# Patient Record
Sex: MALE | Race: WHITE | Employment: OTHER | URBAN - METROPOLITAN AREA
[De-identification: names, ages, dates, MRNs, and addresses within clinical notes are randomized per-mention and may not be internally consistent; named-entity substitution may affect disease eponyms.]

---

## 2021-12-17 ENCOUNTER — HOSPITAL ENCOUNTER (EMERGENCY)
Age: 71
Discharge: HOME | End: 2021-12-17
Attending: EMERGENCY MEDICINE
Payer: COMMERCIAL

## 2021-12-17 ENCOUNTER — APPOINTMENT (OUTPATIENT)
Dept: GENERAL RADIOLOGY | Age: 71
End: 2021-12-17
Payer: COMMERCIAL

## 2021-12-17 VITALS
BODY MASS INDEX: 25.76 KG/M2 | OXYGEN SATURATION: 99 % | WEIGHT: 170 LBS | HEART RATE: 46 BPM | DIASTOLIC BLOOD PRESSURE: 50 MMHG | TEMPERATURE: 98.6 F | SYSTOLIC BLOOD PRESSURE: 138 MMHG | HEIGHT: 68 IN | RESPIRATION RATE: 16 BRPM

## 2021-12-17 DIAGNOSIS — M25.452 HIP SWELLING, LEFT: Primary | ICD-10-CM

## 2021-12-17 LAB
ABSOLUTE EOS #: <0.03 K/UL (ref 0–0.44)
ABSOLUTE IMMATURE GRANULOCYTE: 0.13 K/UL (ref 0–0.3)
ABSOLUTE LYMPH #: 1.38 K/UL (ref 1.1–3.7)
ABSOLUTE MONO #: 0.73 K/UL (ref 0.1–1.2)
ALBUMIN SERPL-MCNC: 3.5 G/DL (ref 3.5–5.2)
ALBUMIN/GLOBULIN RATIO: ABNORMAL (ref 1–2.5)
ALP BLD-CCNC: 110 U/L (ref 40–129)
ALT SERPL-CCNC: 14 U/L (ref 5–41)
ANION GAP SERPL CALCULATED.3IONS-SCNC: 16 MMOL/L (ref 9–17)
AST SERPL-CCNC: 19 U/L
BASOPHILS # BLD: 0 % (ref 0–2)
BASOPHILS ABSOLUTE: 0.06 K/UL (ref 0–0.2)
BILIRUB SERPL-MCNC: 0.44 MG/DL (ref 0.3–1.2)
BUN BLDV-MCNC: 21 MG/DL (ref 8–23)
BUN/CREAT BLD: 11 (ref 9–20)
C-REACTIVE PROTEIN: 4 MG/L (ref 0–5)
CALCIUM SERPL-MCNC: 8.8 MG/DL (ref 8.6–10.4)
CHLORIDE BLD-SCNC: 103 MMOL/L (ref 98–107)
CO2: 18 MMOL/L (ref 20–31)
CREAT SERPL-MCNC: 1.87 MG/DL (ref 0.7–1.2)
DIFFERENTIAL TYPE: ABNORMAL
DIRECT EXAM: ABNORMAL
DIRECT EXAM: ABNORMAL
EOSINOPHILS RELATIVE PERCENT: 0 % (ref 1–4)
GFR AFRICAN AMERICAN: 43 ML/MIN
GFR NON-AFRICAN AMERICAN: 36 ML/MIN
GFR SERPL CREATININE-BSD FRML MDRD: ABNORMAL ML/MIN/{1.73_M2}
GFR SERPL CREATININE-BSD FRML MDRD: ABNORMAL ML/MIN/{1.73_M2}
GLUCOSE BLD-MCNC: 101 MG/DL (ref 70–99)
HCT VFR BLD CALC: 33 % (ref 40.7–50.3)
HEMOGLOBIN: 10.1 G/DL (ref 13–17)
IMMATURE GRANULOCYTES: 1 %
INR BLD: 1.1
LYMPHOCYTES # BLD: 10 % (ref 24–43)
Lab: ABNORMAL
MCH RBC QN AUTO: 27.9 PG (ref 25.2–33.5)
MCHC RBC AUTO-ENTMCNC: 30.6 G/DL (ref 28.4–34.8)
MCV RBC AUTO: 91.2 FL (ref 82.6–102.9)
MONOCYTES # BLD: 5 % (ref 3–12)
NRBC AUTOMATED: 0 PER 100 WBC
PARTIAL THROMBOPLASTIN TIME: 27.5 SEC (ref 23.9–33.8)
PDW BLD-RTO: 14.9 % (ref 11.8–14.4)
PLATELET # BLD: 279 K/UL (ref 138–453)
PLATELET ESTIMATE: ABNORMAL
PMV BLD AUTO: 10.7 FL (ref 8.1–13.5)
POTASSIUM SERPL-SCNC: 5.1 MMOL/L (ref 3.7–5.3)
PROTHROMBIN TIME: 14.1 SEC (ref 11.5–14.2)
RBC # BLD: 3.62 M/UL (ref 4.21–5.77)
RBC # BLD: ABNORMAL 10*6/UL
SEDIMENTATION RATE, ERYTHROCYTE: 16 MM (ref 0–20)
SEG NEUTROPHILS: 84 % (ref 36–65)
SEGMENTED NEUTROPHILS ABSOLUTE COUNT: 11.71 K/UL (ref 1.5–8.1)
SODIUM BLD-SCNC: 137 MMOL/L (ref 135–144)
SPECIMEN DESCRIPTION: ABNORMAL
TOTAL PROTEIN: 6.2 G/DL (ref 6.4–8.3)
WBC # BLD: 14 K/UL (ref 3.5–11.3)
WBC # BLD: ABNORMAL 10*3/UL

## 2021-12-17 PROCEDURE — 2580000003 HC RX 258: Performed by: PHYSICIAN ASSISTANT

## 2021-12-17 PROCEDURE — 85610 PROTHROMBIN TIME: CPT

## 2021-12-17 PROCEDURE — 80053 COMPREHEN METABOLIC PANEL: CPT

## 2021-12-17 PROCEDURE — 99283 EMERGENCY DEPT VISIT LOW MDM: CPT

## 2021-12-17 PROCEDURE — 85730 THROMBOPLASTIN TIME PARTIAL: CPT

## 2021-12-17 PROCEDURE — 85652 RBC SED RATE AUTOMATED: CPT

## 2021-12-17 PROCEDURE — 85025 COMPLETE CBC W/AUTO DIFF WBC: CPT

## 2021-12-17 PROCEDURE — 6370000000 HC RX 637 (ALT 250 FOR IP): Performed by: PHYSICIAN ASSISTANT

## 2021-12-17 PROCEDURE — 89051 BODY FLUID CELL COUNT: CPT

## 2021-12-17 PROCEDURE — 73502 X-RAY EXAM HIP UNI 2-3 VIEWS: CPT

## 2021-12-17 PROCEDURE — 96375 TX/PRO/DX INJ NEW DRUG ADDON: CPT

## 2021-12-17 PROCEDURE — 71045 X-RAY EXAM CHEST 1 VIEW: CPT

## 2021-12-17 PROCEDURE — 86140 C-REACTIVE PROTEIN: CPT

## 2021-12-17 PROCEDURE — 87205 SMEAR GRAM STAIN: CPT

## 2021-12-17 PROCEDURE — 96365 THER/PROPH/DIAG IV INF INIT: CPT

## 2021-12-17 PROCEDURE — 96366 THER/PROPH/DIAG IV INF ADDON: CPT

## 2021-12-17 PROCEDURE — 6360000002 HC RX W HCPCS: Performed by: PHYSICIAN ASSISTANT

## 2021-12-17 PROCEDURE — 87040 BLOOD CULTURE FOR BACTERIA: CPT

## 2021-12-17 PROCEDURE — 36415 COLL VENOUS BLD VENIPUNCTURE: CPT

## 2021-12-17 RX ORDER — ACETAMINOPHEN 500 MG
1000 TABLET ORAL ONCE
Status: COMPLETED | OUTPATIENT
Start: 2021-12-17 | End: 2021-12-17

## 2021-12-17 RX ORDER — ONDANSETRON 2 MG/ML
4 INJECTION INTRAMUSCULAR; INTRAVENOUS ONCE
Status: COMPLETED | OUTPATIENT
Start: 2021-12-17 | End: 2021-12-17

## 2021-12-17 RX ORDER — PYRIDOSTIGMINE BROMIDE 60 MG/1
60 TABLET ORAL ONCE
Status: COMPLETED | OUTPATIENT
Start: 2021-12-17 | End: 2021-12-17

## 2021-12-17 RX ORDER — SODIUM CHLORIDE 9 MG/ML
INJECTION, SOLUTION INTRAVENOUS CONTINUOUS
Status: DISCONTINUED | OUTPATIENT
Start: 2021-12-17 | End: 2021-12-18 | Stop reason: HOSPADM

## 2021-12-17 RX ADMIN — SODIUM CHLORIDE: 9 INJECTION, SOLUTION INTRAVENOUS at 19:14

## 2021-12-17 RX ADMIN — VANCOMYCIN HYDROCHLORIDE 2000 MG: 1 INJECTION, POWDER, LYOPHILIZED, FOR SOLUTION INTRAVENOUS at 20:31

## 2021-12-17 RX ADMIN — ACETAMINOPHEN 1000 MG: 500 TABLET ORAL at 19:12

## 2021-12-17 RX ADMIN — ONDANSETRON 4 MG: 2 INJECTION INTRAMUSCULAR; INTRAVENOUS at 19:13

## 2021-12-17 RX ADMIN — PYRIDOSTIGMINE BROMIDE 60 MG: 60 TABLET ORAL at 19:56

## 2021-12-17 ASSESSMENT — PAIN SCALES - GENERAL: PAINLEVEL_OUTOF10: 0

## 2021-12-18 NOTE — ED PROVIDER NOTES
[address] [place] [address] ED  [facility]      Pt Name: Laurita Shahid  MRN: [id number]  [place] 1950  Date ofevaluation: 12/17/2021  Provider: Shelby Weaver Dr       Chief Complaint   Patient presents with    Dizziness     nausea, appears yellow in color    Hip Pain     left hip and leg after physical therapy today, left hip replaced 10/25/21         HISTORY OF PRESENT ILLNESS  (Location/Symptom, Timing/Onset, Context/Setting, Quality, Duration, Modifying Factors, Severity.)   Laurita Shahid is a 80 y.o. male who presents to the emergency department with weakness fatigue and left hip pain. Patient had a hip replacement on October 25. Patient reports some swelling and pain this morning. Was seen by orthopedic doctor who ultimately directed patient to the ER. Dr. Tray Choi is his ortho doctor. He called to be notified once patient arrives late, evaluate the patient. He plans to perform arthrocentesis on the hip. Nursing Notes were reviewed. ALLERGIES     Pcn [penicillins] and Sulfa antibiotics    CURRENT MEDICATIONS       Previous Medications    AMLODIPINE (NORVASC) 5 MG TABLET    Take 1 tablet by mouth daily for 14 days. DIVALPROEX (DEPAKOTE) 500 MG DR TABLET    Take 1,000 mg by mouth 2 times daily. ERGOCALCIFEROL (DRISDOL) 25269 UNITS CAPSULE    Take 1 capsule by mouth once a week. LEVETIRACETAM (KEPPRA) 500 MG TABLET    Take 1 tablet by mouth 2 times daily    METFORMIN (GLUCOPHAGE) 500 MG TABLET    Take 500 mg by mouth daily (with breakfast). METHOTREXATE (RHEUMATREX) 2.5 MG CHEMO TABLET    Take 2.5 mg by mouth twice a week. NAPROXEN (NAPROSYN) 500 MG TABLET    Take 500 mg by mouth 2 times daily (with meals). PREDNISONE (DELTASONE) 20 MG TABLET    Take 3 tablets by mouth daily    PYRIDOSTIGMINE (MESTINON) 60 MG TABLET    Take 60 mg by mouth 5 times daily    THERAPEUTIC MULTIVITAMIN-MINERALS (THERAGRAN-M) TABLET    Take 1 tablet by mouth daily. PAST MEDICAL HISTORY         Diagnosis Date    Arthritis     Chronic kidney disease     Dysmetabolic syndrome     Hypertension     Iron deficiency anemia, unspecified     Movement disorder     Myasthenia gravis (HCC)     Proteinuria     Seizures ([facility] [address]        SURGICAL HISTORY           Procedure Laterality Date    KNEE SURGERY      QUADRACEPS TENDON REPAIR           FAMILY HISTORY     No family history on file. No family status information on file. SOCIAL HISTORY      reports that he has been smoking cigarettes. He has been smoking about 1.00 pack per day. He does not have any smokeless tobacco history on file. He reports that he does not drink alcohol and does not use drugs. REVIEW OFSYSTEMS    (2-9 systems for level 4, 10 or more for level 5)   Review of Systems    Except as noted above the remainder of the review of systems was reviewed and negative. PHYSICAL EXAM    (up to 7 for level 4, 8 or more for level 5)     ED Triage Vitals   BP Temp Temp Source Pulse Resp SpO2 Height Weight   12/17/21 1746 12/17/21 1746 12/17/21 1746 12/17/21 1746 12/17/21 1746 12/17/21 1746 12/17/21 1748 12/17/21 1748   (!) 138/50 98.6 °F (37 °C) Oral (!) 46 16 99 % 5' 8\" (1.727 m) 170 lb (77.1 kg)      Physical Exam  Constitutional:       Appearance: He is well-developed. HENT:      Head: Normocephalic and atraumatic. Cardiovascular:      Rate and Rhythm: Normal rate and regular rhythm. Pulmonary:      Effort: Pulmonary effort is normal.      Breath sounds: Normal breath sounds. Abdominal:      Palpations: Abdomen is soft. Musculoskeletal:         General: Normal range of motion. Cervical back: Normal range of motion and neck supple. Legs:    Skin:     General: Skin is warm. Neurological:      Mental Status: He is alert and oriented to person, place, and time.    Psychiatric:         Behavior: Behavior normal.                 DIAGNOSTIC RESULTS     EKG: All EKG's are interpreted by the Emergency Department Physician who either signs or Co-signs this chart in the absence of a cardiologist.        RADIOLOGY:   Non-plain film images such as CT, Ultrasound and MRI are read by the radiologist. Plain radiographic images arevisualized and preliminarily interpreted by the emergency physician with the below findings:        Interpretation per the Radiologist below, if available at thetime of this note:          ED BEDSIDE ULTRASOUND:   Performed by ED Physician - none    LABS:  Labs Reviewed   GRAM STAIN - Abnormal; Notable for the following components:       Result Value    Direct Exam MODERATE NEUTROPHILS (*)     All other components within normal limits   CBC WITH AUTO DIFFERENTIAL - Abnormal; Notable for the following components:    WBC 14.0 (*)     RBC 3.62 (*)     Hemoglobin 10.1 (*)     Hematocrit 33.0 (*)     RDW 14.9 (*)     Seg Neutrophils 84 (*)     Lymphocytes 10 (*)     Eosinophils % 0 (*)     Immature Granulocytes 1 (*)     Segs Absolute 11.71 (*)     All other components within normal limits   COMPREHENSIVE METABOLIC PANEL - Abnormal; Notable for the following components:    Glucose 101 (*)     CREATININE 1.87 (*)     CO2 18 (*)     Total Protein 6.2 (*)     GFR Non- 36 (*)     GFR  43 (*)     All other components within normal limits   CULTURE, BLOOD 1   CULTURE, BLOOD 1   SEDIMENTATION RATE   PROTIME-INR   APTT   C-REACTIVE PROTEIN   BODY FLUID CELL COUNT WITH DIFFERENTIAL       All other labs were within normal range or not returned as of this dictation. EMERGENCY DEPARTMENT COURSE and DIFFERENTIAL DIAGNOSIS/MDM:   Vitals:    Vitals:    12/17/21 1746 12/17/21 1748   BP: (!) 138/50    Pulse: (!) 46    Resp: 16    Temp: 98.6 °F (37 °C)    TempSrc: Oral    SpO2: 99%    Weight:  170 lb (77.1 kg)   Height:  5' 8\" (1.727 m)     Patient seen by orthopedic doctor in the emergency department. He feels indecisive patient go home. Patient wishes to go home. Denies any fevers. Daughter reports history of leukocytosis in the past. States that that and renal insufficiency is not new. Patient has doxycycline running from the pharmacy. Vancomycin was given here in ER. Will discharge home. They are requesting to be discharged home. Non septic and non toxic appearing. CONSULTS:  IP CONSULT TO ORTHOPEDIC SURGERY  PHARMACY TO DOSE VANCOMYCIN    PROCEDURES:  Procedures        FINAL IMPRESSION      1. Hip swelling, left          DISPOSITION/PLAN   DISPOSITION Discharge - Pending Orders Complete 12/17/2021 10:00:08 PM      PATIENTREFERRED TO:   No follow-up provider specified.     DISCHARGE MEDICATIONS:     New Prescriptions    No medications on file           (Please note that portions of this note were completed with a voice recognition program.  Efforts were made to edit thedictations but occasionally words are mis-transcribed.)    MARIO ALBERTO Nguyen PA-C  12/17/21 3912

## 2021-12-18 NOTE — ED PROVIDER NOTES
eMERGENCY dEPARTMENT eNCOUnter   Independent Attestation     Pt Name: Benjy Hunter  MRN: [id number]  [place] 1950  Date of evaluation: 12/17/21     Benjy Hunter is a 80 y.o. male with CC: Dizziness (nausea, appears yellow in color) and Hip Pain (left hip and leg after physical therapy today, left hip replaced 10/25/21)      Based on the medical record the care appears appropriate. I was personally available for consultation in the Emergency Department. The care is provided during an unprecedented national emergency due to the novel coronavirus, COVID 19.     Britton Sauceda DO  Attending Emergency Physician                    Emily Webb DO  12/17/21 2205
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
## 2021-12-18 NOTE — CONSULTS
Pharmacy dosing of initial vancomycin ordered by ED provider. 2000 mg ordered x 1. Pharmacy is waiting to see if vancomycin therapy is continued or stopped/changed by the admitting physician.

## 2021-12-21 LAB
APPEARANCE FLUID: NORMAL
BASO FLUID: NORMAL %
COLOR FLUID: NORMAL
EOSINOPHIL FLUID: NORMAL %
FLUID DIFF COMMENT: NORMAL
LYMPHOCYTES, BODY FLUID: 15 %
MONOCYTE, FLUID: NORMAL %
NEUTROPHIL, FLUID: 78 %
OTHER CELLS FLUID: NORMAL %
RBC FLUID: NORMAL /MM3
SPECIMEN TYPE: NORMAL
WBC FLUID: 3279 /MM3

## 2021-12-22 LAB
CULTURE: NORMAL
CULTURE: NORMAL
Lab: NORMAL
Lab: NORMAL
SPECIMEN DESCRIPTION: NORMAL
SPECIMEN DESCRIPTION: NORMAL

## 2022-05-23 ENCOUNTER — APPOINTMENT (OUTPATIENT)
Dept: CT IMAGING | Age: 72
DRG: 563 | End: 2022-05-23
Payer: COMMERCIAL

## 2022-05-23 ENCOUNTER — APPOINTMENT (OUTPATIENT)
Dept: GENERAL RADIOLOGY | Age: 72
DRG: 563 | End: 2022-05-23
Payer: COMMERCIAL

## 2022-05-23 ENCOUNTER — HOSPITAL ENCOUNTER (INPATIENT)
Age: 72
LOS: 1 days | Discharge: HOME | DRG: 563 | End: 2022-05-24
Attending: EMERGENCY MEDICINE | Admitting: SURGERY
Payer: COMMERCIAL
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
## 2022-05-23 DIAGNOSIS — S42.201A CLOSED FRACTURE OF PROXIMAL END OF RIGHT HUMERUS, UNSPECIFIED FRACTURE MORPHOLOGY, INITIAL ENCOUNTER: Primary | ICD-10-CM

## 2022-05-23 LAB
ALLEN TEST: ABNORMAL
ANION GAP SERPL CALCULATED.3IONS-SCNC: 15 MMOL/L (ref 9–17)
BLOOD BANK SPECIMEN: ABNORMAL
BUN BLDV-MCNC: 20 MG/DL (ref 8–23)
CARBOXYHEMOGLOBIN: ABNORMAL %
CHLORIDE BLD-SCNC: 106 MMOL/L (ref 98–107)
CO2: 19 MMOL/L (ref 20–31)
CREAT SERPL-MCNC: 1.5 MG/DL (ref 0.7–1.2)
ETHANOL PERCENT: <0.01 %
ETHANOL: <10 MG/DL
FIO2: ABNORMAL
GFR AFRICAN AMERICAN: 56 ML/MIN
GFR NON-AFRICAN AMERICAN: 46 ML/MIN
GFR SERPL CREATININE-BSD FRML MDRD: ABNORMAL ML/MIN/{1.73_M2}
GLUCOSE BLD-MCNC: 111 MG/DL (ref 70–99)
HCG QUALITATIVE: ABNORMAL
HCO3 VENOUS: ABNORMAL MMOL/L (ref 24–30)
HCT VFR BLD CALC: 39.5 % (ref 40.7–50.3)
HEMOGLOBIN: 12.4 G/DL (ref 13–17)
INR BLD: 1
MCH RBC QN AUTO: 27 PG (ref 25.2–33.5)
MCHC RBC AUTO-ENTMCNC: 31.4 G/DL (ref 28.4–34.8)
MCV RBC AUTO: 85.9 FL (ref 82.6–102.9)
METHEMOGLOBIN: ABNORMAL %
MODE: ABNORMAL
NEGATIVE BASE EXCESS, VEN: ABNORMAL MMOL/L (ref 0–2)
NOTIFICATION TIME: ABNORMAL
NOTIFICATION: ABNORMAL
NRBC AUTOMATED: 0 PER 100 WBC
O2 DEVICE/FLOW/%: ABNORMAL
O2 SAT, VEN: ABNORMAL %
OXYHEMOGLOBIN: ABNORMAL % (ref 95–98)
PARTIAL THROMBOPLASTIN TIME: 24.3 SEC (ref 20.5–30.5)
PATIENT TEMP: ABNORMAL
PCO2, VEN, TEMP ADJ: ABNORMAL MMHG (ref 39–55)
PCO2, VEN: ABNORMAL (ref 39–55)
PDW BLD-RTO: 17.1 % (ref 11.8–14.4)
PEEP/CPAP: ABNORMAL
PH VENOUS: ABNORMAL (ref 7.32–7.42)
PH, VEN, TEMP ADJ: ABNORMAL (ref 7.32–7.42)
PLATELET # BLD: 241 K/UL (ref 138–453)
PMV BLD AUTO: 11.7 FL (ref 8.1–13.5)
PO2, VEN, TEMP ADJ: ABNORMAL MMHG (ref 30–50)
PO2, VEN: ABNORMAL (ref 30–50)
POSITIVE BASE EXCESS, VEN: ABNORMAL MMOL/L (ref 0–2)
POTASSIUM SERPL-SCNC: 3.8 MMOL/L (ref 3.7–5.3)
PROTHROMBIN TIME: 10.6 SEC (ref 9.1–12.3)
PSV: ABNORMAL
PT. POSITION: ABNORMAL
RBC # BLD: 4.6 M/UL (ref 4.21–5.77)
RESPIRATORY RATE: ABNORMAL
SAMPLE SITE: ABNORMAL
SET RATE: ABNORMAL
SODIUM BLD-SCNC: 140 MMOL/L (ref 135–144)
TEXT FOR RESPIRATORY: ABNORMAL
TOTAL HB: ABNORMAL G/DL (ref 12–16)
TOTAL RATE: ABNORMAL
VITAMIN D 25-HYDROXY: 28.6 NG/ML
VT: ABNORMAL
WBC # BLD: 7.5 K/UL (ref 3.5–11.3)

## 2022-05-23 PROCEDURE — 82805 BLOOD GASES W/O2 SATURATION: CPT

## 2022-05-23 PROCEDURE — 90715 TDAP VACCINE 7 YRS/> IM: CPT | Performed by: STUDENT IN AN ORGANIZED HEALTH CARE EDUCATION/TRAINING PROGRAM

## 2022-05-23 PROCEDURE — 6360000002 HC RX W HCPCS: Performed by: STUDENT IN AN ORGANIZED HEALTH CARE EDUCATION/TRAINING PROGRAM

## 2022-05-23 PROCEDURE — 82565 ASSAY OF CREATININE: CPT

## 2022-05-23 PROCEDURE — 80051 ELECTROLYTE PANEL: CPT

## 2022-05-23 PROCEDURE — 73502 X-RAY EXAM HIP UNI 2-3 VIEWS: CPT

## 2022-05-23 PROCEDURE — 84703 CHORIONIC GONADOTROPIN ASSAY: CPT

## 2022-05-23 PROCEDURE — 82306 VITAMIN D 25 HYDROXY: CPT

## 2022-05-23 PROCEDURE — 73030 X-RAY EXAM OF SHOULDER: CPT

## 2022-05-23 PROCEDURE — 71250 CT THORAX DX C-: CPT

## 2022-05-23 PROCEDURE — 93005 ELECTROCARDIOGRAM TRACING: CPT | Performed by: STUDENT IN AN ORGANIZED HEALTH CARE EDUCATION/TRAINING PROGRAM

## 2022-05-23 PROCEDURE — 73020 X-RAY EXAM OF SHOULDER: CPT

## 2022-05-23 PROCEDURE — 6370000000 HC RX 637 (ALT 250 FOR IP): Performed by: EMERGENCY MEDICINE

## 2022-05-23 PROCEDURE — 72125 CT NECK SPINE W/O DYE: CPT

## 2022-05-23 PROCEDURE — 73060 X-RAY EXAM OF HUMERUS: CPT

## 2022-05-23 PROCEDURE — 90471 IMMUNIZATION ADMIN: CPT | Performed by: STUDENT IN AN ORGANIZED HEALTH CARE EDUCATION/TRAINING PROGRAM

## 2022-05-23 PROCEDURE — 84520 ASSAY OF UREA NITROGEN: CPT

## 2022-05-23 PROCEDURE — 85610 PROTHROMBIN TIME: CPT

## 2022-05-23 PROCEDURE — 82947 ASSAY GLUCOSE BLOOD QUANT: CPT

## 2022-05-23 PROCEDURE — 6370000000 HC RX 637 (ALT 250 FOR IP): Performed by: STUDENT IN AN ORGANIZED HEALTH CARE EDUCATION/TRAINING PROGRAM

## 2022-05-23 PROCEDURE — 99285 EMERGENCY DEPT VISIT HI MDM: CPT

## 2022-05-23 PROCEDURE — 85027 COMPLETE CBC AUTOMATED: CPT

## 2022-05-23 PROCEDURE — 1200000000 HC SEMI PRIVATE

## 2022-05-23 PROCEDURE — 85730 THROMBOPLASTIN TIME PARTIAL: CPT

## 2022-05-23 PROCEDURE — G0480 DRUG TEST DEF 1-7 CLASSES: HCPCS

## 2022-05-23 PROCEDURE — 70450 CT HEAD/BRAIN W/O DYE: CPT

## 2022-05-23 PROCEDURE — 96374 THER/PROPH/DIAG INJ IV PUSH: CPT

## 2022-05-23 PROCEDURE — 3209999900 CT LUMBAR SPINE TRAUMA RECONSTRUCTION

## 2022-05-23 PROCEDURE — 3209999900 CT THORACIC SPINE TRAUMA RECONSTRUCTION

## 2022-05-23 PROCEDURE — 73200 CT UPPER EXTREMITY W/O DYE: CPT

## 2022-05-23 PROCEDURE — 73080 X-RAY EXAM OF ELBOW: CPT

## 2022-05-23 PROCEDURE — 96376 TX/PRO/DX INJ SAME DRUG ADON: CPT

## 2022-05-23 PROCEDURE — 96375 TX/PRO/DX INJ NEW DRUG ADDON: CPT

## 2022-05-23 PROCEDURE — 96372 THER/PROPH/DIAG INJ SC/IM: CPT

## 2022-05-23 RX ORDER — ASPIRIN 81 MG/1
81 TABLET, CHEWABLE ORAL DAILY
COMMUNITY

## 2022-05-23 RX ORDER — FENTANYL CITRATE 50 UG/ML
50 INJECTION, SOLUTION INTRAMUSCULAR; INTRAVENOUS ONCE
Status: COMPLETED | OUTPATIENT
Start: 2022-05-23 | End: 2022-05-23

## 2022-05-23 RX ORDER — FENTANYL CITRATE 50 UG/ML
100 INJECTION, SOLUTION INTRAMUSCULAR; INTRAVENOUS ONCE
Status: COMPLETED | OUTPATIENT
Start: 2022-05-23 | End: 2022-05-23

## 2022-05-23 RX ORDER — OXYCODONE HYDROCHLORIDE 5 MG/1
5 TABLET ORAL EVERY 4 HOURS PRN
Status: DISCONTINUED | OUTPATIENT
Start: 2022-05-23 | End: 2022-05-24

## 2022-05-23 RX ORDER — PANTOPRAZOLE SODIUM 40 MG/1
40 TABLET, DELAYED RELEASE ORAL DAILY
COMMUNITY

## 2022-05-23 RX ORDER — ACETAMINOPHEN 500 MG
1000 TABLET ORAL EVERY 8 HOURS SCHEDULED
Status: DISCONTINUED | OUTPATIENT
Start: 2022-05-23 | End: 2022-05-24 | Stop reason: HOSPADM

## 2022-05-23 RX ORDER — PYRIDOSTIGMINE BROMIDE 60 MG/1
60 TABLET ORAL ONCE
Status: COMPLETED | OUTPATIENT
Start: 2022-05-23 | End: 2022-05-23

## 2022-05-23 RX ORDER — ONDANSETRON 2 MG/ML
4 INJECTION INTRAMUSCULAR; INTRAVENOUS ONCE
Status: COMPLETED | OUTPATIENT
Start: 2022-05-23 | End: 2022-05-23

## 2022-05-23 RX ORDER — METOPROLOL SUCCINATE 50 MG/1
50 TABLET, EXTENDED RELEASE ORAL DAILY
COMMUNITY

## 2022-05-23 RX ADMIN — FENTANYL CITRATE 25 MCG: 50 INJECTION, SOLUTION INTRAMUSCULAR; INTRAVENOUS at 16:34

## 2022-05-23 RX ADMIN — FENTANYL CITRATE 50 MCG: 50 INJECTION, SOLUTION INTRAMUSCULAR; INTRAVENOUS at 17:06

## 2022-05-23 RX ADMIN — ACETAMINOPHEN 1000 MG: 500 TABLET ORAL at 22:46

## 2022-05-23 RX ADMIN — FENTANYL CITRATE 50 MCG: 50 INJECTION, SOLUTION INTRAMUSCULAR; INTRAVENOUS at 20:13

## 2022-05-23 RX ADMIN — OXYCODONE 5 MG: 5 TABLET ORAL at 22:46

## 2022-05-23 RX ADMIN — FENTANYL CITRATE 100 MCG: 50 INJECTION, SOLUTION INTRAMUSCULAR; INTRAVENOUS at 18:24

## 2022-05-23 RX ADMIN — PYRIDOSTIGMINE BROMIDE 60 MG: 60 TABLET ORAL at 19:54

## 2022-05-23 RX ADMIN — ONDANSETRON 4 MG: 2 INJECTION INTRAMUSCULAR; INTRAVENOUS at 23:20

## 2022-05-23 RX ADMIN — TETANUS TOXOID, REDUCED DIPHTHERIA TOXOID AND ACELLULAR PERTUSSIS VACCINE, ADSORBED 0.5 ML: 5; 2.5; 8; 8; 2.5 SUSPENSION INTRAMUSCULAR at 23:20

## 2022-05-23 ASSESSMENT — PAIN DESCRIPTION - DESCRIPTORS
DESCRIPTORS: ACHING
DESCRIPTORS: SHARP

## 2022-05-23 ASSESSMENT — ENCOUNTER SYMPTOMS
BACK PAIN: 0
SHORTNESS OF BREATH: 0
FACIAL SWELLING: 1
VOMITING: 0
TROUBLE SWALLOWING: 0
RHINORRHEA: 0
NAUSEA: 0
COUGH: 0
DIARRHEA: 0
ABDOMINAL PAIN: 0
EYE REDNESS: 0
EYE PAIN: 0

## 2022-05-23 ASSESSMENT — PAIN SCALES - GENERAL: PAINLEVEL_OUTOF10: 10

## 2022-05-23 ASSESSMENT — PAIN DESCRIPTION - ORIENTATION
ORIENTATION: RIGHT
ORIENTATION: RIGHT

## 2022-05-23 ASSESSMENT — PAIN DESCRIPTION - LOCATION
LOCATION: ARM
LOCATION: SHOULDER

## 2022-05-23 NOTE — ED NOTES
SW met with patient at bedside. Daughter states that patient just got some pain pills so he is now a little out of it. SW presented [facility] information to daughter in case patient would want services from them after discharge. Daughter stated understanding.         Eduin Prather, CINDY Intern     Sofie Mosquera, CINDY  05/23/22 1833       CINDY Harman  05/23/22 2024
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
## 2022-05-24 ENCOUNTER — APPOINTMENT (OUTPATIENT)
Dept: GENERAL RADIOLOGY | Age: 72
DRG: 563 | End: 2022-05-24
Payer: COMMERCIAL

## 2022-05-24 VITALS
DIASTOLIC BLOOD PRESSURE: 76 MMHG | TEMPERATURE: 98.5 F | OXYGEN SATURATION: 98 % | BODY MASS INDEX: 26.12 KG/M2 | HEART RATE: 83 BPM | RESPIRATION RATE: 17 BRPM | SYSTOLIC BLOOD PRESSURE: 111 MMHG | WEIGHT: 176.37 LBS | HEIGHT: 69 IN

## 2022-05-24 LAB
ABSOLUTE EOS #: <0.03 K/UL (ref 0–0.44)
ABSOLUTE IMMATURE GRANULOCYTE: 0.06 K/UL (ref 0–0.3)
ABSOLUTE LYMPH #: 0.97 K/UL (ref 1.1–3.7)
ABSOLUTE MONO #: 1.12 K/UL (ref 0.1–1.2)
ANION GAP SERPL CALCULATED.3IONS-SCNC: 12 MMOL/L (ref 9–17)
BASOPHILS # BLD: 0 % (ref 0–2)
BASOPHILS ABSOLUTE: 0.03 K/UL (ref 0–0.2)
BUN BLDV-MCNC: 24 MG/DL (ref 8–23)
CALCIUM SERPL-MCNC: 8.7 MG/DL (ref 8.6–10.4)
CHLORIDE BLD-SCNC: 107 MMOL/L (ref 98–107)
CO2: 20 MMOL/L (ref 20–31)
CREAT SERPL-MCNC: 1.79 MG/DL (ref 0.7–1.2)
EKG ATRIAL RATE: 112 BPM
EKG P AXIS: 32 DEGREES
EKG P-R INTERVAL: 160 MS
EKG Q-T INTERVAL: 376 MS
EKG QRS DURATION: 120 MS
EKG QTC CALCULATION (BAZETT): 513 MS
EKG R AXIS: -20 DEGREES
EKG T AXIS: 40 DEGREES
EKG VENTRICULAR RATE: 112 BPM
EOSINOPHILS RELATIVE PERCENT: 0 % (ref 1–4)
GFR AFRICAN AMERICAN: 46 ML/MIN
GFR NON-AFRICAN AMERICAN: 38 ML/MIN
GFR SERPL CREATININE-BSD FRML MDRD: ABNORMAL ML/MIN/{1.73_M2}
GLUCOSE BLD-MCNC: 130 MG/DL (ref 70–99)
HCT VFR BLD CALC: 32.2 % (ref 40.7–50.3)
HEMOGLOBIN: 10.1 G/DL (ref 13–17)
IMMATURE GRANULOCYTES: 1 %
LYMPHOCYTES # BLD: 9 % (ref 24–43)
MCH RBC QN AUTO: 26.5 PG (ref 25.2–33.5)
MCHC RBC AUTO-ENTMCNC: 31.4 G/DL (ref 28.4–34.8)
MCV RBC AUTO: 84.5 FL (ref 82.6–102.9)
MONOCYTES # BLD: 11 % (ref 3–12)
NRBC AUTOMATED: 0 PER 100 WBC
PDW BLD-RTO: 17.2 % (ref 11.8–14.4)
PLATELET # BLD: 225 K/UL (ref 138–453)
PMV BLD AUTO: 11.1 FL (ref 8.1–13.5)
POTASSIUM SERPL-SCNC: 4.3 MMOL/L (ref 3.7–5.3)
RBC # BLD: 3.81 M/UL (ref 4.21–5.77)
RBC # BLD: ABNORMAL 10*6/UL
SEG NEUTROPHILS: 79 % (ref 36–65)
SEGMENTED NEUTROPHILS ABSOLUTE COUNT: 8.24 K/UL (ref 1.5–8.1)
SODIUM BLD-SCNC: 139 MMOL/L (ref 135–144)
WBC # BLD: 10.4 K/UL (ref 3.5–11.3)

## 2022-05-24 PROCEDURE — 80048 BASIC METABOLIC PNL TOTAL CA: CPT

## 2022-05-24 PROCEDURE — 99221 1ST HOSP IP/OBS SF/LOW 40: CPT | Performed by: PSYCHIATRY & NEUROLOGY

## 2022-05-24 PROCEDURE — 97530 THERAPEUTIC ACTIVITIES: CPT

## 2022-05-24 PROCEDURE — 6370000000 HC RX 637 (ALT 250 FOR IP): Performed by: STUDENT IN AN ORGANIZED HEALTH CARE EDUCATION/TRAINING PROGRAM

## 2022-05-24 PROCEDURE — 51798 US URINE CAPACITY MEASURE: CPT

## 2022-05-24 PROCEDURE — 97162 PT EVAL MOD COMPLEX 30 MIN: CPT

## 2022-05-24 PROCEDURE — 23600 CLTX PROX HUMRL FX W/O MNPJ: CPT | Performed by: ORTHOPAEDIC SURGERY

## 2022-05-24 PROCEDURE — 73502 X-RAY EXAM HIP UNI 2-3 VIEWS: CPT

## 2022-05-24 PROCEDURE — 36415 COLL VENOUS BLD VENIPUNCTURE: CPT

## 2022-05-24 PROCEDURE — 2580000003 HC RX 258: Performed by: STUDENT IN AN ORGANIZED HEALTH CARE EDUCATION/TRAINING PROGRAM

## 2022-05-24 PROCEDURE — 97166 OT EVAL MOD COMPLEX 45 MIN: CPT

## 2022-05-24 PROCEDURE — 97110 THERAPEUTIC EXERCISES: CPT

## 2022-05-24 PROCEDURE — 85025 COMPLETE CBC W/AUTO DIFF WBC: CPT

## 2022-05-24 PROCEDURE — G0378 HOSPITAL OBSERVATION PER HR: HCPCS

## 2022-05-24 PROCEDURE — 97116 GAIT TRAINING THERAPY: CPT

## 2022-05-24 RX ORDER — HEPARIN SODIUM 5000 [USP'U]/ML
5000 INJECTION, SOLUTION INTRAVENOUS; SUBCUTANEOUS EVERY 8 HOURS SCHEDULED
Status: DISCONTINUED | OUTPATIENT
Start: 2022-05-24 | End: 2022-05-24 | Stop reason: HOSPADM

## 2022-05-24 RX ORDER — ONDANSETRON 2 MG/ML
4 INJECTION INTRAMUSCULAR; INTRAVENOUS EVERY 6 HOURS PRN
Status: DISCONTINUED | OUTPATIENT
Start: 2022-05-24 | End: 2022-05-24

## 2022-05-24 RX ORDER — ACETAMINOPHEN 500 MG
1000 TABLET ORAL EVERY 8 HOURS SCHEDULED
Status: DISCONTINUED | OUTPATIENT
Start: 2022-05-24 | End: 2022-05-24

## 2022-05-24 RX ORDER — DIVALPROEX SODIUM 125 MG/1
125 TABLET, DELAYED RELEASE ORAL 2 TIMES DAILY
Status: DISCONTINUED | OUTPATIENT
Start: 2022-05-24 | End: 2022-05-24 | Stop reason: HOSPADM

## 2022-05-24 RX ORDER — ACETAMINOPHEN 500 MG
1000 TABLET ORAL EVERY 8 HOURS SCHEDULED
Qty: 120 TABLET | Refills: 3 | Status: SHIPPED | OUTPATIENT
Start: 2022-05-24

## 2022-05-24 RX ORDER — SODIUM CHLORIDE 9 MG/ML
INJECTION, SOLUTION INTRAVENOUS PRN
Status: DISCONTINUED | OUTPATIENT
Start: 2022-05-24 | End: 2022-05-24

## 2022-05-24 RX ORDER — ERGOCALCIFEROL 1.25 MG/1
50000 CAPSULE ORAL WEEKLY
Qty: 8 CAPSULE | Refills: 0 | Status: SHIPPED | OUTPATIENT
Start: 2022-05-24 | End: 2022-07-13

## 2022-05-24 RX ORDER — OXYCODONE HYDROCHLORIDE 5 MG/1
2.5 TABLET ORAL EVERY 8 HOURS PRN
Status: DISCONTINUED | OUTPATIENT
Start: 2022-05-24 | End: 2022-05-24 | Stop reason: HOSPADM

## 2022-05-24 RX ORDER — SODIUM CHLORIDE 0.9 % (FLUSH) 0.9 %
5-40 SYRINGE (ML) INJECTION EVERY 12 HOURS SCHEDULED
Status: DISCONTINUED | OUTPATIENT
Start: 2022-05-24 | End: 2022-05-24

## 2022-05-24 RX ORDER — POLYETHYLENE GLYCOL 3350 17 G/17G
17 POWDER, FOR SOLUTION ORAL DAILY
Qty: 527 G | Refills: 1 | Status: SHIPPED | OUTPATIENT
Start: 2022-05-24 | End: 2022-06-23

## 2022-05-24 RX ORDER — ONDANSETRON 4 MG/1
4 TABLET, ORALLY DISINTEGRATING ORAL EVERY 8 HOURS PRN
Status: DISCONTINUED | OUTPATIENT
Start: 2022-05-24 | End: 2022-05-24

## 2022-05-24 RX ORDER — METHOCARBAMOL 750 MG/1
750 TABLET, FILM COATED ORAL EVERY 6 HOURS
Qty: 40 TABLET | Refills: 0 | Status: SHIPPED | OUTPATIENT
Start: 2022-05-24 | End: 2022-06-03

## 2022-05-24 RX ORDER — OXYCODONE HYDROCHLORIDE 5 MG/1
5 TABLET ORAL EVERY 6 HOURS PRN
Status: DISCONTINUED | OUTPATIENT
Start: 2022-05-24 | End: 2022-05-24

## 2022-05-24 RX ORDER — ERGOCALCIFEROL 1.25 MG/1
50000 CAPSULE ORAL WEEKLY
Status: DISCONTINUED | OUTPATIENT
Start: 2022-05-24 | End: 2022-05-24 | Stop reason: HOSPADM

## 2022-05-24 RX ORDER — METHOCARBAMOL 750 MG/1
750 TABLET, FILM COATED ORAL EVERY 6 HOURS
Status: DISCONTINUED | OUTPATIENT
Start: 2022-05-24 | End: 2022-05-24 | Stop reason: HOSPADM

## 2022-05-24 RX ORDER — OXYCODONE HYDROCHLORIDE 5 MG/1
2.5 TABLET ORAL EVERY 12 HOURS PRN
Qty: 5 TABLET | Refills: 0 | Status: CANCELLED | OUTPATIENT
Start: 2022-05-24 | End: 2022-05-29

## 2022-05-24 RX ORDER — PANTOPRAZOLE SODIUM 40 MG/1
40 TABLET, DELAYED RELEASE ORAL DAILY
Status: DISCONTINUED | OUTPATIENT
Start: 2022-05-24 | End: 2022-05-24 | Stop reason: HOSPADM

## 2022-05-24 RX ORDER — METOPROLOL SUCCINATE 50 MG/1
50 TABLET, EXTENDED RELEASE ORAL DAILY
Status: DISCONTINUED | OUTPATIENT
Start: 2022-05-24 | End: 2022-05-24 | Stop reason: HOSPADM

## 2022-05-24 RX ORDER — ASPIRIN 81 MG/1
81 TABLET, CHEWABLE ORAL DAILY
Status: DISCONTINUED | OUTPATIENT
Start: 2022-05-24 | End: 2022-05-24 | Stop reason: HOSPADM

## 2022-05-24 RX ORDER — M-VIT,TX,IRON,MINS/CALC/FOLIC 27MG-0.4MG
1 TABLET ORAL DAILY
Status: DISCONTINUED | OUTPATIENT
Start: 2022-05-24 | End: 2022-05-24 | Stop reason: HOSPADM

## 2022-05-24 RX ORDER — METHOCARBAMOL 750 MG/1
750 TABLET, FILM COATED ORAL 4 TIMES DAILY
Qty: 40 TABLET | Refills: 0 | Status: CANCELLED | OUTPATIENT
Start: 2022-05-24 | End: 2022-06-03

## 2022-05-24 RX ORDER — OXYCODONE HYDROCHLORIDE 5 MG/1
2.5 TABLET ORAL EVERY 8 HOURS PRN
Qty: 5 TABLET | Refills: 0 | Status: SHIPPED | OUTPATIENT
Start: 2022-05-24 | End: 2022-05-29

## 2022-05-24 RX ORDER — SODIUM CHLORIDE 0.9 % (FLUSH) 0.9 %
5-40 SYRINGE (ML) INJECTION PRN
Status: DISCONTINUED | OUTPATIENT
Start: 2022-05-24 | End: 2022-05-24 | Stop reason: HOSPADM

## 2022-05-24 RX ORDER — PYRIDOSTIGMINE BROMIDE 60 MG/1
60 TABLET ORAL
Status: DISCONTINUED | OUTPATIENT
Start: 2022-05-24 | End: 2022-05-24 | Stop reason: HOSPADM

## 2022-05-24 RX ORDER — POLYETHYLENE GLYCOL 3350 17 G/17G
17 POWDER, FOR SOLUTION ORAL DAILY
Status: DISCONTINUED | OUTPATIENT
Start: 2022-05-24 | End: 2022-05-24 | Stop reason: HOSPADM

## 2022-05-24 RX ORDER — LEVETIRACETAM 500 MG/1
500 TABLET ORAL 2 TIMES DAILY
Status: DISCONTINUED | OUTPATIENT
Start: 2022-05-24 | End: 2022-05-24 | Stop reason: HOSPADM

## 2022-05-24 RX ADMIN — PYRIDOSTIGMINE BROMIDE 60 MG: 60 TABLET ORAL at 09:03

## 2022-05-24 RX ADMIN — ACETAMINOPHEN 1000 MG: 500 TABLET ORAL at 14:07

## 2022-05-24 RX ADMIN — PYRIDOSTIGMINE BROMIDE 60 MG: 60 TABLET ORAL at 14:07

## 2022-05-24 RX ADMIN — Medication 1 TABLET: at 09:03

## 2022-05-24 RX ADMIN — METOPROLOL SUCCINATE 50 MG: 50 TABLET, FILM COATED, EXTENDED RELEASE ORAL at 09:03

## 2022-05-24 RX ADMIN — ACETAMINOPHEN 1000 MG: 500 TABLET ORAL at 06:26

## 2022-05-24 RX ADMIN — PANTOPRAZOLE SODIUM 40 MG: 40 TABLET, DELAYED RELEASE ORAL at 09:03

## 2022-05-24 RX ADMIN — ASPIRIN 81 MG: 81 TABLET, CHEWABLE ORAL at 09:03

## 2022-05-24 RX ADMIN — OXYCODONE 5 MG: 5 TABLET ORAL at 02:52

## 2022-05-24 RX ADMIN — DIVALPROEX SODIUM 125 MG: 125 TABLET, DELAYED RELEASE ORAL at 09:03

## 2022-05-24 RX ADMIN — SODIUM CHLORIDE, PRESERVATIVE FREE 10 ML: 5 INJECTION INTRAVENOUS at 09:03

## 2022-05-24 RX ADMIN — LEVETIRACETAM 500 MG: 500 TABLET, FILM COATED ORAL at 09:03

## 2022-05-24 RX ADMIN — ERGOCALCIFEROL 50000 UNITS: 1.25 CAPSULE ORAL at 09:03

## 2022-05-24 ASSESSMENT — PAIN SCALES - GENERAL
PAINLEVEL_OUTOF10: 10
PAINLEVEL_OUTOF10: 7
PAINLEVEL_OUTOF10: 7
PAINLEVEL_OUTOF10: 8

## 2022-05-24 ASSESSMENT — PAIN DESCRIPTION - DESCRIPTORS: DESCRIPTORS: ACHING

## 2022-05-24 ASSESSMENT — PAIN DESCRIPTION - LOCATION: LOCATION: ARM

## 2022-05-24 ASSESSMENT — PAIN DESCRIPTION - PAIN TYPE: TYPE: ACUTE PAIN

## 2022-05-24 ASSESSMENT — PAIN DESCRIPTION - ONSET: ONSET: ON-GOING

## 2022-05-24 ASSESSMENT — PAIN DESCRIPTION - ORIENTATION: ORIENTATION: RIGHT

## 2022-05-24 ASSESSMENT — PAIN - FUNCTIONAL ASSESSMENT: PAIN_FUNCTIONAL_ASSESSMENT: PREVENTS OR INTERFERES SOME ACTIVE ACTIVITIES AND ADLS

## 2022-05-24 ASSESSMENT — PAIN DESCRIPTION - FREQUENCY: FREQUENCY: CONTINUOUS
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
## 2022-05-24 NOTE — PROGRESS NOTES
CLINICAL PHARMACY NOTE: MEDS TO BEDS    Total # of Prescriptions Filled: 5   The following medications were delivered to the patient:  · Methocarbamol 750mg  · Oxycodone 5mg  · Acetaminophen 500mg  · Vitamin D [id number]  · Glycolax    Additional Documentation: delivered to patient in room 2004 5/24 at 5:21pm. Co-pay paid with credit on [employer] ($58.94).

## 2022-05-24 NOTE — PROGRESS NOTES
Orthopedic Progress Note    Patient:  Galina Horta  YOB: 1950     80 y.o. male    Subjective:  Patient seen and examined  No complaints or concerns  No issue overnight  Pain controlled  Denies fever, HA, CP, SOB, N/V, dysuria    Vitals reviewed    Objective:   Vitals:    05/24/22 0413   BP:    Pulse: 86   Resp:    Temp:    SpO2:        Gen: NAD, cooperative     Cardiovascular: Regular rate    Respiratory: Chest symmetric, no accessory muscle use, normal respirations, no audible wheezes    MSK: RUE: Sling on. Abrasions to the olecranon and posterior forearm. Significant swelling to the anterior shoulder. Significant tenderness to palpation globally about the shoulder. compartments soft and easily compressible. Unable to tolerate active range of motion of the shoulder due to pain. Tolerates minimal passive range of motion at the shoulder due to pain. Full passive ROM at elbow without pain in the elbow. Most pain localized to shoulder with any ROM in RUE. Ulnar/median/AIN/PIN/radial motor intact. Axillary/MCN/median/ulnar/radial nerves SILT. Radial pulse 2+ with BCR. Recent Labs     05/23/22  1646   WBC 7.5   HGB 12.4*   HCT 39.5*      INR 1.0      K 3.8   BUN 20   CREATININE 1.50*   GLUCOSE 111*        DVT ppx: ASA    See rec for complete list    Impression/plan: 80 y.o. male who [address], being seen for:    -Right proximal humerus fracture    -Sling on.     -WB status: NWB RUE  -Pain control per primary team  -DVT ppx: Per primary team discretion   -Ice for pain/edema control  -Encourage PT/OT  -Ok to discharge from orthopedic perspective  -Follow up with with Dr. Cm Edwards in 7-10 days  -Please page ortho with any questions      Eliane Hardy,    Orthopedic Surgery Resident PGY-1  [facility], [facility]

## 2022-05-24 NOTE — ED NOTES
PT positioned for comfort. Male external urinary pouch placed to suction.       Magalis Matias RN  05/23/22 2019

## 2022-05-24 NOTE — CARE COORDINATION
Met with pt to complete an SBIRT. Pt is alert and oriented. He denies alcohol or drug use. He denies depression. SBIRT is negative. Alcohol Screening and Brief Intervention        Recent Labs     05/23/22  1646   ALC <10       Alcohol Pre-screening  (MEN ONLY) How many times in the past year have you had 5 or more drinks in a day?: None       Alcohol Screening Audit       Drug Pre-Screening   How many times in the past year have you used a recreational drug or used a prescription medication for nonmedical reasons?: None    Drug Screening DAST       Mood Pre-Screening (PHQ-2)  During the past two weeks, have you been bothered by little interest or pleasure in doing things?: No  During the past two weeks, have you been bothered by feeling down, depressed, or hopeless?: No    Mood Pre-Screening (PHQ-9)         I have interviewed Galina Horta, [id number] regarding  His alcohol consumption/drug use and risk for excessive use. Screenings were negative. Patient  N/A intervention at this time.    Deferred []    Completed on: 5/24/2022   CAMILLA Victoria
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
## 2022-05-24 NOTE — PROGRESS NOTES
Speech Language Pathology  [address]  Speech Language Pathology    SPEECH/COGNITIVE ASSESSMENT    NO LOC,CHI OR CVA/TIA - ST TO DEFER AT THIS TIME      Date: 5/24/2022  Patient Name: Fátima Dean  YOB: 1950   AGE: 80 y.o. MRN: [id number]        PT NOT SEEN FOR SPEECH OR COGNITIVE ASSESSMENT AT THIS TIME AS NO LOC, CHI OR CVA/TIA IS DOCUMENTED. ST TO DEFER AT THIS TIME. PLEASE RE-COSULT AS NEEDED.       Renee Moore, SLP  5/24/2022  7:08 AM

## 2022-05-24 NOTE — CARE COORDINATION
Case Management Initial Discharge Plan  [place],             Met with:patient to discuss discharge plans. Information verified: address, contacts, phone number, , insurance Yes  Insurance Provider: [employer]    Emergency Contact/Next of Kin name & number: Nery Gong (daughter) [phone]  Who are involved in patient's support system? family    PCP: Marylu Villegas DO  Date of last visit: over 1 year      Discharge Planning    Living Arrangements:  Family Members     Home has 2 stories  12 stairs to climb to get into front door, 6 stairs to climb to reach second floor  Location of bedroom/bathroom in home 2nd    Patient able to perform ADL's:Independent    Current Services (outpatient & in home) none  DME equipment: cane, walker, shower chair  DME provider:     Is patient receiving oral anticoagulation therapy? No    Does patient have any issues/concerns obtaining medications? No  If yes, what are patient's concerns? Is there a preferred Pharmacy after hours or on weekends? Yes    If yes, which pharmacy? Potential Assistance Needed:       Patient agreeable to home care: No  Martin of choice provided:  n/a    Prior SNF/Rehab Placement and Facility: no  Agreeable to SNF/Rehab: No  Martin of choice provided: n/a     Evaluation: no    Expected Discharge date:       Patient expects to be discharged to: If home: is the family and/or caregiver wiling & able to provide support at home? yes  Who will be providing this support? family    Follow Up Appointment: Best Day/ Time:      Transportation provider:   Transportation arrangements needed for discharge: No    Readmission Risk              Risk of Unplanned Readmission:  10             Does patient have a readmission risk score greater than 14?: No  If yes, follow-up appointment must be made within 7 days of discharge.      Goals of Care: comfort      Educated patient on transitional options, provided freedom of choice and are agreeable with plan      Discharge Plan: home independently          Electronically signed by Dayan Brennan RN on 5/24/22 at 9:51 AM EDT    1256 met with pt and daughter. They would like home care for therapy. Freedom of choice provided. Referral to [facility]    1430 notified by [employer] at [facility] that they are able to accept.  Notified Abena Cantu RN of need for home care order and britney completed    [id number] PS sent to Daina Bush NP requesting britney and home care order    1800 notified Savana at [facility] of discharge today

## 2022-05-26 ENCOUNTER — HOSPITAL ENCOUNTER (EMERGENCY)
Age: 72
Discharge: HOME | End: 2022-05-26
Attending: EMERGENCY MEDICINE
Payer: COMMERCIAL

## 2022-05-26 ENCOUNTER — APPOINTMENT (OUTPATIENT)
Dept: GENERAL RADIOLOGY | Age: 72
End: 2022-05-26
Payer: COMMERCIAL

## 2022-05-26 VITALS
OXYGEN SATURATION: 98 % | HEART RATE: 91 BPM | WEIGHT: 175 LBS | SYSTOLIC BLOOD PRESSURE: 132 MMHG | BODY MASS INDEX: 25.92 KG/M2 | TEMPERATURE: 97.7 F | DIASTOLIC BLOOD PRESSURE: 69 MMHG | RESPIRATION RATE: 20 BRPM | HEIGHT: 69 IN

## 2022-05-26 DIAGNOSIS — K94.20 COMPLICATION OF GASTROSTOMY TUBE (HCC): Primary | ICD-10-CM

## 2022-05-26 PROCEDURE — 99283 EMERGENCY DEPT VISIT LOW MDM: CPT

## 2022-05-26 PROCEDURE — 6370000000 HC RX 637 (ALT 250 FOR IP): Performed by: STUDENT IN AN ORGANIZED HEALTH CARE EDUCATION/TRAINING PROGRAM

## 2022-05-26 PROCEDURE — 49465 FLUORO EXAM OF G/COLON TUBE: CPT

## 2022-05-26 PROCEDURE — 43762 RPLC GTUBE NO REVJ TRC: CPT

## 2022-05-26 PROCEDURE — 6360000004 HC RX CONTRAST MEDICATION: Performed by: STUDENT IN AN ORGANIZED HEALTH CARE EDUCATION/TRAINING PROGRAM

## 2022-05-26 RX ORDER — ACETAMINOPHEN 325 MG/1
650 TABLET ORAL ONCE
Status: COMPLETED | OUTPATIENT
Start: 2022-05-26 | End: 2022-05-26

## 2022-05-26 RX ORDER — METHOCARBAMOL 500 MG/1
750 TABLET, FILM COATED ORAL ONCE
Status: COMPLETED | OUTPATIENT
Start: 2022-05-26 | End: 2022-05-26

## 2022-05-26 RX ORDER — PYRIDOSTIGMINE BROMIDE 60 MG/1
60 TABLET ORAL ONCE
Status: COMPLETED | OUTPATIENT
Start: 2022-05-26 | End: 2022-05-26

## 2022-05-26 RX ADMIN — METHOCARBAMOL 750 MG: 500 TABLET ORAL at 15:34

## 2022-05-26 RX ADMIN — ACETAMINOPHEN 650 MG: 325 TABLET ORAL at 15:34

## 2022-05-26 RX ADMIN — PYRIDOSTIGMINE BROMIDE 60 MG: 60 TABLET ORAL at 16:16

## 2022-05-26 RX ADMIN — DIATRIZOATE MEGLUMINE AND DIATRIZOATE SODIUM 30 ML: 660; 100 LIQUID ORAL; RECTAL at 13:56

## 2022-05-26 ASSESSMENT — PAIN SCALES - GENERAL: PAINLEVEL_OUTOF10: 8

## 2022-05-26 ASSESSMENT — PAIN - FUNCTIONAL ASSESSMENT: PAIN_FUNCTIONAL_ASSESSMENT: NONE - DENIES PAIN

## 2022-05-26 NOTE — ED NOTES
Pt arrived to ED 03 via triage. Pt co g tube split. Pt denies any pain on arrival.   Pt is resting on stretcher with call light within reach. Breathing is non labored and no acute distress is noted.    Will continue to follow plan of care     Salma Gastelum RN  05/26/22 1449
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
## 2022-05-26 NOTE — ED PROVIDER NOTES
[facility] ED  Emergency Department Encounter  Emergency Medicine Resident     Pt Name: Phill Lesches  MRN: [id number]  [place] 1950  Date of evaluation: 5/26/22  PCP:  Toño Pineda DO    CHIEF COMPLAINT       Chief Complaint   Patient presents with    G Tube Complications       HISTORY OFPRESENT ILLNESS  (Location/Symptom, Timing/Onset, Context/Setting, Quality, Duration, Modifying Factors,Severity.)      Phill Lesches is a 80 y.o. male who presents with cracked G-tube tubing. Patient has G-tube due to history of myasthenia gravis. He had myasthenia gravis crisis in October and was unable to swallow at that time. Since then he has improved, however he had a recent traumatic injury with fractured hip. Since then, he has been unable to ambulate and has been using his G-tube for feedings and medications because it is easier than getting up and going to the kitchen. He says that the G-tube cracked where the clamp is. Denies any dislodgment of the tube, abdominal pain, fevers, chills, nausea or vomiting. PAST MEDICAL / SURGICAL / SOCIAL / FAMILY HISTORY      has a past medical history of Arthritis, Chronic kidney disease, Dysmetabolic syndrome, Hypertension, Iron deficiency anemia, unspecified, Movement disorder, Myasthenia gravis ([address] Proteinuria, and Seizures ([facility] [address] has a past surgical history that includes Quadraceps tendon repair and knee surgery. Social:  reports that he has quit smoking. His smoking use included cigarettes. He smoked 1.00 pack per day. He does not have any smokeless tobacco history on file. He reports that he does not drink alcohol and does not use drugs. Family Hx: History reviewed. No pertinent family history. Allergies:  Pcn [penicillins] and Sulfa antibiotics    Home Medications:  Prior to Admission medications    Medication Sig Start Date End Date Taking?  Authorizing Provider   vitamin D (ERGOCALCIFEROL) 1.25 MG (10397 UT) CAPS capsule Take 1 capsule by mouth once a week for 8 doses 5/24/22 7/13/22  Law Shove, DO   acetaminophen (TYLENOL) 500 MG tablet Take 2 tablets by mouth every 8 hours 5/24/22   Law Shove, DO   polyethylene glycol (GLYCOLAX) 17 g packet Take 17 g by mouth daily 5/24/22 6/23/22  Law Shove, DO   methocarbamol (ROBAXIN) 750 MG tablet Take 1 tablet by mouth every 6 hours for 10 days 5/24/22 6/3/22  Eckerty Shove, DO   metoprolol succinate (TOPROL XL) 50 MG extended release tablet Take 50 mg by mouth daily    Historical Provider, MD   aspirin 81 MG chewable tablet Take 81 mg by mouth daily    Historical Provider, MD   pantoprazole (PROTONIX) 40 MG tablet Take 40 mg by mouth daily    Historical Provider, MD   pyridostigmine (MESTINON) 60 MG tablet Take 60 mg by mouth 5 times daily    Historical Provider, MD   ergocalciferol (DRISDOL) 90621 UNITS capsule Take 1 capsule by mouth once a week. 8/12/13   Pal Gonzalez MD   amLODIPine (NORVASC) 5 MG tablet Take 1 tablet by mouth daily for 14 days. 8/12/13 8/26/13  Carlos De Jesus MD   divalproex (DEPAKOTE) 125 MG DR tablet Take 125 mg by mouth 2 times daily     Historical Provider, MD   metformin (GLUCOPHAGE) 500 MG tablet Take 500 mg by mouth daily (with breakfast). Historical Provider, MD   therapeutic multivitamin-minerals (THERAGRAN-M) tablet Take 1 tablet by mouth daily. Historical Provider, MD       REVIEW OFSYSTEMS    (2-9 systems for level 4, 10 or more for level 5)      Review of Systems   Constitutional: Negative for appetite change, chills, fatigue and fever. HENT: Negative for congestion, rhinorrhea, sneezing and sore throat. Eyes: Negative for visual disturbance. Respiratory: Negative for cough and shortness of breath. Cardiovascular: Negative for chest pain and leg swelling. Gastrointestinal: Negative for abdominal pain, diarrhea, nausea and vomiting. Genitourinary: Negative for dysuria.    Musculoskeletal: Negative for myalgias, neck pain and neck stiffness. Skin: Negative for rash and wound. Neurological: Negative for dizziness, syncope, light-headedness and headaches. Psychiatric/Behavioral: Negative for dysphoric mood and suicidal ideas. PHYSICAL EXAM   (up to 7 for level 4, 8 or more forlevel 5)      INITIAL VITALS:   Vitals:    05/26/22 1315   BP: 132/69   Pulse: 91   Resp: 20   Temp: 97.7 °F (36.5 °C)   SpO2: 98%        Physical Exam  Vitals and nursing note reviewed. Constitutional:       General: He is not in acute distress. Appearance: Normal appearance. He is normal weight. He is not ill-appearing, toxic-appearing or diaphoretic. HENT:      Nose: Nose normal.      Mouth/Throat:      Mouth: Mucous membranes are moist.      Pharynx: Oropharynx is clear. Eyes:      Extraocular Movements: Extraocular movements intact. Conjunctiva/sclera: Conjunctivae normal.      Pupils: Pupils are equal, round, and reactive to light. Cardiovascular:      Rate and Rhythm: Normal rate and regular rhythm. Pulses: Normal pulses. Heart sounds: Normal heart sounds. Pulmonary:      Effort: Pulmonary effort is normal.      Breath sounds: Normal breath sounds. Abdominal:      General: There is no distension. Palpations: Abdomen is soft. Tenderness: There is no abdominal tenderness. There is no right CVA tenderness, left CVA tenderness or guarding. Comments: Traction removable G-tube in place with crack in tubing as noted. G-tube site is clean, dry and intact with no erythema   Musculoskeletal:         General: Normal range of motion. Cervical back: Normal range of motion and neck supple. Skin:     General: Skin is warm. Capillary Refill: Capillary refill takes less than 2 seconds. Neurological:      General: No focal deficit present. Mental Status: He is alert and oriented to person, place, and time.    Psychiatric:         Mood and Affect: Mood normal.         Behavior: Behavior normal. DIFFERENTIAL  DIAGNOSIS     Initial MDM/Plan: 80 y.o. male who presents with cracked G-tube. G-tube has been placed since October due to myasthenia gravis crisis, he now uses it for feeds because of a traumatic hip fracture. He is able to swallow pills however. Vital signs reviewed and within normal limits. Physical examination shows a well-appearing gentleman in no acute distress. Abdomen soft, nondistended nontender. G-tube is in place and site is clean, dry and intact. There is a crack in the G-tube tubing. Plan to remove the G-tube and replace it. We will obtain a x-ray with contrast to confirm G-tube placement. DIAGNOSTIC RESULTS / EMERGENCYDEPARTMENT COURSE / MDM     LABS:  Labs Reviewed - No data to display      RADIOLOGY:  XR INJ CONTRAST GASTRIC TUBE PERC   Final Result   Percutaneous gastrostomy tube is located within the stomach. No extraluminal   contrast extravasation. EMERGENCY DEPARTMENT COURSE:    G-tube placed successfully. We will arrange with social work to get an ambulette to take the patient back to his home due to patient on ambulatory and needing to go up multiple steps. PROCEDURES:  None    CONSULTS:  None      FINAL IMPRESSION      1.  Complication of gastrostomy tube [facility])          DISPOSITION / PLAN     DISPOSITION Decision To Discharge 05/26/2022 02:54:49 PM      PATIENT REFERRED TO:  Wilbert Campos DO  [address] [place] [address] KIMBERLEE Cruz  [address]  [phone]    In 1 week  As needed    [facility]  [address] [place] [address]  [phone]  Go to   If symptoms worsen      DISCHARGE MEDICATIONS:  Discharge Medication List as of 5/26/2022  2:55 PM          Padma Dodge MD  Emergency Medicine Resident    (Please note that portions of this note were completed with a voice recognition program.Efforts were made to edit the dictations but occasionally words are mis-transcribed.)        Padma Dodge MD  Resident  06/01/22 9231

## 2022-05-26 NOTE — ED PROVIDER NOTES
[facility]     Emergency Department     Faculty Attestation    I performed a history and physical examination of the patient and discussed management with the resident. I reviewed the residents note and agree with the documented findings and plan of care. Any areas of disagreement are noted on the chart. I was personally present for the key portions of any procedures. I have documented in the chart those procedures where I was not present during the key portions. I have reviewed the emergency nurses triage note. I agree with the chief complaint, past medical history, past surgical history, allergies, medications, social and family history as documented unless otherwise noted below. For Physician Assistant/ Nurse Practitioner cases/documentation I have personally evaluated this patient and have completed at least one if not all key elements of the E/M (history, physical exam, and MDM). Additional findings are as noted.       Primary Care Physician:  Heriberto Fuller DO    CHIEF COMPLAINT       Chief Complaint   Patient presents with    G Tube Complications       RECENT VITALS:   Temp: 97.7 °F (36.5 °C),  Heart Rate: 91, Resp: 20, BP: 132/69    LABS:  Labs Reviewed - No data to display      PERTINENT ATTENDING PHYSICIAN COMMENTS:    Patient here with G-tube that is cracked he has myasthenia gravis and uses it for feeding he denies any other physical complaints our plan is to replace    Viviane Clifford MD,  MD, [facility]  Attending Emergency  Physician              Juliann Apley, MD  05/1950

## 2022-05-26 NOTE — ED NOTES
Med necessity and face sheet faxed to [phone] [facility]. Awaiting transport time.      [address], W  05/26/22 1429

## 2022-06-01 ASSESSMENT — ENCOUNTER SYMPTOMS
VOMITING: 0
COUGH: 0
RHINORRHEA: 0
SORE THROAT: 0
ABDOMINAL PAIN: 0
NAUSEA: 0
SHORTNESS OF BREATH: 0
DIARRHEA: 0

## 2022-06-07 ENCOUNTER — OFFICE VISIT (OUTPATIENT)
Dept: ORTHOPEDIC SURGERY | Age: 72
End: 2022-06-07

## 2022-06-07 VITALS — BODY MASS INDEX: 25.92 KG/M2 | WEIGHT: 175 LBS | HEIGHT: 69 IN

## 2022-06-07 DIAGNOSIS — S42.201A CLOSED FRACTURE OF PROXIMAL END OF RIGHT HUMERUS, UNSPECIFIED FRACTURE MORPHOLOGY, INITIAL ENCOUNTER: Primary | ICD-10-CM

## 2022-06-07 PROCEDURE — 99024 POSTOP FOLLOW-UP VISIT: CPT | Performed by: ORTHOPAEDIC SURGERY

## 2022-06-07 RX ORDER — HYDROCODONE BITARTRATE AND ACETAMINOPHEN 5; 325 MG/1; MG/1
1 TABLET ORAL EVERY 8 HOURS PRN
Qty: 42 TABLET | Refills: 0 | Status: SHIPPED | OUTPATIENT
Start: 2022-06-07 | End: 2022-06-21

## 2022-06-07 NOTE — PROGRESS NOTES
Chief Complaint   Patient presents with    Follow-up     . ER FUR 05/23/22 - RT PROXIMAL HUMERUS FX   This patient who sustained a four-part fracture right shoulder on 5/24/2022 is seen here in follow-up. Patient does complain of pain but feels it in the posterior aspect of the shoulder. He does not have any numbness or tingling. The patient has had left total hip arthroplasty. It is loose and is going to require revision. The patient also has a PEG tube. Further x-rays taken today show the alignment is maintained but there is significant comminution of the proximal humerus. Diagnosis: 4 part fracture right humerus. Treatment: Discussed with him and his wife that we will except what ever results we get with the close treatment. In about 3 weeks from the time of injury we should start gentle range of motion exercises. In the long-term should he have significant problems then we will have to we will convert this to reverse shoulder. Continue the sling and return in 2 weeks. Next visit AP and scapular Y view of the right shoulder.

## 2022-06-20 DIAGNOSIS — S42.201A CLOSED FRACTURE OF PROXIMAL END OF RIGHT HUMERUS, UNSPECIFIED FRACTURE MORPHOLOGY, INITIAL ENCOUNTER: Primary | ICD-10-CM

## 2022-06-21 ENCOUNTER — OFFICE VISIT (OUTPATIENT)
Dept: ORTHOPEDIC SURGERY | Age: 72
End: 2022-06-21

## 2022-06-21 VITALS — BODY MASS INDEX: 25.92 KG/M2 | WEIGHT: 175 LBS | HEIGHT: 69 IN

## 2022-06-21 DIAGNOSIS — S42.201D CLOSED FRACTURE OF PROXIMAL END OF RIGHT HUMERUS WITH ROUTINE HEALING, UNSPECIFIED FRACTURE MORPHOLOGY, SUBSEQUENT ENCOUNTER: Primary | ICD-10-CM

## 2022-06-21 PROCEDURE — 99024 POSTOP FOLLOW-UP VISIT: CPT | Performed by: ORTHOPAEDIC SURGERY

## 2022-06-21 NOTE — PROGRESS NOTES
-year-old patient who has multiple comorbidities including myasthenia gravis is seen here in follow-up for after he sustained a four-part fracture right shoulder. The injury was on 5/24/2022. The patient still has some pain in the shoulder and using a sling for immobilization. Patient had recently been admitted into the hospital.  Patient also has a history of seizure disorder. Examination: Beyond about 20 degrees of abduction and flexion was painful. Rotation was also painful. Neurologically is intact. X-rays: Further repeat x-rays show there is callus formation both on the medial and the lateral aspect. Diagnosis: 4 part fracture right shoulder. Treatment: Patient was recently admitted. Patient is a poor risk for surgical treatment. He will start physical therapy we will see him in 3 weeks.

## 2022-07-12 ENCOUNTER — OFFICE VISIT (OUTPATIENT)
Dept: ORTHOPEDIC SURGERY | Age: 72
End: 2022-07-12

## 2022-07-12 ENCOUNTER — TELEPHONE (OUTPATIENT)
Dept: ORTHOPEDIC SURGERY | Age: 72
End: 2022-07-12

## 2022-07-12 VITALS — BODY MASS INDEX: 25.92 KG/M2 | WEIGHT: 175 LBS | HEIGHT: 69 IN

## 2022-07-12 DIAGNOSIS — S42.201D CLOSED FRACTURE OF PROXIMAL END OF RIGHT HUMERUS WITH ROUTINE HEALING, UNSPECIFIED FRACTURE MORPHOLOGY, SUBSEQUENT ENCOUNTER: Primary | ICD-10-CM

## 2022-07-12 PROCEDURE — 99024 POSTOP FOLLOW-UP VISIT: CPT | Performed by: ORTHOPAEDIC SURGERY

## 2022-07-12 NOTE — TELEPHONE ENCOUNTER
Mostly needing range of motion and active exercises.   Go slowly on resistive and weightlifting exercises

## 2022-07-22 DIAGNOSIS — S42.201D CLOSED FRACTURE OF PROXIMAL END OF RIGHT HUMERUS WITH ROUTINE HEALING, UNSPECIFIED FRACTURE MORPHOLOGY, SUBSEQUENT ENCOUNTER: Primary | ICD-10-CM

## 2022-07-26 ENCOUNTER — OFFICE VISIT (OUTPATIENT)
Dept: ORTHOPEDIC SURGERY | Age: 72
End: 2022-07-26
Payer: COMMERCIAL

## 2022-07-26 VITALS — BODY MASS INDEX: 25.92 KG/M2 | WEIGHT: 175 LBS | HEIGHT: 69 IN

## 2022-07-26 DIAGNOSIS — M25.641 STIFFNESS OF FINGER JOINT OF RIGHT HAND: ICD-10-CM

## 2022-07-26 DIAGNOSIS — S42.201D CLOSED FRACTURE OF PROXIMAL END OF RIGHT HUMERUS WITH ROUTINE HEALING, UNSPECIFIED FRACTURE MORPHOLOGY, SUBSEQUENT ENCOUNTER: ICD-10-CM

## 2022-07-26 DIAGNOSIS — M19.041 OSTEOARTHRITIS OF METACARPOPHALANGEAL (MCP) JOINT OF RIGHT THUMB: ICD-10-CM

## 2022-07-26 DIAGNOSIS — R52 PAIN: Primary | ICD-10-CM

## 2022-07-26 PROCEDURE — 99212 OFFICE O/P EST SF 10 MIN: CPT | Performed by: ORTHOPAEDIC SURGERY

## 2022-07-26 PROCEDURE — 1123F ACP DISCUSS/DSCN MKR DOCD: CPT | Performed by: ORTHOPAEDIC SURGERY

## 2022-07-26 NOTE — PROGRESS NOTES
This patient with fracture right proximal humerus has been attending physical therapy. He says the progress is very slow. Today he had another complaint that of pain over the left thumb which radiated proximally. He says he has been having this pain for the last 3 weeks. There is no history of injury. Examination: Right shoulder examination shows he has very minimal motion. There has not been any gain in the range of motion and what ever motion he has is through thoracoscapular. Examination of the wrist reveals some tenderness over the knee joint. There was also some tenderness over the scaphoid. The wrist appeared to be swollen. His motions in the MP joint were full but slightly painful. His fingers are also little stiff and not able to make a full fist.    X-rays: Shoulder x-rays showed malunion fracture shoulder. X-rays of the wrist show the patient does have right thumb MP joint arthrosis. The scaphoid appears intact. Diagnosis: #1 malunion fracture right shoulder. #2 osteoarthritis MP joint right thumb. Treatment: He will continue therapy but mainly for the thumb. I have also demonstrated him mobilizing exercises for the fingers and the thumb. He will do this almost constantly. Seen for clinical evaluation in 3 weeks.

## 2022-09-06 ENCOUNTER — TELEPHONE (OUTPATIENT)
Dept: ORTHOPEDIC SURGERY | Age: 72
End: 2022-09-06

## 2022-12-08 ENCOUNTER — HOSPITAL ENCOUNTER (INPATIENT)
Age: 72
LOS: 4 days | Discharge: HOME HEALTH | DRG: 417 | End: 2022-12-13
Attending: EMERGENCY MEDICINE | Admitting: FAMILY MEDICINE
Payer: COMMERCIAL

## 2022-12-08 DIAGNOSIS — G40.909 SEIZURE DISORDER (HCC): ICD-10-CM

## 2022-12-08 DIAGNOSIS — K80.20 IMPACTED GALLSTONE OF GALLBLADDER: ICD-10-CM

## 2022-12-08 DIAGNOSIS — K85.10 GALLSTONE PANCREATITIS: Primary | ICD-10-CM

## 2022-12-08 DIAGNOSIS — K81.0 ACUTE CHOLECYSTITIS: ICD-10-CM

## 2022-12-08 DIAGNOSIS — K80.21 CALCULUS OF GALLBLADDER WITH BILIARY OBSTRUCTION BUT WITHOUT CHOLECYSTITIS: ICD-10-CM

## 2022-12-08 DIAGNOSIS — G70.01 MYASTHENIA GRAVIS WITH EXACERBATION (HCC): ICD-10-CM

## 2022-12-08 LAB
ABSOLUTE EOS #: 0.2 K/UL (ref 0–0.44)
ABSOLUTE IMMATURE GRANULOCYTE: 0.07 K/UL (ref 0–0.3)
ABSOLUTE LYMPH #: 1.36 K/UL (ref 1.1–3.7)
ABSOLUTE MONO #: 0.69 K/UL (ref 0.1–1.2)
ALBUMIN SERPL-MCNC: 4.5 G/DL (ref 3.5–5.2)
ALBUMIN/GLOBULIN RATIO: 1.5 (ref 1–2.5)
ALP BLD-CCNC: 133 U/L (ref 40–129)
ALT SERPL-CCNC: 21 U/L (ref 5–41)
ANION GAP SERPL CALCULATED.3IONS-SCNC: 15 MMOL/L (ref 9–17)
AST SERPL-CCNC: 24 U/L
BASOPHILS # BLD: 1 % (ref 0–2)
BASOPHILS ABSOLUTE: 0.06 K/UL (ref 0–0.2)
BILIRUB SERPL-MCNC: 0.4 MG/DL (ref 0.3–1.2)
BUN BLDV-MCNC: 20 MG/DL (ref 8–23)
CALCIUM SERPL-MCNC: 9.4 MG/DL (ref 8.6–10.4)
CHLORIDE BLD-SCNC: 103 MMOL/L (ref 98–107)
CO2: 21 MMOL/L (ref 20–31)
CREAT SERPL-MCNC: 1.7 MG/DL (ref 0.7–1.2)
EOSINOPHILS RELATIVE PERCENT: 2 % (ref 1–4)
GFR SERPL CREATININE-BSD FRML MDRD: 43 ML/MIN/1.73M2
GLUCOSE BLD-MCNC: 121 MG/DL (ref 70–99)
HCT VFR BLD CALC: 45.2 % (ref 40.7–50.3)
HEMOGLOBIN: 14.6 G/DL (ref 13–17)
IMMATURE GRANULOCYTES: 1 %
LIPASE: 75 U/L (ref 13–60)
LYMPHOCYTES # BLD: 12 % (ref 24–43)
MCH RBC QN AUTO: 28 PG (ref 25.2–33.5)
MCHC RBC AUTO-ENTMCNC: 32.3 G/DL (ref 28.4–34.8)
MCV RBC AUTO: 86.8 FL (ref 82.6–102.9)
MONOCYTES # BLD: 6 % (ref 3–12)
NRBC AUTOMATED: 0 PER 100 WBC
PDW BLD-RTO: 14.1 % (ref 11.8–14.4)
PLATELET # BLD: 270 K/UL (ref 138–453)
PMV BLD AUTO: 11 FL (ref 8.1–13.5)
POTASSIUM SERPL-SCNC: 4.6 MMOL/L (ref 3.7–5.3)
RBC # BLD: 5.21 M/UL (ref 4.21–5.77)
SEG NEUTROPHILS: 78 % (ref 36–65)
SEGMENTED NEUTROPHILS ABSOLUTE COUNT: 8.99 K/UL (ref 1.5–8.1)
SODIUM BLD-SCNC: 139 MMOL/L (ref 135–144)
TOTAL PROTEIN: 7.6 G/DL (ref 6.4–8.3)
WBC # BLD: 11.4 K/UL (ref 3.5–11.3)

## 2022-12-08 PROCEDURE — 93005 ELECTROCARDIOGRAM TRACING: CPT | Performed by: FAMILY MEDICINE

## 2022-12-08 PROCEDURE — 96375 TX/PRO/DX INJ NEW DRUG ADDON: CPT

## 2022-12-08 PROCEDURE — 6360000002 HC RX W HCPCS: Performed by: STUDENT IN AN ORGANIZED HEALTH CARE EDUCATION/TRAINING PROGRAM

## 2022-12-08 PROCEDURE — 2500000003 HC RX 250 WO HCPCS: Performed by: STUDENT IN AN ORGANIZED HEALTH CARE EDUCATION/TRAINING PROGRAM

## 2022-12-08 PROCEDURE — 2580000003 HC RX 258: Performed by: STUDENT IN AN ORGANIZED HEALTH CARE EDUCATION/TRAINING PROGRAM

## 2022-12-08 PROCEDURE — 80053 COMPREHEN METABOLIC PANEL: CPT

## 2022-12-08 PROCEDURE — 83605 ASSAY OF LACTIC ACID: CPT

## 2022-12-08 PROCEDURE — 85025 COMPLETE CBC W/AUTO DIFF WBC: CPT

## 2022-12-08 PROCEDURE — 81001 URINALYSIS AUTO W/SCOPE: CPT

## 2022-12-08 PROCEDURE — 99285 EMERGENCY DEPT VISIT HI MDM: CPT

## 2022-12-08 PROCEDURE — 96374 THER/PROPH/DIAG INJ IV PUSH: CPT

## 2022-12-08 PROCEDURE — 83690 ASSAY OF LIPASE: CPT

## 2022-12-08 RX ORDER — 0.9 % SODIUM CHLORIDE 0.9 %
1000 INTRAVENOUS SOLUTION INTRAVENOUS ONCE
Status: COMPLETED | OUTPATIENT
Start: 2022-12-08 | End: 2022-12-08

## 2022-12-08 RX ORDER — SODIUM CHLORIDE 0.9 % (FLUSH) 0.9 %
3 SYRINGE (ML) INJECTION EVERY 8 HOURS
Status: DISCONTINUED | OUTPATIENT
Start: 2022-12-08 | End: 2022-12-13 | Stop reason: HOSPADM

## 2022-12-08 RX ORDER — FAMOTIDINE 10 MG/ML
20 INJECTION, SOLUTION INTRAVENOUS ONCE
Status: COMPLETED | OUTPATIENT
Start: 2022-12-08 | End: 2022-12-08

## 2022-12-08 RX ORDER — ONDANSETRON 2 MG/ML
4 INJECTION INTRAMUSCULAR; INTRAVENOUS ONCE
Status: COMPLETED | OUTPATIENT
Start: 2022-12-08 | End: 2022-12-08

## 2022-12-08 RX ORDER — MORPHINE SULFATE 4 MG/ML
4 INJECTION, SOLUTION INTRAMUSCULAR; INTRAVENOUS ONCE
Status: COMPLETED | OUTPATIENT
Start: 2022-12-08 | End: 2022-12-08

## 2022-12-08 RX ADMIN — Medication 3 ML: at 22:47

## 2022-12-08 RX ADMIN — FAMOTIDINE 20 MG: 10 INJECTION, SOLUTION INTRAVENOUS at 22:48

## 2022-12-08 RX ADMIN — ONDANSETRON 4 MG: 2 INJECTION INTRAMUSCULAR; INTRAVENOUS at 22:48

## 2022-12-08 RX ADMIN — MORPHINE SULFATE 4 MG: 4 INJECTION INTRAVENOUS at 22:48

## 2022-12-08 RX ADMIN — SODIUM CHLORIDE 1000 ML: 9 INJECTION, SOLUTION INTRAVENOUS at 22:48

## 2022-12-08 ASSESSMENT — PAIN - FUNCTIONAL ASSESSMENT: PAIN_FUNCTIONAL_ASSESSMENT: 0-10

## 2022-12-08 ASSESSMENT — ENCOUNTER SYMPTOMS
SHORTNESS OF BREATH: 0
DIARRHEA: 1
SINUS PAIN: 0
EYE PAIN: 0
ABDOMINAL PAIN: 1
SORE THROAT: 0
BACK PAIN: 1
VOMITING: 0
NAUSEA: 0
COUGH: 0

## 2022-12-08 ASSESSMENT — PAIN SCALES - GENERAL: PAINLEVEL_OUTOF10: 6

## 2022-12-09 ENCOUNTER — APPOINTMENT (OUTPATIENT)
Dept: CT IMAGING | Age: 72
DRG: 417 | End: 2022-12-09
Payer: COMMERCIAL

## 2022-12-09 ENCOUNTER — APPOINTMENT (OUTPATIENT)
Dept: ULTRASOUND IMAGING | Age: 72
DRG: 417 | End: 2022-12-09
Payer: COMMERCIAL

## 2022-12-09 PROBLEM — K80.20 IMPACTED GALLSTONE OF GALLBLADDER: Status: ACTIVE | Noted: 2022-12-09

## 2022-12-09 LAB
ABO/RH: NORMAL
ANTIBODY SCREEN: NEGATIVE
ARM BAND NUMBER: NORMAL
BILIRUBIN URINE: NEGATIVE
COLOR: YELLOW
EKG ATRIAL RATE: 101 BPM
EKG Q-T INTERVAL: 428 MS
EKG QRS DURATION: 128 MS
EKG QTC CALCULATION (BAZETT): 515 MS
EKG R AXIS: -31 DEGREES
EKG T AXIS: 48 DEGREES
EKG VENTRICULAR RATE: 87 BPM
EPITHELIAL CELLS UA: NORMAL /HPF (ref 0–5)
EXPIRATION DATE: NORMAL
GLUCOSE URINE: NEGATIVE
INR BLD: 1.1
KETONES, URINE: NEGATIVE
LACTIC ACID, WHOLE BLOOD: 1.3 MMOL/L (ref 0.7–2.1)
LACTIC ACID, WHOLE BLOOD: 4.1 MMOL/L (ref 0.7–2.1)
LEUKOCYTE ESTERASE, URINE: NEGATIVE
NITRITE, URINE: NEGATIVE
PARTIAL THROMBOPLASTIN TIME: 28.1 SEC (ref 20.5–30.5)
PH UA: 8.5 (ref 5–8)
PROTEIN UA: ABNORMAL
PROTHROMBIN TIME: 11.5 SEC (ref 9.1–12.3)
RBC UA: NORMAL /HPF (ref 0–4)
SPECIFIC GRAVITY UA: 1.01 (ref 1–1.03)
TURBIDITY: CLEAR
URINE HGB: NEGATIVE
UROBILINOGEN, URINE: NORMAL
WBC UA: NORMAL /HPF (ref 0–5)

## 2022-12-09 PROCEDURE — 2580000003 HC RX 258: Performed by: STUDENT IN AN ORGANIZED HEALTH CARE EDUCATION/TRAINING PROGRAM

## 2022-12-09 PROCEDURE — 83605 ASSAY OF LACTIC ACID: CPT

## 2022-12-09 PROCEDURE — 76705 ECHO EXAM OF ABDOMEN: CPT

## 2022-12-09 PROCEDURE — 74177 CT ABD & PELVIS W/CONTRAST: CPT

## 2022-12-09 PROCEDURE — 6360000002 HC RX W HCPCS: Performed by: FAMILY MEDICINE

## 2022-12-09 PROCEDURE — 85730 THROMBOPLASTIN TIME PARTIAL: CPT

## 2022-12-09 PROCEDURE — 86900 BLOOD TYPING SEROLOGIC ABO: CPT

## 2022-12-09 PROCEDURE — 6370000000 HC RX 637 (ALT 250 FOR IP): Performed by: FAMILY MEDICINE

## 2022-12-09 PROCEDURE — 6370000000 HC RX 637 (ALT 250 FOR IP): Performed by: STUDENT IN AN ORGANIZED HEALTH CARE EDUCATION/TRAINING PROGRAM

## 2022-12-09 PROCEDURE — 1200000000 HC SEMI PRIVATE

## 2022-12-09 PROCEDURE — 86901 BLOOD TYPING SEROLOGIC RH(D): CPT

## 2022-12-09 PROCEDURE — 85610 PROTHROMBIN TIME: CPT

## 2022-12-09 PROCEDURE — 2580000003 HC RX 258: Performed by: FAMILY MEDICINE

## 2022-12-09 PROCEDURE — 36415 COLL VENOUS BLD VENIPUNCTURE: CPT

## 2022-12-09 PROCEDURE — 6360000004 HC RX CONTRAST MEDICATION: Performed by: STUDENT IN AN ORGANIZED HEALTH CARE EDUCATION/TRAINING PROGRAM

## 2022-12-09 PROCEDURE — 86850 RBC ANTIBODY SCREEN: CPT

## 2022-12-09 RX ORDER — SODIUM CHLORIDE 0.9 % (FLUSH) 0.9 %
5-40 SYRINGE (ML) INJECTION EVERY 12 HOURS SCHEDULED
Status: DISCONTINUED | OUTPATIENT
Start: 2022-12-09 | End: 2022-12-13 | Stop reason: HOSPADM

## 2022-12-09 RX ORDER — ACETAMINOPHEN 325 MG/1
650 TABLET ORAL EVERY 4 HOURS PRN
Status: DISCONTINUED | OUTPATIENT
Start: 2022-12-09 | End: 2022-12-13 | Stop reason: HOSPADM

## 2022-12-09 RX ORDER — 0.9 % SODIUM CHLORIDE 0.9 %
1000 INTRAVENOUS SOLUTION INTRAVENOUS ONCE
Status: COMPLETED | OUTPATIENT
Start: 2022-12-09 | End: 2022-12-09

## 2022-12-09 RX ORDER — DIVALPROEX SODIUM 125 MG/1
125 TABLET, DELAYED RELEASE ORAL 2 TIMES DAILY
Status: DISCONTINUED | OUTPATIENT
Start: 2022-12-09 | End: 2022-12-13 | Stop reason: HOSPADM

## 2022-12-09 RX ORDER — PYRIDOSTIGMINE BROMIDE 60 MG/1
60 TABLET ORAL
Status: DISCONTINUED | OUTPATIENT
Start: 2022-12-09 | End: 2022-12-13 | Stop reason: HOSPADM

## 2022-12-09 RX ORDER — M-VIT,TX,IRON,MINS/CALC/FOLIC 27MG-0.4MG
1 TABLET ORAL DAILY
Status: DISCONTINUED | OUTPATIENT
Start: 2022-12-09 | End: 2022-12-13 | Stop reason: HOSPADM

## 2022-12-09 RX ORDER — METOPROLOL SUCCINATE 50 MG/1
50 TABLET, EXTENDED RELEASE ORAL DAILY
Status: DISCONTINUED | OUTPATIENT
Start: 2022-12-09 | End: 2022-12-13 | Stop reason: HOSPADM

## 2022-12-09 RX ORDER — ASPIRIN 81 MG/1
81 TABLET, CHEWABLE ORAL DAILY
Status: DISCONTINUED | OUTPATIENT
Start: 2022-12-09 | End: 2022-12-13 | Stop reason: HOSPADM

## 2022-12-09 RX ORDER — METHYLPREDNISOLONE SODIUM SUCCINATE 40 MG/ML
20 INJECTION, POWDER, LYOPHILIZED, FOR SOLUTION INTRAMUSCULAR; INTRAVENOUS EVERY 8 HOURS
Status: COMPLETED | OUTPATIENT
Start: 2022-12-10 | End: 2022-12-10

## 2022-12-09 RX ORDER — SODIUM CHLORIDE 9 MG/ML
INJECTION, SOLUTION INTRAVENOUS PRN
Status: DISCONTINUED | OUTPATIENT
Start: 2022-12-09 | End: 2022-12-13 | Stop reason: HOSPADM

## 2022-12-09 RX ORDER — SODIUM CHLORIDE 9 MG/ML
INJECTION, SOLUTION INTRAVENOUS CONTINUOUS
Status: DISCONTINUED | OUTPATIENT
Start: 2022-12-09 | End: 2022-12-12

## 2022-12-09 RX ORDER — PANTOPRAZOLE SODIUM 40 MG/1
40 TABLET, DELAYED RELEASE ORAL DAILY
Status: DISCONTINUED | OUTPATIENT
Start: 2022-12-09 | End: 2022-12-13 | Stop reason: HOSPADM

## 2022-12-09 RX ORDER — SODIUM CHLORIDE 0.9 % (FLUSH) 0.9 %
5-40 SYRINGE (ML) INJECTION PRN
Status: DISCONTINUED | OUTPATIENT
Start: 2022-12-09 | End: 2022-12-13 | Stop reason: HOSPADM

## 2022-12-09 RX ORDER — ONDANSETRON 2 MG/ML
4 INJECTION INTRAMUSCULAR; INTRAVENOUS EVERY 6 HOURS PRN
Status: DISCONTINUED | OUTPATIENT
Start: 2022-12-09 | End: 2022-12-13 | Stop reason: HOSPADM

## 2022-12-09 RX ORDER — ONDANSETRON 4 MG/1
4 TABLET, ORALLY DISINTEGRATING ORAL EVERY 8 HOURS PRN
Status: DISCONTINUED | OUTPATIENT
Start: 2022-12-09 | End: 2022-12-13 | Stop reason: HOSPADM

## 2022-12-09 RX ORDER — ENOXAPARIN SODIUM 100 MG/ML
40 INJECTION SUBCUTANEOUS DAILY
Status: DISCONTINUED | OUTPATIENT
Start: 2022-12-09 | End: 2022-12-10

## 2022-12-09 RX ORDER — AMLODIPINE BESYLATE 5 MG/1
5 TABLET ORAL DAILY
Status: DISCONTINUED | OUTPATIENT
Start: 2022-12-09 | End: 2022-12-13 | Stop reason: HOSPADM

## 2022-12-09 RX ADMIN — PYRIDOSTIGMINE BROMIDE 60 MG: 60 TABLET ORAL at 20:28

## 2022-12-09 RX ADMIN — SODIUM CHLORIDE, PRESERVATIVE FREE 10 ML: 5 INJECTION INTRAVENOUS at 20:28

## 2022-12-09 RX ADMIN — PYRIDOSTIGMINE BROMIDE 60 MG: 60 TABLET ORAL at 22:36

## 2022-12-09 RX ADMIN — SODIUM CHLORIDE 1000 ML: 9 INJECTION, SOLUTION INTRAVENOUS at 00:45

## 2022-12-09 RX ADMIN — PANTOPRAZOLE SODIUM 40 MG: 40 TABLET, DELAYED RELEASE ORAL at 17:58

## 2022-12-09 RX ADMIN — METOPROLOL SUCCINATE 50 MG: 50 TABLET, FILM COATED, EXTENDED RELEASE ORAL at 17:58

## 2022-12-09 RX ADMIN — DIVALPROEX SODIUM 125 MG: 125 TABLET, DELAYED RELEASE ORAL at 22:36

## 2022-12-09 RX ADMIN — Medication 1 TABLET: at 17:58

## 2022-12-09 RX ADMIN — ASPIRIN 81 MG: 81 TABLET, CHEWABLE ORAL at 17:59

## 2022-12-09 RX ADMIN — AMLODIPINE BESYLATE 5 MG: 5 TABLET ORAL at 20:28

## 2022-12-09 RX ADMIN — Medication 3 ML: at 05:52

## 2022-12-09 RX ADMIN — SODIUM CHLORIDE: 9 INJECTION, SOLUTION INTRAVENOUS at 05:52

## 2022-12-09 RX ADMIN — ONDANSETRON 4 MG: 2 INJECTION INTRAMUSCULAR; INTRAVENOUS at 13:43

## 2022-12-09 RX ADMIN — PYRIDOSTIGMINE BROMIDE 60 MG: 60 TABLET ORAL at 14:32

## 2022-12-09 RX ADMIN — PYRIDOSTIGMINE BROMIDE 60 MG: 60 TABLET ORAL at 06:55

## 2022-12-09 RX ADMIN — IOPAMIDOL 75 ML: 755 INJECTION, SOLUTION INTRAVENOUS at 00:18

## 2022-12-09 ASSESSMENT — PAIN SCALES - GENERAL
PAINLEVEL_OUTOF10: 6
PAINLEVEL_OUTOF10: 4
PAINLEVEL_OUTOF10: 4

## 2022-12-09 ASSESSMENT — ENCOUNTER SYMPTOMS
SHORTNESS OF BREATH: 0
TROUBLE SWALLOWING: 0

## 2022-12-09 NOTE — PROGRESS NOTES
Surgery Attending      Ultrasound performed this AM reviewed. No signs of acute cholecystitis. The patient states that his pain is resolved. I explained to him that due to OR availability his case may be delayed and since he does not have acute cholecystitis based on his pain resolving and ultrasound findings he can probably follow up outpatient for elective cholecystectomy. His pain is likely related to symptomatic cholelithiasis and would probably benefit from cholecystectomy at some point. The patient and daughter were agreeable to follow-up as outpatient for cholecystectomy. The daughter expressed concern with his insurance and whether it would cover surgery here. I advised her to call the office and if it is covered here I am happy to schedule him here or if not his PCP can refer him to a surgeon within network.     Chris Livingston MD FACS

## 2022-12-09 NOTE — ED NOTES
The following labs were labeled with appropriate pt sticker and tubed to lab:     [] Blue     [] Lavender   [] on ice  [] Green/yellow  [] Green/black [] on ice  [] Susan Blood  [] on ice  [] Yellow  [] Red  [x] Type/ Screen  [] ABG  [] VBG    [] COVID-19 swab    [] Rapid  [] PCR  [] Flu swab  [] Peds Viral Panel     [] Urine Sample  [] Fecal Sample  [] Pelvic Cultures  [] Blood Cultures  [] X 2  [] STREP Cultures         Renée German RN  12/09/22 6319

## 2022-12-09 NOTE — CONSULTS
General Surgery   History and Physical      PATIENT NAME: Guillermo Pereira   YOB: 1950    ADMISSION DATE: 12/8/2022 10:27 PM      TODAY'S DATE: 12/9/2022    CHIEF COMPLAINT:  pain in the right upper quadrant x <1 day      HISTORY OF PRESENT ILLNESS:  The patient is a 71 y.o. male  with past medical history of Myasthenia gravis, hip replacement surgery, PEG tube placement in Jan 2022 for dysphagia, who presents with complain of pain in the right upper quadrant for past 12 hours. He endorses eating sausage in the afternoon after which this pain started. It was sudden onset, moderate to severe in intensity, continuously present, radiating to right side back. No aggravating or relieving factors. Not associated with nausea or vomiting. There is no history of similar pain. Patient does have chronic diarrhea for 4 weeks and it has not changed with the pain. Past Medical History:        Diagnosis Date    Arthritis     Chronic kidney disease     Dysmetabolic syndrome     Hypertension     Iron deficiency anemia, unspecified     Movement disorder     Myasthenia gravis ([address]     Proteinuria     Seizures ([address]        Past Surgical History:        Procedure Laterality Date    KNEE SURGERY      QUADRACEPS TENDON REPAIR         Medications Prior to Admission:   Not in a hospital admission.     Allergies:  Pcn [penicillins] and Sulfa antibiotics    Social History:   Social History     Socioeconomic History    Marital status:      Spouse name: Not on file    Number of children: Not on file    Years of education: Not on file    Highest education level: Not on file   Occupational History    Not on file   Tobacco Use    Smoking status: Former     Packs/day: 1.00     Types: Cigarettes    Smokeless tobacco: Never   Substance and Sexual Activity    Alcohol use: No    Drug use: No    Sexual activity: Not on file   Other Topics Concern    Not on file   Social History Narrative    Not on file     Social Determinants of Health     Financial Resource Strain: Not on file   Food Insecurity: Not on file   Transportation Needs: Not on file   Physical Activity: Not on file   Stress: Not on file   Social Connections: Not on file   Intimate Partner Violence: Not on file   Housing Stability: Not on file       Family History:   No family history on file. REVIEW OF SYSTEMS:    General: Denies fever, chills, weight loss. Reports normal energy levels  HEENT: Denies sore throat, sinus problems, allergic rhinosinusitis  Cardiovascular: Denies chest pain, palpitations, orthopnea  Pulmonary: Denies cough, shortness of breath  GI:   see HPI  : Denies polyuria, dysuria, hematuria  Endocrine: Denies diabetes, thyroid problems. Hem/Onc: Denies hx of bleeding disorders, Denies hx of cancer, has history of myasthenia  Neurological: Denies h/o CVA, TIA  Skin: Denies rashes, ulcers  Musculoskeletal: Denies muscle, joint, back pain      PHYSICAL EXAM:    VITALS:  BP (!) 167/80   Pulse 100   Temp 97.5 °F (36.4 °C) (Oral)   Resp 22   Wt 165 lb (74.8 kg)   SpO2 98%   BMI 24.37 kg/m²   INTAKE/OUTPUT:   No intake or output data in the 24 hours ending 12/09/22 0315    CONSTITUTIONAL: awake, alert, cooperative, no apparent distress  HEAD: atraumatic, normocephalic  EYES: sclera clear, pupils equal and reactive to light  ENT: ears are symmetric, nares patent   HEENT: moist mucous membranes  NECK: Supple, symmetrical, trachea midline  LUNGS: no respiratory distress, no audible wheezing  CARDIOVASCULAR: +S1/S2  ABDOMEN: soft, tender in the RUQ, Gimenez sign present, nondistended, no guarding, no rebound tenderness. Old scar of PEG tube present in the epigastric region.    MUSCULOSKELETAL: full range of motion noted  NEUROLOGIC: Awake, alert, oriented to name, place and time  EXTREMITIES: peripheral pulses normal, no pedal edema, no calf tenderness  SKIN: normal coloration and turgor      CBC with Differential:    Lab Results   Component Value Date/Time    WBC 11.4 12/08/2022 11:04 PM    RBC 5.21 12/08/2022 11:04 PM    RBC 04855 02/28/2022 03:42 PM    HGB 14.6 12/08/2022 11:04 PM    HCT 45.2 12/08/2022 11:04 PM     12/08/2022 11:04 PM    MCV 86.8 12/08/2022 11:04 PM    MCH 28.0 12/08/2022 11:04 PM    MCHC 32.3 12/08/2022 11:04 PM    RDW 14.1 12/08/2022 11:04 PM    NRBC 0 01/31/2022 10:13 AM    METASPCT 2 07/27/2016 06:18 AM    LYMPHOPCT 12 12/08/2022 11:04 PM    LYMPHOPCT 5 02/28/2022 03:42 PM    MONOPCT 6 12/08/2022 11:04 PM    MONOPCT 6.9 01/31/2022 10:13 AM    BASOPCT 1 12/08/2022 11:04 PM    BASOPCT 0.6 01/31/2022 10:13 AM    MONOSABS 0.69 12/08/2022 11:04 PM    MONOSABS 1.0 01/31/2022 10:13 AM    LYMPHSABS 1.36 12/08/2022 11:04 PM    LYMPHSABS 2.1 01/31/2022 10:13 AM    EOSABS 0.20 12/08/2022 11:04 PM    EOSABS 0.1 01/31/2022 10:13 AM    BASOSABS 0.06 12/08/2022 11:04 PM    DIFFTYPE NOT REPORTED 12/17/2021 08:25 PM     CMP:    Lab Results   Component Value Date/Time     12/08/2022 11:04 PM    K 4.6 12/08/2022 11:04 PM     12/08/2022 11:04 PM    CO2 21 12/08/2022 11:04 PM    BUN 20 12/08/2022 11:04 PM    CREATININE 1.70 12/08/2022 11:04 PM    GFRAA 46 05/24/2022 06:17 AM    LABGLOM 43 12/08/2022 11:04 PM    GLUCOSE 121 12/08/2022 11:04 PM    GLUCOSE 107 02/28/2022 03:42 PM    PROT 7.6 12/08/2022 11:04 PM    PROT 3.7 02/28/2022 03:42 PM    LABALBU 4.5 12/08/2022 11:04 PM    CALCIUM 9.4 12/08/2022 11:04 PM    BILITOT 0.4 12/08/2022 11:04 PM    ALKPHOS 133 12/08/2022 11:04 PM    AST 24 12/08/2022 11:04 PM    ALT 21 12/08/2022 11:04 PM       Pertinent Radiology:    CT Abdomen and pelvis - 12/8/2022 -   There is 14 mm gallstone in the region of the gallbladder neck (axial   image 54, coronal image 44). This faintly rim calcified gallstone was   previous located in the body the gallbladder on the study of 05/23/2022. The   gallbladder is moderately dilated. No pericholecystic fluid.   There is   minimal dilatation of intrahepatic bile ducts. There is a small   periampullary duodenal diverticulum. There are bilateral renal   cysts measuring up to 9.5 cm in the right kidney. ASSESSMENT     72y/M with history of myasthenia gravis, hip surgery, PEG tube placement, came to ED with complalin of pain in the right upper quadrant for <12 hours. Examination shows Gimenez sign positive. CT scan shows distended gall bladder with stone at the neck. Of note, he has multiple renal cysts. Principal Problem:    Impacted gallstone of gallbladder  Resolved Problems:    * No resolved hospital problems. *      PLAN  To be discussed with with Dr. Vikki Posey for Robot assisted Laparoscopic Cholecystectomy possible open, today   NPO with maintenance IVF for OR today.   Pre-op   Consent  Timing of OR TBD  Ok for DVT prophylaxis  Rest of the care per primary      ------------------------------------------------------------------  Cierra Mcdaniel MD , PGY-2  Department of [address] [place] [address].  12/9/2022 at 3:15 AM
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
## 2022-12-09 NOTE — ED NOTES
The following labs were labeled with appropriate pt sticker and tubed to lab:     [] Blue     [] Lavender   [] on ice  [] Green/yellow  [x] Green/black [x] on ice  [] Charls Petties  [] on ice  [] Yellow  [] Red  [] Type/ Screen  [] ABG  [] VBG    [] COVID-19 swab    [] Rapid  [] PCR  [] Flu swab  [] Peds Viral Panel     [] Urine Sample  [] Fecal Sample  [] Pelvic Cultures  [] Blood Cultures  [] X 2  [] STREP Cultures         Lisa Quintero RN  12/09/22 0148

## 2022-12-09 NOTE — PLAN OF CARE
BRIEF GI NOTE    12/9/2022    71year-old male admitted with nausea vomiting abdominal pain. Patient was found to have a 14 mm gallstone impacted in the cystic duct seen on abdominal CT imaging. Patient was planned for cholecystectomy today but due to scheduling conflict and the fact that patient on reevaluation per general surgery had resolution of symptoms, thus cholecystectomy was recommended as an outpatient. GI was consulted since patient is having recurrent pain and nausea and intolerance to oral intake. GI was asked to intervene. I explained to the patient's PCP Dr. Erasmo Henley that the patient has a gallstone that is possibly causing recurrence of symptoms and recommended to reach out to general surgery to reevaluate the patient for cholecystectomy. Unfortunately there is no endoscopic/GI endoscopic intervention that would be helpful in resolving this issue besides medications. Dr. Erasmo Henley verbalized understanding and was grateful for the call. I also informed general surgery attending Dr. Nicolasa Moore regarding the above. No further recommendation from GI standpoint.     Brenda Chin MD  Gastroenterologist  [address] [place] [address]

## 2022-12-09 NOTE — CARE COORDINATION
12/09/22 1306   Service Assessment   Patient Orientation Alert and Oriented   Cognition Alert   History Provided By Patient   Primary [address]   PCP Verified by CM Yes   Last Visit to PCP Within last year   Current Functional Level Independent in ADLs/IADLs   Can patient return to prior living arrangement Yes   Ability to make needs known: Good   Family able to assist with home care needs: Yes   Would you like for me to discuss the discharge plan with any other family members/significant others, and if so, who? No   Financial Resources Medicare   Social/Functional History   Lives With Family  (lives with brother)   Type of [address] Two level   [address] to enter without rails   Entrance Stairs - Number of Steps 2   Bathroom Shower/Tub Tub/Shower unit   [address] bars in shower   Bathroom Accessibility Not accessible   Home Equipment [employer] [address] Responsibilities No   Ambulation Assistance Independent   Transfer Assistance Independent   Active  Yes   Mode of Transportation SUV   Occupation Retired   Discharge Planning   Type of [facility] Members  (lives with brother)   Current Services Prior To Admission None   Potential Assistance Needed N/A   DME Ordered? No   Potential Assistance Purchasing Medications No   Patient expects to be discharged to: House   One/Two Story Residence Two story   Lift Chair Available No   History of falls?  0   Services At/After Discharge   Services At/After Discharge None   Mode of Transport at Discharge   (daughter)   Confirm Follow Up Transport Family   Condition of Participation: Discharge Planning   The Plan for Transition of Care is related to the following treatment goals: goal is home with brother   The Patient and/or Patient Representative was provided with a Choice of Provider? (NA)   Case Management Assessment  Initial Evaluation    Date/Time of Evaluation: 12/9/2022 1:09 PM  Assessment Completed by: Betty Pizarro RN    If patient is discharged prior to next notation, then this note serves as note for discharge by case management. Patient Name: Marylu Wilkinson                   YOB: 1950  Diagnosis: Impacted gallstone of gallbladder [K80.20]  Gallstone pancreatitis [K85.10]  Calculus of gallbladder with biliary obstruction but without cholecystitis [K80.21]                   Date / Time: 12/8/2022 10:27 PM    Patient Admission Status: Inpatient   Readmission Risk (Low < 19, Mod (19-27), High > 27): Readmission Risk Score: 13.5    Current PCP: Krista Wells, DO  PCP verified by CM? (P) Yes    Chart Reviewed: Yes      History Provided by: (P) Patient  Patient Orientation: (P) Alert and Oriented    Patient Cognition: (P) Alert    Hospitalization in the last 30 days (Readmission):  No    If yes, Readmission Assessment in  Navigator will be completed.     Advance Directives:      Code Status: Full Code   Patient's Primary Decision Maker is:        Discharge Planning:    Patient lives with: (P) Family Members (lives with brother) Type of Home: (P) House  Primary Care Giver: (P) Self  Patient Support Systems include: (P) Children   Current Financial resources: (P) Medicare  Current community resources:    Current services prior to admission: (P) None            Current DME:              Type of Home Care services:       ADLS  Prior functional level:    Current functional level: (P) Independent in ADLs/IADLs    PT AM-PAC:   /24  OT AM-PAC:   /24    Family can provide assistance at DC: (P) Yes  Would you like Case Management to discuss the discharge plan with any other family members/significant others, and if so, who? (P) No  Plans to Return to Present Housing: (P) Yes  Other Identified Issues/Barriers to RETURNING to current housing: yes  Potential Assistance needed at discharge: (P) N/A            Potential DME:    Patient expects to discharge to: (P) [facility] for transportation at discharge: (P) Family    Financial    Payor: [address] / Plan: [address] / Product Type: *No Product type* /     Does insurance require precert for SNF: Yes    Potential assistance Purchasing Medications: (P) No  Meds-to-Beds request: Yes      [facility] [address] [facility], [address] [facility]  [address] [facility] [address]  Phone: [phone] Fax: [phone]    [facility] [id number] [phone] - [phone]  [facility] [address]  Phone: [phone] Fax: [phone]      Notes:    Factors facilitating achievement of predicted outcomes: Family support    Barriers to discharge: none noted    Additional Case Management Notes: none notes    The Plan for Transition of Care is related to the following treatment goals of Impacted gallstone of gallbladder [K80.20]  Gallstone pancreatitis [K85.10]  Calculus of gallbladder with biliary obstruction but without cholecystitis [I56.29]    IF APPLICABLE: The Patient and/or patient representative Karin Berg and his family were provided with a choice of provider and agrees with the discharge plan. Freedom of choice list with basic dialogue that supports the patient's individualized plan of care/goals and shares the quality data associated with the providers was provided to: (P)  (NA)   Patient Representative Name:       The Patient and/or Patient Representative Agree with the Discharge Plan?       Rupert Lombard, RN  Case Management Department  Ph: [phone]

## 2022-12-09 NOTE — H&P
History and Physical [facility]    [facility] RENAL//MED SURG     CHIEF COMPLAINT: Abdominal Pain and Back Pain      Patient Status:       HISTORY OF PRESENT ILLNESS:    From the ER this patient is a 71 y.o. male whopresents with  Chief Complaint   Patient presents with    Abdominal Pain    Back Pain   . HPI  71 y.o. male who presents today for evaluation of abdominal pain. History of G-tube for dysphagia. Recently removed a few months ago. Follows with GI over at [address] [place] [address]. Patient reports he had chronic diarrhea for the last few months. He is on Mestinon for his myasthenia gravis. He denies difficulty swallowing at this time. He does report abrupt onset right-sided abdominal pain. No associated nausea or vomiting. No bladder changes. No fevers or chills. Nothing attempted for relief came straight to the emergency department. Cachorro Bowers MD)    Past Medical History:   Diagnosis Date    Arthritis     Chronic kidney disease     Dysmetabolic syndrome     Hypertension     Iron deficiency anemia, unspecified     Movement disorder     Myasthenia gravis ([address]     Proteinuria     Seizures ([address]        Past Surgical History:   Procedure Laterality Date    KNEE SURGERY      QUADRACEPS TENDON REPAIR       Prior to Admission medications    Medication Sig Start Date End Date Taking?  Authorizing Provider   vitamin D (ERGOCALCIFEROL) 1.25 MG (93822 UT) CAPS capsule Take 1 capsule by mouth once a week for 8 doses 5/24/22 7/13/22  Terrilee Spillers, DO   acetaminophen (TYLENOL) 500 MG tablet Take 2 tablets by mouth every 8 hours 5/24/22   Terrilee Spillers, DO   metoprolol succinate (TOPROL XL) 50 MG extended release tablet Take 50 mg by mouth daily    Historical Provider, MD   aspirin 81 MG chewable tablet Take 81 mg by mouth daily    Historical Provider, MD   pantoprazole (PROTONIX) 40 MG tablet Take 40 mg by mouth daily    Historical Provider, MD   pyridostigmine (MESTINON) 60 MG tablet Take 60 mg by mouth 5 times daily    Historical Provider, MD   ergocalciferol (DRISDOL) 75353 UNITS capsule Take 1 capsule by mouth once a week. Patient not taking: Reported on 12/9/2022 8/12/13   Keegan Saenz MD   amLODIPine (NORVASC) 5 MG tablet Take 1 tablet by mouth daily for 14 days. Patient taking differently: Take 5 mg by mouth in the morning and 5 mg in the evening. 8/12/13 8/26/13  Keegan Saenz MD   divalproex (DEPAKOTE) 125 MG DR tablet Take 125 mg by mouth 2 times daily   Patient not taking: Reported on 12/9/2022    Historical Provider, MD   metformin (GLUCOPHAGE) 500 MG tablet Take 500 mg by mouth daily (with breakfast). Historical Provider, MD   therapeutic multivitamin-minerals (THERAGRAN-M) tablet Take 1 tablet by mouth daily. Historical Provider, MD     Allergies   Allergen Reactions    Pcn [Penicillins]     Sulfa Antibiotics        Social History     Socioeconomic History    Marital status:      Spouse name: Not on file    Number of children: Not on file    Years of education: Not on file    Highest education level: Not on file   Occupational History    Not on file   Tobacco Use    Smoking status: Former     Packs/day: 1.00     Types: Cigarettes    Smokeless tobacco: Never   Substance and Sexual Activity    Alcohol use: No    Drug use: No    Sexual activity: Not on file   Other Topics Concern    Not on file   Social History Narrative    Not on file     Social Determinants of Health     Financial Resource Strain: Not on file   Food Insecurity: Not on file   Transportation Needs: Not on file   Physical Activity: Not on file   Stress: Not on file   Social Connections: Not on file   Intimate Partner Violence: Not on file   Housing Stability: Not on file       No family history on file. REVIEW OF SYSTEMS:  Review of Systems  Constitutional:  Negative for activity change, chills and fever. HENT:  Negative for congestion, sinus pain and sore throat. Eyes:  Negative for pain and visual disturbance. Respiratory:  Negative for cough and shortness of breath. Cardiovascular:  Negative for chest pain. Gastrointestinal:  Positive for abdominal pain and diarrhea. Negative for nausea and vomiting. Genitourinary:  Negative for difficulty urinating, dysuria and hematuria. Musculoskeletal:  Positive for back pain. Negative for myalgias. Skin:  Negative for rash and wound. Neurological:  Negative for dizziness, light-headedness and headaches. Psychiatric/Behavioral:  Negative for agitation and confusion. Meg Hager MD)    PHYSICAL EXAM:    BP (!) 150/89   Pulse (!) 105   Temp 98.8 °F (37.1 °C) (Oral)   Resp 16   Wt 165 lb (74.8 kg)   SpO2 97%   BMI 24.37 kg/m²     Physical Exam  Constitutional:       Appearance: He is well-developed. HENT:      Head: Normocephalic and atraumatic. Right Ear: External ear normal.      Left Ear: External ear normal.      Nose: Nose normal.   Eyes:      Conjunctiva/sclera: Conjunctivae normal.      Pupils: Pupils are equal, round, and reactive to light. Cardiovascular:      Rate and Rhythm: Normal rate and regular rhythm. Heart sounds: Normal heart sounds. No murmur heard. No friction rub. No gallop. Pulmonary:      Effort: Pulmonary effort is normal. No respiratory distress. Breath sounds: No wheezing or rales. Chest:      Chest wall: No tenderness. Abdominal:      General: Bowel sounds are normal. There is no distension. Palpations: Abdomen is soft. There is no mass. Tenderness: There is abdominal tenderness. There is no guarding or rebound. Comments: Right upper quadrant pain. Musculoskeletal:         General: No tenderness. Normal range of motion. Cervical back: Normal range of motion and neck supple. Skin:     General: Skin is warm and dry. Findings: No rash. Neurological:      Mental Status: He is alert and oriented to person, place, and time. Cranial Nerves: No cranial nerve deficit. Motor: No abnormal muscle tone. Coordination: Coordination normal.      Deep Tendon Reflexes: Reflexes are normal and symmetric. Reflexes normal.   Psychiatric:         Behavior: Behavior normal.         Thought Content: Thought content normal. Thought content does not include homicidal or suicidal ideation. Judgment: Judgment normal.       Labs:        Lab Results   Component Value Date/Time    WBC 11.4 (H) 12/08/2022 11:04 PM    HGB 14.6 12/08/2022 11:04 PM    HCT 45.2 12/08/2022 11:04 PM     12/08/2022 11:04 PM    NEUTROABS 8.99 (H) 12/08/2022 11:04 PM     Lab Results   Component Value Date/Time     12/08/2022 11:04 PM    K 4.6 12/08/2022 11:04 PM     12/08/2022 11:04 PM    CREATININE 1.70 (H) 12/08/2022 11:04 PM    BUN 20 12/08/2022 11:04 PM    GLUCOSE 121 (H) 12/08/2022 11:04 PM    GLUCOSE 107 02/28/2022 03:42 PM     Lab Results   Component Value Date/Time    LIPASE 75 (H) 12/08/2022 11:04 PM    ALT 21 12/08/2022 11:04 PM    AST 24 12/08/2022 11:04 PM     Lab Results   Component Value Date/Time    PROTIME 10.6 05/23/2022 04:46 PM    INR 1.0 05/23/2022 04:46 PM    APTT 24.3 05/23/2022 04:46 PM     Lab Results   Component Value Date/Time    COLORU Yellow 12/08/2022 12:58 AM    SPECGRAV 1.009 12/08/2022 12:58 AM    KETUA NEGATIVE 12/08/2022 12:58 AM    HGBUR NEGATIVE 12/08/2022 12:58 AM    NITRU NEGATIVE 12/08/2022 12:58 AM    LEUKOCYTESUR NEGATIVE 12/08/2022 12:58 AM    GLUCOSEU NEGATIVE 12/08/2022 12:58 AM       No results found for: PROBNP  Lab Results   Component Value Date/Time    CRP 4.0 01/31/2022 10:13 AM     No results found for: PROCAL    Rad:    CT ABDOMEN PELVIS W IV CONTRAST Additional Contrast? None    Result Date: 12/9/2022  EXAMINATION: CT OF THE ABDOMEN AND PELVIS WITH CONTRAST 12/9/2022 12:03 am TECHNIQUE: CT of the abdomen and pelvis was performed with the administration of intravenous contrast. Multiplanar reformatted images are provided for review.  Automated exposure control, iterative reconstruction, and/or weight based adjustment of the mA/kV was utilized to reduce the radiation dose to as low as reasonably achievable. COMPARISON: 05/23/2022 HISTORY: ORDERING SYSTEM PROVIDED HISTORY: acute onset abdomen and back pain TECHNOLOGIST PROVIDED HISTORY: acute onset abdomen and back pain Decision Support Exception - unselect if not a suspected or confirmed emergency medical condition->Emergency Medical Condition (MA) Reason for Exam: acute onset abdomen and back pain FINDINGS: Lower Chest: Lung bases are clear. Mild dependent atelectasis. The heart size is normal.  Tiny hiatal hernia. Organs: There is 14 mm gallstone in the region of the gallbladder neck (axial image 54, coronal image 44). This faintly rim calcified gallstone was previous located in the body the gallbladder on the study of 05/23/2022. The gallbladder is moderately dilated. No pericholecystic fluid. There is minimal dilatation of intrahepatic bile ducts. There is a small periampullary duodenal diverticulum. The liver, spleen and pancreas are grossly normal.  There are bilateral renal cysts measuring up to 9.5 cm in the right kidney. There is mild nodular hypertrophy of bilateral adrenal glands, left greater than right, unchanged. GI/Bowel: Few scattered sigmoid colon diverticula. No evidence of diverticulitis. No bowel obstruction. The appendix is. Pelvis: Urinary bladder is grossly normal.  There is artifact from a left hip arthroplasty degrading images through the lower pelvis. Mild prostatomegaly. Peritoneum/Retroperitoneum: There is no adenopathy, free air or free fluid. No abdominal aortic aneurysm. Bones/Soft Tissues: There is no acute bone finding. Prominent anterior spondylosis in the lower thoracic and lumbar spine. There is a 14 mm faintly rim calcified gallstone impacted in the gallbladder neck. This gallstone was located in the body of the gallbladder on 05/23/2022.  No other acute finding in the abdomen or pelvis. US ABDOMEN LIMITED Specify organ? GALLBLADDER    Result Date: 12/9/2022  EXAMINATION: RIGHT UPPER QUADRANT ULTRASOUND 12/9/2022 7:35 am COMPARISON: None. HISTORY: ORDERING SYSTEM PROVIDED HISTORY: RUQ Pain TECHNOLOGIST PROVIDED HISTORY: RUQ Pain Specify organ?->GALLBLADDER FINDINGS: Patient body habitus limits the study, as there is increased attenuation of the ultrasound beam. This decreases sensitivity and specificity. LIVER:  No intrahepatic ductal dilatation. No perihepatic fluid BILIARY SYSTEM:  Stones are seen within the gallbladder. No sonographic Gimenez sign. No pericholecystic fluid. Common bile duct measures 9 mm. RIGHT KIDNEY: No hydronephrosis noted. Cysts are seen. PANCREAS:  Visualized portions of the pancreas are unremarkable. OTHER: No evidence of right upper quadrant ascites. Cholelithiasis. No cholecystitis. IMPRESSION:      Active Hospital Problems    Diagnosis Date Noted    Impacted gallstone of gallbladder [K80.20] 12/09/2022     Priority: Medium    Dysmetabolic syndrome [H42.84]     Myasthenia gravis ([facility] [G70.00] 06/07/2013    Benign prostatic hyperplasia [N40.0] 06/07/2013    Seizure disorder ([address] [L79.596] 06/07/2013    Hypertension [I10]     Chronic kidney disease, stage III (moderate) ([address] [N18.30]            PLAN:   1. At this point time we did have general surgery evaluate the patient and they felt that since he was pain-free he could probably be followed up as an outpatient they did not feel that he had acute cholecystitis warranting immediate treatment of his gallbladder with cholecystectomy. We did consult GI GI did not feel that they needed to see the patient at this time they felt that since the patient was having a cholecystectomy that would take care of any common bile duct stone. Or even a cystic duct stone.   We did notify them of surgery's plan of outpatient intervention they still felt from a GI standpoint there was not really much that needed to be done. The patient still nauseated he has something for nausea he still has pain in the right upper quadrant someone observe him for 24 hours decrease his fluids see if he can tolerate his meals. 2.  We restarted his myasthenia gravis medication as well as his antiseizure medication. 3.  We will go ahead and repeat lab work in the a.m. Current orders:  Orders Placed This Encounter   Procedures    CT ABDOMEN PELVIS W IV CONTRAST Additional Contrast? None     Standing Status:   Standing     Number of Occurrences:   1     Order Specific Question:   Reason for exam:     Answer:   acute onset abdomen and back pain     Order Specific Question:   Additional Contrast?     Answer:   None     Order Specific Question:   Decision Support Exception - unselect if not a suspected or confirmed emergency medical condition     Answer:   Emergency Medical Condition (MA) [1]    US ABDOMEN LIMITED Specify organ? GALLBLADDER           Standing Status:   Standing     Number of Occurrences:   1     Order Specific Question:   Reason for exam:     Answer:   RUQ Pain     Order Specific Question:   Specify organ? Answer:   GALLBLADDER    CBC with Diff     Standing Status:   Standing     Number of Occurrences:   1    CMP     Standing Status:   Standing     Number of Occurrences:   1    Lipase     Standing Status:   Standing     Number of Occurrences:   1    Lactic Acid (Select if patient is over 65 to rule out mesenteric ischemia)     Standing Status:   Standing     Number of Occurrences:   1    Urinalysis with Reflex to Culture     Standing Status:   Standing     Number of Occurrences:   1    Microscopic Urinalysis     Standing Status:   Standing     Number of Occurrences:   1    Lactic Acid     Standing Status:   Standing     Number of Occurrences:   1    ADULT DIET;  Regular     Standing Status:   Standing     Number of Occurrences:   1     Order Specific Question:   Primary Diet     Answer: Regular    Vital signs per unit routine     Standing Status:   Standing     Number of Occurrences:   1    Notify physician     Notify physician for pulse less than 50 or greater than 120, respiratory rate less than 12 or greater than 25, oral temperature greater than 101.3 F (38.5 C) , urinary output less than 120 mL in four hours, systolic BP less than 90 or greater than 183, diastolic BP less than 50 or greater than 100. Standing Status:   Standing     Number of Occurrences:   1    Notify physician     Upon patient's arrival to the unit, please contact the admitting provider for completion of medication reconciliation and comprehensive orders. Standing Status:   Standing     Number of Occurrences:   1    Strict Bedrest     Standing Status:   Standing     Number of Occurrences:   1    Place sequential compression device     Standing Status:   Standing     Number of Occurrences:   1    Full Code     Standing Status:   Standing     Number of Occurrences:   1    Inpatient consult to General Surgery     Standing Status:   Standing     Number of Occurrences:   1     Order Specific Question:   Reason for Consult? Answer:   impacted gallbladder    Inpatient consult to GI     Please recall them to come see. Standing Status:   Standing     Number of Occurrences:   1     Order Specific Question:   Reason for Consult? Answer:   Impacted stone of GB     Order Specific Question:   Provider Contacted?      Answer:   Yes    TYPE AND SCREEN     Specimen is valid for 3 days - nurse to verify valid specimen     Standing Status:   Standing     Number of Occurrences:   1    Saline lock IV     Standing Status:   Standing     Number of Occurrences:   1    ADMIT TO INPATIENT     Standing Status:   Standing     Number of Occurrences:   1     Order Specific Question:   Discharge Plan:     Answer:   Home with Office Follow-up    ADMIT TO INPATIENT     Standing Status:   Standing     Number of Occurrences:   1     Order Specific Question:   Discharge Plan:     Answer:   Home with Office Follow-up       Scheduled Meds:   sodium chloride flush  5-40 mL IntraVENous 2 times per day    [Held by provider] enoxaparin  40 mg SubCUTAneous Daily    pyridostigmine  60 mg Oral 5x Daily    sodium chloride flush  3 mL IntraVENous Q8H       Continuous Infusions:   sodium chloride      sodium chloride 50 mL/hr at 12/09/22 1433       PRN Meds:  sodium chloride flush, sodium chloride, acetaminophen, ondansetron **OR** ondansetron     Isaiah Acosta DO  on 12/9/2022 at  2:36 PM EST     This note was created with the assistance of a speech-recognition program. Although the intention is to generate a document that actually reflects the content of the visit, no guarantees can be provided that every mistake has been identified and corrected by editing.

## 2022-12-09 NOTE — ED NOTES
..Advance Care Planning     Advance Care Planning Activator (Inpatient)  Conversation Note      Date of ACP Conversation: 12/9/2022     Conversation Conducted with: Patient with Decision Making Capacity    ACP Activator: Kelley Cochran, [address] [facility] Decision Maker:  Tari Gonzalez -Daughter [phone]    Current Designated Health Care Decision Maker: Self, If unable- Daughter    Click here to complete Healthcare Decision Makers including section of the Healthcare Decision Maker Relationship (ie \"Primary\")  Today we documented Decision Maker(s) consistent with Legal Next of Kin hierarchy. Care Preferences    Ventilation: \"If you were in your present state of health and suddenly became very ill and were unable to breathe on your own, what would your preference be about the use of a ventilator (breathing machine) if it were available to you? \"      Would the patient desire the use of ventilator (breathing machine)?: yes    \"If your health worsens and it becomes clear that your chance of recovery is unlikely, what would your preference be about the use of a ventilator (breathing machine) if it were available to you? \"     Would the patient desire the use of ventilator (breathing machine)?: Yes      Resuscitation  \"CPR works best to restart the heart when there is a sudden event, like a heart attack, in someone who is otherwise healthy. Unfortunately, CPR does not typically restart the heart for people who have serious health conditions or who are very sick. \"    \"In the event your heart stopped as a result of an underlying serious health condition, would you want attempts to be made to restart your heart (answer \"yes\" for attempt to resuscitate) or would you prefer a natural death (answer \"no\" for do not attempt to resuscitate)? \" yes       [] Yes   [] No   Educated Patient / Antonio Calero regarding differences between Advance Directives and portable DNR orders.     Length of ACP Conversation in minutes: 5    Conversation Outcomes:  [x] ACP discussion completed  [] Existing advance directive reviewed with patient; no changes to patient's previously recorded wishes  [] New Advance Directive completed  [] Portable Do Not Rescitate prepared for Provider review and signature  [] POLST/POST/MOLST/MOST prepared for Provider review and signature      Follow-up plan:    [] Schedule follow-up conversation to continue planning  [x] Referred individual to Provider for additional questions/concerns   [] Advised patient/agent/surrogate to review completed ACP document and update if needed with changes in condition, patient preferences or care setting    [] This note routed to one or more involved healthcare providers                   Silvia Ingram  12/09/22 1931

## 2022-12-09 NOTE — ED PROVIDER NOTES
Faculty Sign-Out Attestation  Handoff taken on the following patient from prior Attending Physician: Rashaad Garrison    I was available and discussed any additional care issues that arose and coordinated the management plans with the resident(s) caring for the patient during my duty period. Any areas of disagreement with residents documentation of care or procedures are noted on the chart. I was personally present for the key portions of any/all procedures during my duty period. I have documented in the chart those procedures where I was not present during the key portions.     Abdominal pain, ct pending, +/-   Pt must improve with stable results    Aliza Nair,   Attending Physician       Aliza Nair, DO  12/09/22 0007       Aliza Nair, DO  12/09/22 0008    Impacted gall stone / Dr Giuseppe Vargas admitting  Surgery consulted     Aliza Nair, DO  12/09/22 [address] [facility],[address], DO  12/09/22 [id number]

## 2022-12-09 NOTE — ED PROVIDER NOTES
[facility] ED  Emergency Department Encounter  EmergencyMedicineResident       Pt Name: Yvon Gomez  MRN: [id number]  [place] 1950  Date of evaluation: 12/8/22  PCP: Polo Zhu DO    CHIEF COMPLAINT       Chief Complaint   Patient presents with    Abdominal Pain    Back Pain       HISTORY OF PRESENT ILLNESS  (Location/Symptom, Timing/Onset, Context/Setting, Quality, Duration, Modifying Factors, Severity.)      Yvon Gomez is a 71 y.o. male who presents today for evaluation of abdominal pain. History of G-tube for dysphagia. Recently removed a few months ago. Follows with GI over at [facility]. Patient reports he had chronic diarrhea for the last few months. He is on Mestinon for his myasthenia gravis. He denies difficulty swallowing at this time. He does report abrupt onset right-sided abdominal pain. No associated nausea or vomiting. No bladder changes. No fevers or chills. Nothing attempted for relief came straight to the emergency department. PAST MEDICAL / SURGICAL /SOCIAL / FAMILY HISTORY      has a past medical history of Arthritis, Chronic kidney disease, Dysmetabolic syndrome, Hypertension, Iron deficiency anemia, unspecified, Movement disorder, Myasthenia gravis ([address] Proteinuria, and Seizures ([facility] [address] has a past surgical history that includes Quadraceps tendon repair and knee surgery.       Social History     Socioeconomic History    Marital status:      Spouse name: Not on file    Number of children: Not on file    Years of education: Not on file    Highest education level: Not on file   Occupational History    Not on file   Tobacco Use    Smoking status: Former     Packs/day: 1.00     Types: Cigarettes    Smokeless tobacco: Never   Substance and Sexual Activity    Alcohol use: No    Drug use: No    Sexual activity: Not on file   Other Topics Concern    Not on file   Social History Narrative    Not on file     Social Determinants of Health Financial Resource Strain: Not on file   Food Insecurity: Not on file   Transportation Needs: Not on file   Physical Activity: Not on file   Stress: Not on file   Social Connections: Not on file   Intimate Partner Violence: Not on file   Housing Stability: Not on file       No family history on file. Allergies:  Pcn [penicillins] and Sulfa antibiotics    Home Medications:  Prior to Admission medications    Medication Sig Start Date End Date Taking? Authorizing Provider   vitamin D (ERGOCALCIFEROL) 1.25 MG (75678 UT) CAPS capsule Take 1 capsule by mouth once a week for 8 doses 5/24/22 7/13/22  Cynthia King,    acetaminophen (TYLENOL) 500 MG tablet Take 2 tablets by mouth every 8 hours 5/24/22   Cynthia King, DO   metoprolol succinate (TOPROL XL) 50 MG extended release tablet Take 50 mg by mouth daily    Historical Provider, MD   aspirin 81 MG chewable tablet Take 81 mg by mouth daily    Historical Provider, MD   pantoprazole (PROTONIX) 40 MG tablet Take 40 mg by mouth daily    Historical Provider, MD   pyridostigmine (MESTINON) 60 MG tablet Take 60 mg by mouth 5 times daily    Historical Provider, MD   ergocalciferol (DRISDOL) 79098 UNITS capsule Take 1 capsule by mouth once a week. 8/12/13   Pal Mccarthy MD   amLODIPine (NORVASC) 5 MG tablet Take 1 tablet by mouth daily for 14 days. 8/12/13 8/26/13  Janina Zarate MD   divalproex (DEPAKOTE) 125 MG DR tablet Take 125 mg by mouth 2 times daily     Historical Provider, MD   metformin (GLUCOPHAGE) 500 MG tablet Take 500 mg by mouth daily (with breakfast). Historical Provider, MD   therapeutic multivitamin-minerals (THERAGRAN-M) tablet Take 1 tablet by mouth daily. Historical Provider, MD       REVIEW OF SYSTEMS    (2-9 systems for level 4, 10 or more forlevel 5)      Review of Systems   Constitutional:  Negative for activity change, chills and fever. HENT:  Negative for congestion, sinus pain and sore throat.     Eyes:  Negative for pain and visual disturbance. Respiratory:  Negative for cough and shortness of breath. Cardiovascular:  Negative for chest pain. Gastrointestinal:  Positive for abdominal pain and diarrhea. Negative for nausea and vomiting. Genitourinary:  Negative for difficulty urinating, dysuria and hematuria. Musculoskeletal:  Positive for back pain. Negative for myalgias. Skin:  Negative for rash and wound. Neurological:  Negative for dizziness, light-headedness and headaches. Psychiatric/Behavioral:  Negative for agitation and confusion. PHYSICAL EXAM   (up to 7 for level 4, 8 or more forlevel 5)      ED TRIAGE VITALS BP: (!) 175/85, Temp: 97.5 °F (36.4 °C), Heart Rate: 86, Resp: 16, SpO2: 98 %    Vitals:    12/09/22 0127 12/09/22 0142 12/09/22 0157 12/09/22 0212   BP: (!) 161/79 (!) 161/78 (!) 166/96 (!) 167/80   Pulse: 99 99 (!) 111 100   Resp:   26 22   Temp:       TempSrc:       SpO2:       Weight:             Physical Exam  Vitals and nursing note reviewed. Constitutional:       Appearance: Normal appearance. HENT:      Head: Normocephalic and atraumatic. Nose: Nose normal.      Mouth/Throat:      Mouth: Mucous membranes are moist.   Eyes:      Extraocular Movements: Extraocular movements intact. Pupils: Pupils are equal, round, and reactive to light. Cardiovascular:      Rate and Rhythm: Normal rate and regular rhythm. Pulses: Normal pulses. Heart sounds: Normal heart sounds. Pulmonary:      Effort: Pulmonary effort is normal. No respiratory distress. Breath sounds: Normal breath sounds. Abdominal:      General: Abdomen is flat. There is no distension. Palpations: Abdomen is soft. There is no mass. Tenderness: There is abdominal tenderness. There is no guarding or rebound. Comments: Normal bowel sounds   Musculoskeletal:         General: Normal range of motion. Cervical back: Normal range of motion. Skin:     General: Skin is warm and dry.       Capillary Refill: Capillary refill takes less than 2 seconds. Neurological:      General: No focal deficit present. Mental Status: He is alert and oriented to person, place, and time. Psychiatric:         Mood and Affect: Mood normal.         Behavior: Behavior normal.         DIFFERENTIAL  DIAGNOSIS     PLAN (LABS / IMAGING / EKG):  Orders Placed This Encounter   Procedures    CT ABDOMEN PELVIS W IV CONTRAST Additional Contrast? None    CBC with Diff    CMP    Lipase    Lactic Acid (Select if patient is over 65 to rule out mesenteric ischemia)    Urinalysis with Reflex to Culture    Microscopic Urinalysis    Lactic Acid    Diet NPO    Inpatient consult to General Surgery    Inpatient consult to GI    Saline lock IV    ADMIT TO INPATIENT       MEDICATIONS ORDERED:  ED Medication Orders (From admission, onward)      Start Ordered     Status Ordering Provider    12/09/22 0045 12/09/22 0037  0.9 % sodium chloride bolus  ONCE         Last MAR action: New Bag - by Stephen Verdin on 12/09/22 at [address] [facility]    12/09/22 0002 12/09/22 0003  iopamidol (ISOVUE-370) 76 % injection 75 mL  IMG ONCE PRN         Last MAR action: Given - by Courtney Patel on 12/09/22 at [address] [place] [address]    12/08/22 2245 12/08/22 2233  sodium chloride flush 0.9 % injection 3 mL  EVERY 8 HOURS        See [place] for full Linked Orders Report.     Last MAR action: Given - by KARL SOLITARIO on 12/08/22 at [address] [facility]    12/08/22 2245 12/08/22 2233  0.9 % sodium chloride bolus  ONCE         Last MAR action: Stopped - by Stephen Verdin on 12/08/22 at [address] [facility]    12/08/22 2245 12/08/22 2233  morphine injection 4 mg  ONCE         Last MAR action: Given - by KARL SOLITARIO on 12/08/22 at [place], [place]    12/08/22 2245 12/08/22 2233  ondansetron (ZOFRAN) injection 4 mg  ONCE         Last MAR action: Given - by KARL SOLITARIO on 12/08/22 at [place], [place] [address]    12/08/22 2245 12/08/22 2233  famotidine (PEPCID) injection 20 mg  ONCE         Last MAR action: Given - by KARL SOLITARIO on 12/08/22 at [id number] / [facility]     IMPRESSION & INITIAL PLAN:  This is a 29-year-old male presenting to the Emergency Department today for evaluation of right-sided abdominal pain and back pain. Acute onset 2 to 3 hours prior. Patient has known history of gallstones. Today his work-up did show that he had elevated lipase and lactic acid. CT imaging of the abdomen revealed that he had a 14 mm gallstone impacted no gallstone in the neck. Patient was fluid resuscitated aggressively, provided pain control and symptom control. Will be admitted for GI/general surgery services. Discussed the case with the patient's primary care physician who will be admitting primary. He does request that we reach out to GI and general surgery tonight. Will admit to [facility]. Bridging orders placed.     LABS:  Results for orders placed or performed during the hospital encounter of 12/08/22   CBC with Diff   Result Value Ref Range    WBC 11.4 (H) 3.5 - 11.3 k/uL    RBC 5.21 4.21 - 5.77 m/uL    Hemoglobin 14.6 13.0 - 17.0 g/dL    Hematocrit 45.2 40.7 - 50.3 %    MCV 86.8 82.6 - 102.9 fL    MCH 28.0 25.2 - 33.5 pg    MCHC 32.3 28.4 - 34.8 g/dL    RDW 14.1 11.8 - 14.4 %    Platelets 188 327 - 563 k/uL    MPV 11.0 8.1 - 13.5 fL    NRBC Automated 0.0 0.0 per 100 WBC    Seg Neutrophils 78 (H) 36 - 65 %    Lymphocytes 12 (L) 24 - 43 %    Monocytes 6 3 - 12 %    Eosinophils % 2 1 - 4 %    Basophils 1 0 - 2 %    Immature Granulocytes 1 (H) 0 %    Segs Absolute 8.99 (H) 1.50 - 8.10 k/uL    Absolute Lymph # 1.36 1.10 - 3.70 k/uL    Absolute Mono # 0.69 0.10 - 1.20 k/uL    Absolute Eos # 0.20 0.00 - 0.44 k/uL    Basophils Absolute 0.06 0.00 - 0.20 k/uL    Absolute Immature Granulocyte 0.07 0.00 - 0.30 k/uL   CMP   Result Value Ref Range    Glucose 121 (H) 70 - 99 mg/dL    BUN 20 8 - 23 mg/dL Creatinine 1.70 (H) 0.70 - 1.20 mg/dL    Est, Glom Filt Rate 43 (L) >60 mL/min/1.73m2    Calcium 9.4 8.6 - 10.4 mg/dL    Sodium 139 135 - 144 mmol/L    Potassium 4.6 3.7 - 5.3 mmol/L    Chloride 103 98 - 107 mmol/L    CO2 21 20 - 31 mmol/L    Anion Gap 15 9 - 17 mmol/L    Alkaline Phosphatase 133 (H) 40 - 129 U/L    ALT 21 5 - 41 U/L    AST 24 <40 U/L    Total Bilirubin 0.4 0.3 - 1.2 mg/dL    Total Protein 7.6 6.4 - 8.3 g/dL    Albumin 4.5 3.5 - 5.2 g/dL    Albumin/Globulin Ratio 1.5 1.0 - 2.5   Lipase   Result Value Ref Range    Lipase 75 (H) 13 - 60 U/L   Lactic Acid (Select if patient is over 65 to rule out mesenteric ischemia)   Result Value Ref Range    Lactic Acid, Whole Blood 4.1 (H) 0.7 - 2.1 mmol/L   Urinalysis with Reflex to Culture    Specimen: Urine   Result Value Ref Range    Color, UA Yellow Yellow    Turbidity UA Clear Clear    Glucose, Ur NEGATIVE NEGATIVE    Bilirubin Urine NEGATIVE NEGATIVE    Ketones, Urine NEGATIVE NEGATIVE    Specific Gravity, UA 1.009 1.005 - 1.030    Urine Hgb NEGATIVE NEGATIVE    pH, UA 8.5 (H) 5.0 - 8.0    Protein, UA NEGATIVE  Verified by sulfosalicylic acid test. (A) NEGATIVE    Urobilinogen, Urine Normal Normal    Nitrite, Urine NEGATIVE NEGATIVE    Leukocyte Esterase, Urine NEGATIVE NEGATIVE   Microscopic Urinalysis   Result Value Ref Range    WBC, UA None 0 - 5 /HPF    RBC, UA 2 TO 5 0 - 4 /HPF    Epithelial Cells UA None 0 - 5 /HPF       RADIOLOGY:  CT ABDOMEN PELVIS W IV CONTRAST Additional Contrast? None   Final Result   There is a 14 mm faintly rim calcified gallstone impacted in the gallbladder   neck. This gallstone was located in the body of the gallbladder on   05/23/2022. No other acute finding in the abdomen or pelvis.              ED Course as of 12/09/22 0221   Thu Dec 08, 2022   2326 WBC(!): 11.4 [TJ]   2341 Lipase(!): 75 [TJ]   Fri Dec 09, 2022   0158 Lactic Acid, Whole Blood(!): 4.1 [TJ]      ED Course User Index  [TJ] Herminia Mccarthy MD CONSULTS:  IP CONSULT TO GENERAL SURGERY  IP CONSULT TO GI    CRITICAL CARE:  See attending physician note    FINAL IMPRESSION      1. Gallstone pancreatitis    2. Calculus of gallbladder with biliary obstruction but without cholecystitis          DISPOSITION / PLAN     DISPOSITION Admitted 12/09/2022 02:09:41 AM    PATIENT REFERRED TO:  No follow-up provider specified.     DISCHARGE MEDICATIONS:  New Prescriptions    No medications on file     Modified Medications    No medications on file        Yolanda Davey MD  Emergency Medicine Resident    (Please note that portions of this note were completed with a voice recognition program.  Efforts were made to edit the dictations but occasionally words are mis-transcribed.)       Yolanda Davey MD  Resident  12/09/22 3192

## 2022-12-09 NOTE — ED PROVIDER NOTES
[address] [facility]     Emergency Department     Faculty Attestation        I performed a history and physical examination of the patient and discussed management with the resident. I reviewed the residents note and agree with the documented findings and plan of care. Any areas of disagreement are noted on the chart. I was personally present for the key portions of any procedures. I have documented in the chart those procedures where I was not present during the key portions. I have reviewed the emergency nurses triage note. I agree with the chief complaint, past medical history, past surgical history, allergies, medications, social and family history as documented unless otherwise noted below. For Physician Assistant/ Nurse Practitioner cases/documentation I have personally evaluated this patient and have completed at least one if not all key elements of the E/M (history, physical exam, and MDM). Additional findings are as noted. Vital Signs:    Heart Rate: 86  Resp: 16  Temp: 97.5 °F (36.4 °C) SpO2: 98 %  PCP:  Krista Wells DO    Pertinent Comments:     Is a 67-year-old male with history of being hard of hearing as well as chronic kidney disease and myasthenia gravis with previous dysmotility and seeing GI. Did have a G-tube for feeding until this was taken out a few months ago and has been doing relatively well. Patient began having lower abdominal pain over the last day or so and associated with some diarrhea and black stool but is been taking Pepto-Bismol. Pain extends into the upper abdomen as well and wraps around to the back. There is moderate tenderness when palpated however no obvious bruising seen on flanks or periumbilically. Assessment/plan: Patient with relatively recent G-tube removal and now having significant abdominal discomfort and diarrhea.    Will obtain basic laboratories as well as attempt symptomatic control and

## 2022-12-10 ENCOUNTER — ANESTHESIA EVENT (OUTPATIENT)
Dept: OPERATING ROOM | Age: 72
End: 2022-12-10
Payer: COMMERCIAL

## 2022-12-10 ENCOUNTER — ANESTHESIA (OUTPATIENT)
Dept: OPERATING ROOM | Age: 72
End: 2022-12-10
Payer: COMMERCIAL

## 2022-12-10 PROBLEM — K85.10 GALLSTONE PANCREATITIS: Status: ACTIVE | Noted: 2022-12-10

## 2022-12-10 LAB
ABSOLUTE EOS #: 0 K/UL (ref 0–0.4)
ABSOLUTE EOS #: 0 K/UL (ref 0–0.4)
ABSOLUTE IMMATURE GRANULOCYTE: 0 K/UL (ref 0–0.3)
ABSOLUTE IMMATURE GRANULOCYTE: 0 K/UL (ref 0–0.3)
ABSOLUTE LYMPH #: 0.77 K/UL (ref 1–4.8)
ABSOLUTE LYMPH #: 1.88 K/UL (ref 1–4.8)
ABSOLUTE MONO #: 1.53 K/UL (ref 0.1–0.8)
ABSOLUTE MONO #: 1.61 K/UL (ref 0.1–0.8)
ALBUMIN SERPL-MCNC: 3.2 G/DL (ref 3.5–5.2)
ALBUMIN SERPL-MCNC: 3.8 G/DL (ref 3.5–5.2)
ALBUMIN/GLOBULIN RATIO: 1.1 (ref 1–2.5)
ALBUMIN/GLOBULIN RATIO: 1.3 (ref 1–2.5)
ALP BLD-CCNC: 104 U/L (ref 40–129)
ALP BLD-CCNC: 87 U/L (ref 40–129)
ALT SERPL-CCNC: 23 U/L (ref 5–41)
ALT SERPL-CCNC: 58 U/L (ref 5–41)
ANION GAP SERPL CALCULATED.3IONS-SCNC: 12 MMOL/L (ref 9–17)
ANION GAP SERPL CALCULATED.3IONS-SCNC: 17 MMOL/L (ref 9–17)
AST SERPL-CCNC: 20 U/L
AST SERPL-CCNC: 74 U/L
BASOPHILS # BLD: 0 % (ref 0–2)
BASOPHILS # BLD: 0 % (ref 0–2)
BASOPHILS ABSOLUTE: 0 K/UL (ref 0–0.2)
BASOPHILS ABSOLUTE: 0 K/UL (ref 0–0.2)
BILIRUB SERPL-MCNC: 1.1 MG/DL (ref 0.3–1.2)
BILIRUB SERPL-MCNC: 1.5 MG/DL (ref 0.3–1.2)
BUN BLDV-MCNC: 20 MG/DL (ref 8–23)
BUN BLDV-MCNC: 24 MG/DL (ref 8–23)
C-REACTIVE PROTEIN: 208.5 MG/L (ref 0–5)
CALCIUM SERPL-MCNC: 8.6 MG/DL (ref 8.6–10.4)
CALCIUM SERPL-MCNC: 8.9 MG/DL (ref 8.6–10.4)
CHLORIDE BLD-SCNC: 103 MMOL/L (ref 98–107)
CHLORIDE BLD-SCNC: 106 MMOL/L (ref 98–107)
CO2: 18 MMOL/L (ref 20–31)
CO2: 21 MMOL/L (ref 20–31)
CREAT SERPL-MCNC: 1.61 MG/DL (ref 0.7–1.2)
CREAT SERPL-MCNC: 1.95 MG/DL (ref 0.7–1.2)
EKG ATRIAL RATE: 109 BPM
EKG P AXIS: 69 DEGREES
EKG P-R INTERVAL: 164 MS
EKG Q-T INTERVAL: 372 MS
EKG QRS DURATION: 126 MS
EKG QTC CALCULATION (BAZETT): 500 MS
EKG R AXIS: -43 DEGREES
EKG T AXIS: 34 DEGREES
EKG VENTRICULAR RATE: 109 BPM
EOSINOPHILS RELATIVE PERCENT: 0 % (ref 1–4)
EOSINOPHILS RELATIVE PERCENT: 0 % (ref 1–4)
GFR SERPL CREATININE-BSD FRML MDRD: 36 ML/MIN/1.73M2
GFR SERPL CREATININE-BSD FRML MDRD: 45 ML/MIN/1.73M2
GLUCOSE BLD-MCNC: 126 MG/DL (ref 75–110)
GLUCOSE BLD-MCNC: 131 MG/DL (ref 70–99)
GLUCOSE BLD-MCNC: 135 MG/DL (ref 70–99)
GLUCOSE BLD-MCNC: 167 MG/DL (ref 75–110)
HCT VFR BLD CALC: 40.6 % (ref 40.7–50.3)
HCT VFR BLD CALC: 44 % (ref 40.7–50.3)
HEMOGLOBIN: 12.8 G/DL (ref 13–17)
HEMOGLOBIN: 13.7 G/DL (ref 13–17)
IMMATURE GRANULOCYTES: 0 %
IMMATURE GRANULOCYTES: 0 %
LYMPHOCYTES # BLD: 3 % (ref 24–44)
LYMPHOCYTES # BLD: 7 % (ref 24–44)
MAGNESIUM: 1.9 MG/DL (ref 1.6–2.6)
MCH RBC QN AUTO: 27.8 PG (ref 25.2–33.5)
MCH RBC QN AUTO: 28 PG (ref 25.2–33.5)
MCHC RBC AUTO-ENTMCNC: 31.1 G/DL (ref 28.4–34.8)
MCHC RBC AUTO-ENTMCNC: 31.5 G/DL (ref 28.4–34.8)
MCV RBC AUTO: 88.8 FL (ref 82.6–102.9)
MCV RBC AUTO: 89.2 FL (ref 82.6–102.9)
MONOCYTES # BLD: 6 % (ref 1–7)
MONOCYTES # BLD: 6 % (ref 1–7)
MORPHOLOGY: NORMAL
MORPHOLOGY: NORMAL
NRBC AUTOMATED: 0 PER 100 WBC
NRBC AUTOMATED: 0 PER 100 WBC
PDW BLD-RTO: 14 % (ref 11.8–14.4)
PDW BLD-RTO: 14.3 % (ref 11.8–14.4)
PLATELET # BLD: 199 K/UL (ref 138–453)
PLATELET # BLD: 227 K/UL (ref 138–453)
PMV BLD AUTO: 11 FL (ref 8.1–13.5)
PMV BLD AUTO: 11.1 FL (ref 8.1–13.5)
POTASSIUM SERPL-SCNC: 4.6 MMOL/L (ref 3.7–5.3)
POTASSIUM SERPL-SCNC: 4.6 MMOL/L (ref 3.7–5.3)
RBC # BLD: 4.57 M/UL (ref 4.21–5.77)
RBC # BLD: 4.93 M/UL (ref 4.21–5.77)
SEG NEUTROPHILS: 87 % (ref 36–66)
SEG NEUTROPHILS: 91 % (ref 36–66)
SEGMENTED NEUTROPHILS ABSOLUTE COUNT: 23.2 K/UL (ref 1.8–7.7)
SEGMENTED NEUTROPHILS ABSOLUTE COUNT: 23.31 K/UL (ref 1.8–7.7)
SODIUM BLD-SCNC: 138 MMOL/L (ref 135–144)
SODIUM BLD-SCNC: 139 MMOL/L (ref 135–144)
TOTAL PROTEIN: 6 G/DL (ref 6.4–8.3)
TOTAL PROTEIN: 6.8 G/DL (ref 6.4–8.3)
WBC # BLD: 25.5 K/UL (ref 3.5–11.3)
WBC # BLD: 26.8 K/UL (ref 3.5–11.3)

## 2022-12-10 PROCEDURE — 82947 ASSAY GLUCOSE BLOOD QUANT: CPT

## 2022-12-10 PROCEDURE — 6370000000 HC RX 637 (ALT 250 FOR IP): Performed by: ANESTHESIOLOGY

## 2022-12-10 PROCEDURE — 3600000019 HC SURGERY ROBOT ADDTL 15MIN: Performed by: SURGERY

## 2022-12-10 PROCEDURE — 6360000002 HC RX W HCPCS: Performed by: NURSE ANESTHETIST, CERTIFIED REGISTERED

## 2022-12-10 PROCEDURE — 7100000000 HC PACU RECOVERY - FIRST 15 MIN: Performed by: SURGERY

## 2022-12-10 PROCEDURE — 6370000000 HC RX 637 (ALT 250 FOR IP)

## 2022-12-10 PROCEDURE — 6360000002 HC RX W HCPCS: Performed by: ANESTHESIOLOGY

## 2022-12-10 PROCEDURE — S2900 ROBOTIC SURGICAL SYSTEM: HCPCS | Performed by: SURGERY

## 2022-12-10 PROCEDURE — 7100000001 HC PACU RECOVERY - ADDTL 15 MIN: Performed by: SURGERY

## 2022-12-10 PROCEDURE — 36415 COLL VENOUS BLD VENIPUNCTURE: CPT

## 2022-12-10 PROCEDURE — 2709999900 HC NON-CHARGEABLE SUPPLY: Performed by: SURGERY

## 2022-12-10 PROCEDURE — 2500000003 HC RX 250 WO HCPCS: Performed by: NURSE ANESTHETIST, CERTIFIED REGISTERED

## 2022-12-10 PROCEDURE — 2580000003 HC RX 258: Performed by: SURGERY

## 2022-12-10 PROCEDURE — 1200000000 HC SEMI PRIVATE

## 2022-12-10 PROCEDURE — 3700000000 HC ANESTHESIA ATTENDED CARE: Performed by: SURGERY

## 2022-12-10 PROCEDURE — 8E0W4CZ ROBOTIC ASSISTED PROCEDURE OF TRUNK REGION, PERCUTANEOUS ENDOSCOPIC APPROACH: ICD-10-PCS | Performed by: SURGERY

## 2022-12-10 PROCEDURE — 2580000003 HC RX 258

## 2022-12-10 PROCEDURE — 80053 COMPREHEN METABOLIC PANEL: CPT

## 2022-12-10 PROCEDURE — 88304 TISSUE EXAM BY PATHOLOGIST: CPT

## 2022-12-10 PROCEDURE — 2580000003 HC RX 258: Performed by: STUDENT IN AN ORGANIZED HEALTH CARE EDUCATION/TRAINING PROGRAM

## 2022-12-10 PROCEDURE — 6360000002 HC RX W HCPCS

## 2022-12-10 PROCEDURE — 85025 COMPLETE CBC W/AUTO DIFF WBC: CPT

## 2022-12-10 PROCEDURE — 3600000009 HC SURGERY ROBOT BASE: Performed by: SURGERY

## 2022-12-10 PROCEDURE — 0FT44ZZ RESECTION OF GALLBLADDER, PERCUTANEOUS ENDOSCOPIC APPROACH: ICD-10-PCS | Performed by: SURGERY

## 2022-12-10 PROCEDURE — 6370000000 HC RX 637 (ALT 250 FOR IP): Performed by: STUDENT IN AN ORGANIZED HEALTH CARE EDUCATION/TRAINING PROGRAM

## 2022-12-10 PROCEDURE — 2580000003 HC RX 258: Performed by: ANESTHESIOLOGY

## 2022-12-10 PROCEDURE — 86140 C-REACTIVE PROTEIN: CPT

## 2022-12-10 PROCEDURE — 93005 ELECTROCARDIOGRAM TRACING: CPT | Performed by: ANESTHESIOLOGY

## 2022-12-10 PROCEDURE — 2500000003 HC RX 250 WO HCPCS: Performed by: STUDENT IN AN ORGANIZED HEALTH CARE EDUCATION/TRAINING PROGRAM

## 2022-12-10 PROCEDURE — 2580000003 HC RX 258: Performed by: NURSE ANESTHETIST, CERTIFIED REGISTERED

## 2022-12-10 PROCEDURE — 99222 1ST HOSP IP/OBS MODERATE 55: CPT | Performed by: PSYCHIATRY & NEUROLOGY

## 2022-12-10 PROCEDURE — 83735 ASSAY OF MAGNESIUM: CPT

## 2022-12-10 PROCEDURE — 2720000010 HC SURG SUPPLY STERILE: Performed by: SURGERY

## 2022-12-10 PROCEDURE — 2500000003 HC RX 250 WO HCPCS: Performed by: SURGERY

## 2022-12-10 PROCEDURE — 3700000001 HC ADD 15 MINUTES (ANESTHESIA): Performed by: SURGERY

## 2022-12-10 DEVICE — CLIP INT L POLYMER LOK LIG HEM O LOK: Type: IMPLANTABLE DEVICE | Status: FUNCTIONAL

## 2022-12-10 RX ORDER — ROCURONIUM BROMIDE 10 MG/ML
INJECTION, SOLUTION INTRAVENOUS PRN
Status: DISCONTINUED | OUTPATIENT
Start: 2022-12-10 | End: 2022-12-10 | Stop reason: SDUPTHER

## 2022-12-10 RX ORDER — DIPHENHYDRAMINE HYDROCHLORIDE 50 MG/ML
12.5 INJECTION INTRAMUSCULAR; INTRAVENOUS
Status: DISCONTINUED | OUTPATIENT
Start: 2022-12-10 | End: 2022-12-10 | Stop reason: HOSPADM

## 2022-12-10 RX ORDER — ENOXAPARIN SODIUM 100 MG/ML
40 INJECTION SUBCUTANEOUS DAILY
Status: DISCONTINUED | OUTPATIENT
Start: 2022-12-11 | End: 2022-12-13 | Stop reason: HOSPADM

## 2022-12-10 RX ORDER — CEFAZOLIN SODIUM 1 G/3ML
INJECTION, POWDER, FOR SOLUTION INTRAMUSCULAR; INTRAVENOUS PRN
Status: DISCONTINUED | OUTPATIENT
Start: 2022-12-10 | End: 2022-12-10 | Stop reason: SDUPTHER

## 2022-12-10 RX ORDER — GLYCOPYRROLATE 0.2 MG/ML
INJECTION INTRAMUSCULAR; INTRAVENOUS PRN
Status: DISCONTINUED | OUTPATIENT
Start: 2022-12-10 | End: 2022-12-10 | Stop reason: SDUPTHER

## 2022-12-10 RX ORDER — SODIUM CHLORIDE, SODIUM LACTATE, POTASSIUM CHLORIDE, CALCIUM CHLORIDE 600; 310; 30; 20 MG/100ML; MG/100ML; MG/100ML; MG/100ML
INJECTION, SOLUTION INTRAVENOUS CONTINUOUS PRN
Status: DISCONTINUED | OUTPATIENT
Start: 2022-12-10 | End: 2022-12-10 | Stop reason: SDUPTHER

## 2022-12-10 RX ORDER — INDOCYANINE GREEN AND WATER 25 MG
5 KIT INJECTION ONCE
Status: COMPLETED | OUTPATIENT
Start: 2022-12-10 | End: 2022-12-10

## 2022-12-10 RX ORDER — PHENYLEPHRINE HCL IN 0.9% NACL 0.5 MG/5ML
SYRINGE (ML) INTRAVENOUS PRN
Status: DISCONTINUED | OUTPATIENT
Start: 2022-12-10 | End: 2022-12-10

## 2022-12-10 RX ORDER — PROCHLORPERAZINE EDISYLATE 5 MG/ML
5 INJECTION INTRAMUSCULAR; INTRAVENOUS
Status: DISCONTINUED | OUTPATIENT
Start: 2022-12-10 | End: 2022-12-10 | Stop reason: HOSPADM

## 2022-12-10 RX ORDER — SODIUM CHLORIDE 0.9 % (FLUSH) 0.9 %
5-40 SYRINGE (ML) INJECTION EVERY 12 HOURS SCHEDULED
Status: DISCONTINUED | OUTPATIENT
Start: 2022-12-10 | End: 2022-12-10 | Stop reason: HOSPADM

## 2022-12-10 RX ORDER — ONDANSETRON 2 MG/ML
INJECTION INTRAMUSCULAR; INTRAVENOUS PRN
Status: DISCONTINUED | OUTPATIENT
Start: 2022-12-10 | End: 2022-12-10 | Stop reason: SDUPTHER

## 2022-12-10 RX ORDER — PROPOFOL 10 MG/ML
INJECTION, EMULSION INTRAVENOUS PRN
Status: DISCONTINUED | OUTPATIENT
Start: 2022-12-10 | End: 2022-12-10 | Stop reason: SDUPTHER

## 2022-12-10 RX ORDER — IPRATROPIUM BROMIDE AND ALBUTEROL SULFATE 2.5; .5 MG/3ML; MG/3ML
1 SOLUTION RESPIRATORY (INHALATION)
Status: DISCONTINUED | OUTPATIENT
Start: 2022-12-10 | End: 2022-12-10 | Stop reason: HOSPADM

## 2022-12-10 RX ORDER — SODIUM CHLORIDE 9 MG/ML
INJECTION, SOLUTION INTRAVENOUS PRN
Status: DISCONTINUED | OUTPATIENT
Start: 2022-12-10 | End: 2022-12-10 | Stop reason: HOSPADM

## 2022-12-10 RX ORDER — SODIUM CHLORIDE 0.9 % (FLUSH) 0.9 %
5-40 SYRINGE (ML) INJECTION PRN
Status: DISCONTINUED | OUTPATIENT
Start: 2022-12-10 | End: 2022-12-10 | Stop reason: HOSPADM

## 2022-12-10 RX ORDER — TRAMADOL HYDROCHLORIDE 50 MG/1
50 TABLET ORAL
Status: COMPLETED | OUTPATIENT
Start: 2022-12-10 | End: 2022-12-10

## 2022-12-10 RX ORDER — MAGNESIUM HYDROXIDE 1200 MG/15ML
LIQUID ORAL CONTINUOUS PRN
Status: COMPLETED | OUTPATIENT
Start: 2022-12-10 | End: 2022-12-10

## 2022-12-10 RX ORDER — GABAPENTIN 100 MG/1
100 CAPSULE ORAL 3 TIMES DAILY
Status: DISCONTINUED | OUTPATIENT
Start: 2022-12-10 | End: 2022-12-13 | Stop reason: HOSPADM

## 2022-12-10 RX ORDER — METRONIDAZOLE 500 MG/1
500 TABLET ORAL EVERY 8 HOURS SCHEDULED
Status: DISCONTINUED | OUTPATIENT
Start: 2022-12-10 | End: 2022-12-13 | Stop reason: HOSPADM

## 2022-12-10 RX ORDER — DROPERIDOL 2.5 MG/ML
0.62 INJECTION, SOLUTION INTRAMUSCULAR; INTRAVENOUS
Status: DISCONTINUED | OUTPATIENT
Start: 2022-12-10 | End: 2022-12-10 | Stop reason: HOSPADM

## 2022-12-10 RX ORDER — LABETALOL HYDROCHLORIDE 5 MG/ML
10 INJECTION, SOLUTION INTRAVENOUS
Status: DISCONTINUED | OUTPATIENT
Start: 2022-12-10 | End: 2022-12-10 | Stop reason: HOSPADM

## 2022-12-10 RX ORDER — MIDAZOLAM HYDROCHLORIDE 2 MG/2ML
2 INJECTION, SOLUTION INTRAMUSCULAR; INTRAVENOUS
Status: DISCONTINUED | OUTPATIENT
Start: 2022-12-10 | End: 2022-12-10 | Stop reason: HOSPADM

## 2022-12-10 RX ORDER — LIDOCAINE HYDROCHLORIDE 10 MG/ML
INJECTION, SOLUTION EPIDURAL; INFILTRATION; INTRACAUDAL; PERINEURAL PRN
Status: DISCONTINUED | OUTPATIENT
Start: 2022-12-10 | End: 2022-12-10 | Stop reason: SDUPTHER

## 2022-12-10 RX ORDER — METHOCARBAMOL 750 MG/1
750 TABLET, FILM COATED ORAL 4 TIMES DAILY
Status: DISCONTINUED | OUTPATIENT
Start: 2022-12-10 | End: 2022-12-13 | Stop reason: HOSPADM

## 2022-12-10 RX ORDER — SODIUM CHLORIDE, SODIUM LACTATE, POTASSIUM CHLORIDE, CALCIUM CHLORIDE 600; 310; 30; 20 MG/100ML; MG/100ML; MG/100ML; MG/100ML
INJECTION, SOLUTION INTRAVENOUS CONTINUOUS
Status: DISCONTINUED | OUTPATIENT
Start: 2022-12-10 | End: 2022-12-10 | Stop reason: HOSPADM

## 2022-12-10 RX ORDER — FENTANYL CITRATE 50 UG/ML
INJECTION, SOLUTION INTRAMUSCULAR; INTRAVENOUS PRN
Status: DISCONTINUED | OUTPATIENT
Start: 2022-12-10 | End: 2022-12-10 | Stop reason: SDUPTHER

## 2022-12-10 RX ORDER — FENTANYL CITRATE 50 UG/ML
25 INJECTION, SOLUTION INTRAMUSCULAR; INTRAVENOUS EVERY 5 MIN PRN
Status: COMPLETED | OUTPATIENT
Start: 2022-12-10 | End: 2022-12-10

## 2022-12-10 RX ORDER — BUPIVACAINE HYDROCHLORIDE AND EPINEPHRINE 5; 5 MG/ML; UG/ML
INJECTION, SOLUTION PERINEURAL PRN
Status: DISCONTINUED | OUTPATIENT
Start: 2022-12-10 | End: 2022-12-10 | Stop reason: ALTCHOICE

## 2022-12-10 RX ADMIN — FENTANYL CITRATE 50 MCG: 50 INJECTION, SOLUTION INTRAMUSCULAR; INTRAVENOUS at 10:20

## 2022-12-10 RX ADMIN — METHYLPREDNISOLONE SODIUM SUCCINATE 20 MG: 40 INJECTION, POWDER, FOR SOLUTION INTRAMUSCULAR; INTRAVENOUS at 13:27

## 2022-12-10 RX ADMIN — FENTANYL CITRATE 100 MCG: 50 INJECTION, SOLUTION INTRAMUSCULAR; INTRAVENOUS at 07:48

## 2022-12-10 RX ADMIN — PHENYLEPHRINE HYDROCHLORIDE 100 MCG: 10 INJECTION INTRAVENOUS at 09:36

## 2022-12-10 RX ADMIN — PROPOFOL 140 MG: 10 INJECTION, EMULSION INTRAVENOUS at 07:50

## 2022-12-10 RX ADMIN — SODIUM CHLORIDE, POTASSIUM CHLORIDE, SODIUM LACTATE AND CALCIUM CHLORIDE: 600; 310; 30; 20 INJECTION, SOLUTION INTRAVENOUS at 07:44

## 2022-12-10 RX ADMIN — PYRIDOSTIGMINE BROMIDE 60 MG: 60 TABLET ORAL at 17:36

## 2022-12-10 RX ADMIN — ASPIRIN 81 MG: 81 TABLET, CHEWABLE ORAL at 13:27

## 2022-12-10 RX ADMIN — METOPROLOL SUCCINATE 50 MG: 50 TABLET, FILM COATED, EXTENDED RELEASE ORAL at 13:27

## 2022-12-10 RX ADMIN — AMLODIPINE BESYLATE 5 MG: 5 TABLET ORAL at 13:28

## 2022-12-10 RX ADMIN — ONDANSETRON 4 MG: 2 INJECTION INTRAMUSCULAR; INTRAVENOUS at 10:00

## 2022-12-10 RX ADMIN — METRONIDAZOLE 500 MG: 500 TABLET, FILM COATED ORAL at 17:36

## 2022-12-10 RX ADMIN — PYRIDOSTIGMINE BROMIDE 60 MG: 60 TABLET ORAL at 13:28

## 2022-12-10 RX ADMIN — METHOCARBAMOL TABLETS 750 MG: 750 TABLET, COATED ORAL at 21:36

## 2022-12-10 RX ADMIN — PHENYLEPHRINE HYDROCHLORIDE 100 MCG: 10 INJECTION INTRAVENOUS at 08:09

## 2022-12-10 RX ADMIN — GABAPENTIN 100 MG: 100 CAPSULE ORAL at 21:36

## 2022-12-10 RX ADMIN — SODIUM CHLORIDE, POTASSIUM CHLORIDE, SODIUM LACTATE AND CALCIUM CHLORIDE: 600; 310; 30; 20 INJECTION, SOLUTION INTRAVENOUS at 07:38

## 2022-12-10 RX ADMIN — FENTANYL CITRATE 25 MCG: 50 INJECTION, SOLUTION INTRAMUSCULAR; INTRAVENOUS at 11:05

## 2022-12-10 RX ADMIN — PHENYLEPHRINE HYDROCHLORIDE 100 MCG: 10 INJECTION INTRAVENOUS at 08:05

## 2022-12-10 RX ADMIN — CEFAZOLIN 2 G: 1 INJECTION, POWDER, FOR SOLUTION INTRAMUSCULAR; INTRAVENOUS at 08:06

## 2022-12-10 RX ADMIN — TRAMADOL HYDROCHLORIDE 50 MG: 50 TABLET, COATED ORAL at 10:58

## 2022-12-10 RX ADMIN — GABAPENTIN 100 MG: 100 CAPSULE ORAL at 13:38

## 2022-12-10 RX ADMIN — GLYCOPYRROLATE 0.2 MG: 0.2 INJECTION INTRAMUSCULAR; INTRAVENOUS at 08:12

## 2022-12-10 RX ADMIN — FENTANYL CITRATE 25 MCG: 50 INJECTION, SOLUTION INTRAMUSCULAR; INTRAVENOUS at 10:58

## 2022-12-10 RX ADMIN — ROCURONIUM BROMIDE 10 MG: 10 INJECTION, SOLUTION INTRAVENOUS at 09:25

## 2022-12-10 RX ADMIN — FENTANYL CITRATE 50 MCG: 50 INJECTION, SOLUTION INTRAMUSCULAR; INTRAVENOUS at 08:25

## 2022-12-10 RX ADMIN — PYRIDOSTIGMINE BROMIDE 60 MG: 60 TABLET ORAL at 23:25

## 2022-12-10 RX ADMIN — ROCURONIUM BROMIDE 50 MG: 10 INJECTION, SOLUTION INTRAVENOUS at 07:50

## 2022-12-10 RX ADMIN — SUGAMMADEX 300 MG: 100 INJECTION, SOLUTION INTRAVENOUS at 10:18

## 2022-12-10 RX ADMIN — Medication 1 TABLET: at 13:27

## 2022-12-10 RX ADMIN — PYRIDOSTIGMINE BROMIDE 60 MG: 60 TABLET ORAL at 06:07

## 2022-12-10 RX ADMIN — ROCURONIUM BROMIDE 10 MG: 10 INJECTION, SOLUTION INTRAVENOUS at 09:11

## 2022-12-10 RX ADMIN — METHYLPREDNISOLONE SODIUM SUCCINATE 20 MG: 40 INJECTION, POWDER, FOR SOLUTION INTRAMUSCULAR; INTRAVENOUS at 06:08

## 2022-12-10 RX ADMIN — INDOCYANINE GREEN AND WATER 5 MG: KIT at 08:00

## 2022-12-10 RX ADMIN — METHOCARBAMOL TABLETS 750 MG: 750 TABLET, COATED ORAL at 17:36

## 2022-12-10 RX ADMIN — PHENYLEPHRINE HYDROCHLORIDE 20 MCG/MIN: 10 INJECTION INTRAVENOUS at 08:34

## 2022-12-10 RX ADMIN — Medication 3 ML: at 06:07

## 2022-12-10 RX ADMIN — ROCURONIUM BROMIDE 10 MG: 10 INJECTION, SOLUTION INTRAVENOUS at 08:30

## 2022-12-10 RX ADMIN — SODIUM CHLORIDE, POTASSIUM CHLORIDE, SODIUM LACTATE AND CALCIUM CHLORIDE: 600; 310; 30; 20 INJECTION, SOLUTION INTRAVENOUS at 09:30

## 2022-12-10 RX ADMIN — ROCURONIUM BROMIDE 10 MG: 10 INJECTION, SOLUTION INTRAVENOUS at 08:48

## 2022-12-10 RX ADMIN — DIVALPROEX SODIUM 125 MG: 125 TABLET, DELAYED RELEASE ORAL at 21:36

## 2022-12-10 RX ADMIN — LIDOCAINE HYDROCHLORIDE 50 MG: 10 INJECTION, SOLUTION EPIDURAL; INFILTRATION; INTRACAUDAL; PERINEURAL at 07:48

## 2022-12-10 RX ADMIN — CEFTRIAXONE SODIUM 1000 MG: 10 INJECTION, POWDER, FOR SOLUTION INTRAVENOUS at 17:36

## 2022-12-10 ASSESSMENT — PAIN SCALES - GENERAL
PAINLEVEL_OUTOF10: 5
PAINLEVEL_OUTOF10: 0
PAINLEVEL_OUTOF10: 0
PAINLEVEL_OUTOF10: 5
PAINLEVEL_OUTOF10: 3
PAINLEVEL_OUTOF10: 2
PAINLEVEL_OUTOF10: 8
PAINLEVEL_OUTOF10: 5

## 2022-12-10 ASSESSMENT — PAIN - FUNCTIONAL ASSESSMENT
PAIN_FUNCTIONAL_ASSESSMENT: 0-10
PAIN_FUNCTIONAL_ASSESSMENT: PREVENTS OR INTERFERES SOME ACTIVE ACTIVITIES AND ADLS

## 2022-12-10 ASSESSMENT — COPD QUESTIONNAIRES: CAT_SEVERITY: MILD

## 2022-12-10 ASSESSMENT — PAIN SCALES - WONG BAKER
WONGBAKER_NUMERICALRESPONSE: 0
WONGBAKER_NUMERICALRESPONSE: 0

## 2022-12-10 ASSESSMENT — PAIN DESCRIPTION - ORIENTATION: ORIENTATION: RIGHT;LOWER

## 2022-12-10 ASSESSMENT — PAIN DESCRIPTION - LOCATION: LOCATION: ABDOMEN

## 2022-12-10 ASSESSMENT — PAIN DESCRIPTION - DESCRIPTORS: DESCRIPTORS: ACHING;CRAMPING

## 2022-12-10 NOTE — PROGRESS NOTES
Progress Note(Hospital)    [facility] RENAL//MED SURG     Admition Status:      CC:Abdominal Pain and Back Pain      From Allied Health:Rev    HCC:  Pt doing well post op. Gen surg following. Abx for 24 hours. Gen surg has order pending.   Neuro following post op due to myesthenia issues in the past.          Current Facility-Administered Medications:     [START ON 12/11/2022] enoxaparin (LOVENOX) injection 40 mg, 40 mg, SubCUTAneous, Daily, Agustín Bobbymery, DO    gabapentin (NEURONTIN) capsule 100 mg, 100 mg, Oral, TID, Brendan Plate, DO, 100 mg at 12/10/22 1338    methocarbamol (ROBAXIN) tablet 750 mg, 750 mg, Oral, 4x Daily, Brendan Plate, DO    sodium chloride flush 0.9 % injection 5-40 mL, 5-40 mL, IntraVENous, 2 times per day, Agustínallison EsparzaMendoza, DO, 10 mL at 12/09/22 2028    sodium chloride flush 0.9 % injection 5-40 mL, 5-40 mL, IntraVENous, PRN, Agustín Mendoza, DO    0.9 % sodium chloride infusion, , IntraVENous, PRN, Agustín Bobbymery, DO    acetaminophen (TYLENOL) tablet 650 mg, 650 mg, Oral, Q4H PRN, Agustín Bobbymery, DO    ondansetron (ZOFRAN-ODT) disintegrating tablet 4 mg, 4 mg, Oral, Q8H PRN **OR** ondansetron (ZOFRAN) injection 4 mg, 4 mg, IntraVENous, Q6H PRN, Agustín Mendoza, DO, 4 mg at 12/09/22 1343    0.9 % sodium chloride infusion, , IntraVENous, Continuous, Agustín Mendoza, DO, Stopped at 12/10/22 5201    pyridostigmine (MESTINON) tablet 60 mg, 60 mg, Oral, 5x Daily, Agustín Mendoza, DO, 60 mg at 12/10/22 1328    aspirin chewable tablet 81 mg, 81 mg, Oral, Daily, Agustín Bobbymery, DO, 81 mg at 12/10/22 1327    divalproex (DEPAKOTE) DR tablet 125 mg, 125 mg, Oral, BID, Agustínallison Mendoza DO, 125 mg at 12/09/22 2236    metoprolol succinate (TOPROL XL) extended release tablet 50 mg, 50 mg, Oral, Daily, Agustín Mendoza DO, 50 mg at 12/10/22 1327    pantoprazole (PROTONIX) tablet 40 mg, 40 mg, Oral, Daily, Agustín Mendoza DO, 40 mg at 12/09/22 1758    therapeutic multivitamin-minerals 1 tablet, 1 tablet, Oral, Daily, Arletha Rossy, DO, 1 tablet at 12/10/22 1327    amLODIPine (NORVASC) tablet 5 mg, 5 mg, Oral, Daily, Arletha Rossy, DO, 5 mg at 12/10/22 1328    [COMPLETED] Saline lock IV, , , Continuous **AND** sodium chloride flush 0.9 % injection 3 mL, 3 mL, IntraVENous, Q8H, John COLLINS Lamb, DO, 3 mL at 12/10/22 0607    O:  /73   Pulse 90   Temp 97.9 °F (36.6 °C) (Oral)   Resp 18   Ht 5' 9\" (1.753 m)   Wt 165 lb (74.8 kg)   SpO2 95%   BMI 24.37 kg/m²     Intake/Output Summary (Last 24 hours) at 12/10/2022 1453  Last data filed at 12/10/2022 1027  Gross per 24 hour   Intake 3357.9 ml   Output 910 ml   Net 2447.9 ml       Physical Exam  Constitutional:       General: He is not in acute distress. Appearance: He is well-developed. HENT:      Head: Normocephalic and atraumatic. Mouth/Throat:      Mouth: Mucous membranes are moist.   Eyes:      Extraocular Movements: Extraocular movements intact. Pupils: Pupils are equal, round, and reactive to light. Neck:      Vascular: No JVD. Cardiovascular:      Rate and Rhythm: Normal rate and regular rhythm. Heart sounds: No murmur heard. No friction rub. Pulmonary:      Effort: Pulmonary effort is normal. No respiratory distress. Breath sounds: Normal breath sounds. No stridor. Abdominal:      Tenderness: There is abdominal tenderness. Genitourinary:     Comments: No Examined  Skin:     General: Skin is warm and dry. Neurological:      General: No focal deficit present. Mental Status: He is alert.        Labs:      Lab Results   Component Value Date/Time    WBC 26.8 (H) 12/10/2022 06:19 AM    HGB 13.7 12/10/2022 06:19 AM    HCT 44.0 12/10/2022 06:19 AM     12/10/2022 06:19 AM    NEUTROABS 23.31 (H) 12/10/2022 06:19 AM     Lab Results   Component Value Date/Time     12/10/2022 06:19 AM    K 4.6 12/10/2022 06:19 AM     12/10/2022 06:19 AM    CREATININE 1.61 (H) 12/10/2022 06:19 AM    BUN 20 12/10/2022 [I10]     Chronic kidney disease, stage III (moderate) ([facility] [address] [N18.30]            P:  Myesthenia being followed by Neuro. Pt doing well thus far. Call was placed to daughter last pm , no response. Antiseizure meds started. Cholecycstitis discovered at surgery treated effectively with cholecystectomy and drain as well as abx for 24 hours. Resident called to clarify if Surgery team still agreeable to abx. EPC on for DVT prophylaxis. On enoxaprin. CKD 3. Stable , no Morphine use for now. Lovenox still ok. Stress ulcer protection on board. Pain control per Gen Surg. HTN fairly well controlled. Jay Acosta,   2:53 PM  12/10/2022    This note was created with the assistance of a speech-recognition program. Although the intention is to generate a document that actually reflects the content of the visit, no guarantees can be provided that every mistake has been identified and corrected by editing.

## 2022-12-10 NOTE — PROGRESS NOTES
I called pt maris called this evening  so she can be updated on plans. Message left on phone. Pt still with nausea and some pain on palp of RUQ. Pt does have hx of post anesthesia problems related to his Myasthenia and his Seizures. Consult placed to neurology to be on board. Also asked Cardiology to see pt to assure no cardiac concerns.     Mehul Acosta, DO   12/9/22   7:34 PM EST
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
## 2022-12-10 NOTE — PLAN OF CARE
Problem: Discharge Planning  Goal: Discharge to home or other facility with appropriate resources  12/10/2022 0414 by Zaid Monson RN  Outcome: Progressing  12/9/2022 2140 by Zaid Monson RN  Outcome: Progressing     Problem: Pain  Goal: Verbalizes/displays adequate comfort level or baseline comfort level  12/10/2022 0414 by Zaid Monson RN  Outcome: Progressing  12/9/2022 2140 by Zaid Monson RN  Outcome: Progressing     Problem: Skin/Tissue Integrity  Goal: Absence of new skin breakdown  Description: 1. Monitor for areas of redness and/or skin breakdown  2. Assess vascular access sites hourly  3. Every 4-6 hours minimum:  Change oxygen saturation probe site  4. Every 4-6 hours:  If on nasal continuous positive airway pressure, respiratory therapy assess nares and determine need for appliance change or resting period.   12/10/2022 0414 by Zaid Monson RN  Outcome: Progressing  12/9/2022 2140 by Zaid Monson RN  Outcome: Progressing     Problem: Safety - Adult  Goal: Free from fall injury  12/10/2022 0414 by Zaid Monson RN  Outcome: Progressing  12/9/2022 2140 by Zaid Monson RN  Outcome: Progressing     Problem: Chronic Conditions and Co-morbidities  Goal: Patient's chronic conditions and co-morbidity symptoms are monitored and maintained or improved  12/10/2022 0414 by Zaid Monson RN  Outcome: Progressing  12/9/2022 2140 by Zaid Monson RN  Outcome: Progressing
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
## 2022-12-10 NOTE — DISCHARGE INSTRUCTIONS
No lifting above 10lbs for next 2 weeks. No soaking in bathtubs for 4 weeks  Resume activity as tolerated  Wash incision gently with soap and water, pat dry. If steri-strips or surgical glue in place wash gently and leave in place until the glue or strips fall off. (Do not pull/tug)  No operating heavy equipment while using narcotics  F/u in trauma/acute care surgery clinic in 1-2 weeks  Call your doctor for the following:   Chills   Temperature greater than 101   Pain that is not tolerable despite taking pain medicine as ordered   There is increased swelling, redness or warmth at surgical site   There is increased drainage or bleeding from surgical site  No alcoholic beverages, no driving or operating machinery, no making important decisions for 24 hours. Urinate within 8 hours after surgery    General questions or concerns may be called to the trauma nurse line at [phone] and please leave a message.

## 2022-12-10 NOTE — OP NOTE
Operative Note      Patient: Jennifer Johnson  YOB: 1950  MRN: [id number]    Date of Procedure: 12/10/2022    Pre-Op Diagnosis: CHOLECYSTITIS    Post-Op Diagnosis: Acute on Chronic cholecystitis with purulence. Procedure(s):  XI ROBOTIC LAPAROSCOPIC CHOLECYSTECTOMY, FIREFLY    Surgeon(s):  Sukhdev Valdes MD    Assistant:   * No surgical staff found *    Anesthesia: General    Estimated Blood Loss (mL): less than 248     Complications: None    Specimens:   ID Type Source Tests Collected by Time Destination   A : GALLBLADDER AND CONTENTS Tissue Gallbladder SURGICAL PATHOLOGY Sukhdev Valdes MD 12/10/2022 1002        Implants:  Implant Name Type Inv. Item Serial No.  Lot No. LRB No. Used Action   CLIP INT L POLYMER DAVIE LIG HEM O DAVIE - [id number]  CLIP INT L POLYMER DAVIE LIG HEM O DAVIE  [employer]  N/A 1 Implanted   CLIP INT L POLYMER DAVIE LIG HEM O DAVIE - [id number]  CLIP INT L POLYMER DAVIE LIG HEM O DAVIE  [employer]  N/A 1 Implanted         Drains:   Closed/Suction Drain Lateral RUQ Bulb (Active)       [REMOVED] Gastrostomy/Enterostomy/Jejunostomy Tube Percutaneous Endoscopic Gastrostomy (PEG) 1 (Removed)       Findings: tense and inflamed gallbladder with purulence at time of operation. Detailed Description of Procedure: This is a 70 yo M who had RUQ pain and large stone on ultrasound and CT scan. Given the patient's symptoms he was taken to the operating room suite for robotic cholecystectomy. The risks and benefits of the operation were explained to the patient and he was agreeable to proceed. Discussion was also had with his daughter. The patient was taken to the operating room suite and laid in the supine position. General anesthesia was provided by the anesthesia department. Time out was performed. The abdomen was prepped and draped in the usual sterile fashion. Local anesthetic was used on all incision sites.  A 12mm incision was made at [place] point and veress needle was inserted into the abdomen. The abdomen was insufflated to 15mmhg. 3 additional 8mm ports were placed across the upper abdomen. Inspection of the RUQ showed that the omentum was completely obscuring the gallbladder. The omentum was slowly taken off using gentle blunt dissection. This allowed me to visualize a very tense and acutely inflamed gallbladder. The gallbladder was difficult to mobilize due to the inflamed state and tenseness and decision was made to aspirate the gallbladder. A hole was made in the fundus and suction was used to suction the fluid. There was purulence inside the gallbladder. I then turned my attention to dissection of the gallbladder off of the liver bed. The cystic structures appeared to be very inflamed and stuck onto the cbd. He appeared to have a very short cystic duct. Decision was made to then proceed with a top down approach for dissecting the gallbladder off of the liver bed. One the infundibulum was reached, I could not safely dissect any further without potentially injuring the common bile duct. Decision was made to then staple across the distal infundibulum. This was performed using the 60mm sureform green stapler. Once this was done the gallbladder was placed in an endocatch bag. Hemostasis was ensured. Surgicel powder was placed in the liver bed for additional hemostasis. Due to the purulence and severe inflammation, a 19F AMBREEN drain was placed through one of the incisions into the RUQ. Ports were then removed and the abdomen was dessuflated. The gallbladder was sent off as specimen. An 0 viryl suture was used to close the 12mm fascia. 4-0 monocryl was used to close the skin incisions. Skin glue was applied. Patient was extubated and tolerated the procedure well. He was taken to PACU in stable condition. Recommend:   -Continue IV abx for at least 24 hours. -monitor drain output  -OK for clears today and will advance as tolerated.      Marta Aguirre Lay Smith MD    Electronically signed by Stephenie Yao MD on 12/10/2022 at 10:21 AM

## 2022-12-10 NOTE — ANESTHESIA POSTPROCEDURE EVALUATION
Department of Anesthesiology  Postprocedure Note    Patient: Orquidea Russell  MRN: [id number]  YOB: 1950  Date of evaluation: 12/10/2022      Procedure Summary     Date: 12/10/22 Room / Location: [address]    Anesthesia Start: 7132 Anesthesia Stop: 1031    Procedure: XI ROBOTIC LAPAROSCOPIC CHOLECYSTECTOMY, FIREFLY Diagnosis:       Acute cholecystitis      (CHOLECYSTITIS)    Surgeons: Sol Hampton MD Responsible Provider: Carey Anderson MD    Anesthesia Type: general ASA Status: 4 - Emergent          Anesthesia Type: No value filed.     Trevin Phase I: Trevin Score: 10    Trevin Phase II:        Anesthesia Post Evaluation    Patient location during evaluation: PACU  Patient participation: complete - patient participated  Level of consciousness: awake and alert  Airway patency: patent  Nausea & Vomiting: no nausea and no vomiting  Complications: no  Cardiovascular status: blood pressure returned to baseline  Respiratory status: acceptable  Hydration status: euvolemic
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
## 2022-12-10 NOTE — CONSULTS
Neurology Consult Note        Reason for Consult:  Donnal Ano  Requesting Physician:  Dr. Ann Marie Dean:  abdominal pain    History Obtained From:  patient, electronic medical record       HISTORY OF PRESENT ILLNESS:              The patient is a 71 y.o. right-handed male with significant past medical history of baseline RUE weakness/decreased ROM due to prior shoulder injury, questionable seizure disorder, vit B12 deficiency, peripheral LE neuropathy, HTN, CKD, mild aortic valve stenosis and MR, arthritis, BPH, myasthenia gravis maintained on mestinon, and hearing loss who presents with abdominal pain, is admitted under Family Medicine, seen by Gen Surgery and with plans for cholecystectomy for suspected symptomatic cholelithiasis on 12/10. Neurology is consulted on 12/9 to assess patient pre-operatively due to patient's history of myasthenia gravis and prior anesthetic problems regarding his myasthenia.      Regarding his MG:  - has hx of myasthenic crisis requiring IVIG 07/2016  - Mestinon po 60 mg dosing is prescribed for 4 times daily; patient takes it twice daily (once in morning, once in early evening) due to a/e of LE twitching but will take up to 4 times daily on days when he is really active and feels that he needs it (he knows he needs it when his \"face starts drooping\")  - has had dysphagia postoperatively in past requiring PEG tube placement; swallow function improved and PEG removed 9/12/2022  - Patient reports MG diagnosed 10+ years ago by eye doctor, initial symptoms were eye drooping  - Patient feels MG symptoms are currently well controlled;  => he has baseline RUE weakness from past shoulder injury and LLE weakness from past hip surgery but he feels strength is at baseline,   => uses cane to ambulate at baseline bc of imbalance and fear of falling  => denies any current visual disturbance, diplopia, dysphagia, dysarthria, respiratory difficulty    Regarding his sodium chloride flush 0.9 % injection 3 mL, 3 mL, IntraVENous, Q8H  Allergies:  Morphine, Pcn [penicillins], and Sulfa antibiotics    Social History:  TOBACCO:   reports that he has quit smoking. His smoking use included cigarettes. He smoked an average of 1 pack per day. He has never used smokeless tobacco.  ETOH:   reports no history of alcohol use. DRUGS:   reports no history of drug use. Family History:   No family history on file. REVIEW OF SYSTEMS:  Review of Systems   Constitutional:  Negative for fatigue. HENT:  Negative for trouble swallowing. Eyes:  Negative for visual disturbance. Respiratory:  Negative for shortness of breath. Musculoskeletal:  Negative for gait problem (uses cane at baseline). Neurological:  Positive for tremors (LE, attributes to mestinon side effect), weakness (chornic right arm and left left from prior injuries/surgeries; at baseline) and numbness (chornic, at feet, attributes to B12 deficiecncy). Negative for dizziness, seizures, facial asymmetry, speech difficulty and headaches. PHYSICAL EXAM:    Vitals:  BP (!) 148/82   Pulse (!) 109   Temp 99.2 °F (37.3 °C) (Oral)   Resp 16   Wt 165 lb (74.8 kg)   SpO2 97%   BMI 24.37 kg/m²      General Appearance:  lying in bed, appears calm, NAD    Mental status   Alert.   Oriented to person, place, and time  Speech is fluent without paraphasic errors, no dysarthria  Follows commands  Language appropriate  No hallucinations or delusions   Cranial nerves   II - Visual fields intact  III, IV, VI - extra-ocular movements intact, pupils equal/round/reactive; no nystagmus, no ptosis       V - sensation symmetric         VII -  No facial droop or NLF  VIII - impaired hearing bilaterally         IX, X - symmetrical palate elevation   XI - 5/5 strength  XII - tongue midline   Motor function  RUE: 4-/5 strength at proximal and distal muscle groups and with decreased ROM; can only abduct shoulder about 10-15 degrees, decreased hand  strength (pt reports at baseline from hx of shoulder injury/surgery with nerve damage)  LUE: 5/5 strength at proximal and distal muscle groups  RLE: 5/5 strength at proximal and distal muscle groups  LLE: 4/5 strength at proximal muscle groups (reports this is baseline since hip surgery), 5/5 strength with plantarflexion     Normal muscle bulk. No increased tone  Involuntary twitching of distal LLE noted   Sensory function Symmetric to touch throughout   Cerebellar No dysmetria on finger to nose test although limited assessment at RUE due to decreased ROM   Gait                  Requires some assistance getting up from bed,  Walks independently with cane, able to walk a few steps with cane picked up so that it is not touching floor       Additional Examination Elements and Findings:  petechiae at distal lower extremities      DATA  Lab Results:   CBC:   Recent Labs     12/08/22  2304   WBC 11.4*   HGB 14.6        BMP:    Recent Labs     12/08/22  2304      K 4.6      CO2 21   BUN 20   CREATININE 1.70*   GLUCOSE 121*         Lab Results   Component Value Date    ALT 21 12/08/2022    AST 24 12/08/2022    INR 1.0 05/23/2022       Lab Results   Component Value Date    VALPROATE <3 (L) 07/19/2016       Imaging:  CT ABDOMEN PELVIS W IV CONTRAST Additional Contrast? None    Result Date: 12/9/2022  There is a 14 mm faintly rim calcified gallstone impacted in the gallbladder neck. This gallstone was located in the body of the gallbladder on 05/23/2022. No other acute finding in the abdomen or pelvis. US ABDOMEN LIMITED Specify organ? GALLBLADDER    Result Date: 12/9/2022  Cholelithiasis. No cholecystitis. IMPRESSION/RECOMMENDATIONS:     Case of a 70 yo male with hx of MG on pyridostigmine who is hospitalized for management of abdominal pain with plans for cholecystectomy on 12/10.  Has hx of postoperative dysphagia requiring PEG tube placement in past which was subsequently removed. Patient reports he is currently at baseline. Denies any visual disturbance, dysphagia, SOB, weakness other than chronic weakness from prior right shoulder and left hip injuries/surgeries. Myasthenia gravis, appears well controlled on Mestonin 60 mg po 2 - 4 times daily  Currently at baseline, plan for surgery 12/10  Continue home dose Mestonin  Will give IV Solumedrol 20 mg Q8H x2 doses total tomorrow in anticipation of possible MG exacerbation from stress of surgery  We will continue to follow and reassess need for further steroid or other treatment after surgery  Risks of surgery related exacerbation of MG/MG crisis discussed with patient; he expressed understanding, hopes he will not have complications, but is agreeable to postoperative PEG tube or intubation if need be    Case reviewed and discussed with attending Dr. Zain Leon.       Cody Pearson DO  Neurology Resident PGY2  12/9/2022

## 2022-12-11 LAB
ABSOLUTE EOS #: <0.03 K/UL (ref 0–0.44)
ABSOLUTE IMMATURE GRANULOCYTE: 0.13 K/UL (ref 0–0.3)
ABSOLUTE LYMPH #: 0.89 K/UL (ref 1.1–3.7)
ABSOLUTE MONO #: 1.06 K/UL (ref 0.1–1.2)
ANION GAP SERPL CALCULATED.3IONS-SCNC: 12 MMOL/L (ref 9–17)
BASOPHILS # BLD: 0 % (ref 0–2)
BASOPHILS ABSOLUTE: 0.05 K/UL (ref 0–0.2)
BUN BLDV-MCNC: 39 MG/DL (ref 8–23)
CALCIUM SERPL-MCNC: 8.4 MG/DL (ref 8.6–10.4)
CHLORIDE BLD-SCNC: 105 MMOL/L (ref 98–107)
CO2: 20 MMOL/L (ref 20–31)
CREAT SERPL-MCNC: 1.9 MG/DL (ref 0.7–1.2)
EOSINOPHILS RELATIVE PERCENT: 0 % (ref 1–4)
GFR SERPL CREATININE-BSD FRML MDRD: 37 ML/MIN/1.73M2
GLUCOSE BLD-MCNC: 116 MG/DL (ref 70–99)
HCT VFR BLD CALC: 36.9 % (ref 40.7–50.3)
HEMOGLOBIN: 11.5 G/DL (ref 13–17)
IMMATURE GRANULOCYTES: 1 %
LYMPHOCYTES # BLD: 4 % (ref 24–43)
MAGNESIUM: 2 MG/DL (ref 1.6–2.6)
MCH RBC QN AUTO: 27.9 PG (ref 25.2–33.5)
MCHC RBC AUTO-ENTMCNC: 31.2 G/DL (ref 28.4–34.8)
MCV RBC AUTO: 89.6 FL (ref 82.6–102.9)
MONOCYTES # BLD: 5 % (ref 3–12)
NRBC AUTOMATED: 0 PER 100 WBC
PDW BLD-RTO: 14.2 % (ref 11.8–14.4)
PLATELET # BLD: 207 K/UL (ref 138–453)
PMV BLD AUTO: 11.1 FL (ref 8.1–13.5)
POTASSIUM SERPL-SCNC: 4.5 MMOL/L (ref 3.7–5.3)
RBC # BLD: 4.12 M/UL (ref 4.21–5.77)
SEG NEUTROPHILS: 90 % (ref 36–65)
SEGMENTED NEUTROPHILS ABSOLUTE COUNT: 17.89 K/UL (ref 1.5–8.1)
SODIUM BLD-SCNC: 137 MMOL/L (ref 135–144)
WBC # BLD: 20 K/UL (ref 3.5–11.3)

## 2022-12-11 PROCEDURE — 83735 ASSAY OF MAGNESIUM: CPT

## 2022-12-11 PROCEDURE — 2580000003 HC RX 258

## 2022-12-11 PROCEDURE — 6370000000 HC RX 637 (ALT 250 FOR IP)

## 2022-12-11 PROCEDURE — 36415 COLL VENOUS BLD VENIPUNCTURE: CPT

## 2022-12-11 PROCEDURE — 85025 COMPLETE CBC W/AUTO DIFF WBC: CPT

## 2022-12-11 PROCEDURE — 1200000000 HC SEMI PRIVATE

## 2022-12-11 PROCEDURE — 6360000002 HC RX W HCPCS

## 2022-12-11 PROCEDURE — 80048 BASIC METABOLIC PNL TOTAL CA: CPT

## 2022-12-11 PROCEDURE — 99232 SBSQ HOSP IP/OBS MODERATE 35: CPT | Performed by: PSYCHIATRY & NEUROLOGY

## 2022-12-11 PROCEDURE — 2580000003 HC RX 258: Performed by: STUDENT IN AN ORGANIZED HEALTH CARE EDUCATION/TRAINING PROGRAM

## 2022-12-11 RX ORDER — SODIUM CHLORIDE, SODIUM LACTATE, POTASSIUM CHLORIDE, AND CALCIUM CHLORIDE .6; .31; .03; .02 G/100ML; G/100ML; G/100ML; G/100ML
1000 INJECTION, SOLUTION INTRAVENOUS ONCE
Status: COMPLETED | OUTPATIENT
Start: 2022-12-11 | End: 2022-12-11

## 2022-12-11 RX ADMIN — PANTOPRAZOLE SODIUM 40 MG: 40 TABLET, DELAYED RELEASE ORAL at 08:36

## 2022-12-11 RX ADMIN — SODIUM CHLORIDE, POTASSIUM CHLORIDE, SODIUM LACTATE AND CALCIUM CHLORIDE 1000 ML: 600; 310; 30; 20 INJECTION, SOLUTION INTRAVENOUS at 13:47

## 2022-12-11 RX ADMIN — METRONIDAZOLE 500 MG: 500 TABLET, FILM COATED ORAL at 16:42

## 2022-12-11 RX ADMIN — PYRIDOSTIGMINE BROMIDE 60 MG: 60 TABLET ORAL at 11:54

## 2022-12-11 RX ADMIN — PYRIDOSTIGMINE BROMIDE 60 MG: 60 TABLET ORAL at 16:41

## 2022-12-11 RX ADMIN — CEFTRIAXONE SODIUM 1000 MG: 10 INJECTION, POWDER, FOR SOLUTION INTRAVENOUS at 16:42

## 2022-12-11 RX ADMIN — METHOCARBAMOL TABLETS 750 MG: 750 TABLET, COATED ORAL at 08:37

## 2022-12-11 RX ADMIN — ASPIRIN 81 MG: 81 TABLET, CHEWABLE ORAL at 08:36

## 2022-12-11 RX ADMIN — PYRIDOSTIGMINE BROMIDE 60 MG: 60 TABLET ORAL at 07:42

## 2022-12-11 RX ADMIN — AMLODIPINE BESYLATE 5 MG: 5 TABLET ORAL at 08:36

## 2022-12-11 RX ADMIN — METRONIDAZOLE 500 MG: 500 TABLET, FILM COATED ORAL at 02:00

## 2022-12-11 RX ADMIN — GABAPENTIN 100 MG: 100 CAPSULE ORAL at 08:37

## 2022-12-11 RX ADMIN — METRONIDAZOLE 500 MG: 500 TABLET, FILM COATED ORAL at 08:37

## 2022-12-11 RX ADMIN — GABAPENTIN 100 MG: 100 CAPSULE ORAL at 21:08

## 2022-12-11 RX ADMIN — PYRIDOSTIGMINE BROMIDE 60 MG: 60 TABLET ORAL at 21:08

## 2022-12-11 RX ADMIN — SODIUM CHLORIDE, PRESERVATIVE FREE 10 ML: 5 INJECTION INTRAVENOUS at 08:37

## 2022-12-11 RX ADMIN — METHOCARBAMOL TABLETS 750 MG: 750 TABLET, COATED ORAL at 21:08

## 2022-12-11 RX ADMIN — Medication 1 TABLET: at 08:36

## 2022-12-11 RX ADMIN — ENOXAPARIN SODIUM 40 MG: 100 INJECTION SUBCUTANEOUS at 08:37

## 2022-12-11 RX ADMIN — DIVALPROEX SODIUM 125 MG: 125 TABLET, DELAYED RELEASE ORAL at 21:09

## 2022-12-11 RX ADMIN — GABAPENTIN 100 MG: 100 CAPSULE ORAL at 13:46

## 2022-12-11 RX ADMIN — METOPROLOL SUCCINATE 50 MG: 50 TABLET, FILM COATED, EXTENDED RELEASE ORAL at 08:37

## 2022-12-11 RX ADMIN — METHOCARBAMOL TABLETS 750 MG: 750 TABLET, COATED ORAL at 16:42

## 2022-12-11 RX ADMIN — METHOCARBAMOL TABLETS 750 MG: 750 TABLET, COATED ORAL at 13:46

## 2022-12-11 ASSESSMENT — PAIN SCALES - WONG BAKER
WONGBAKER_NUMERICALRESPONSE: 0

## 2022-12-11 ASSESSMENT — PAIN - FUNCTIONAL ASSESSMENT: PAIN_FUNCTIONAL_ASSESSMENT: ACTIVITIES ARE NOT PREVENTED

## 2022-12-11 ASSESSMENT — PAIN DESCRIPTION - ORIENTATION: ORIENTATION: LOWER

## 2022-12-11 ASSESSMENT — PAIN SCALES - GENERAL
PAINLEVEL_OUTOF10: 0
PAINLEVEL_OUTOF10: 0
PAINLEVEL_OUTOF10: 6
PAINLEVEL_OUTOF10: 5
PAINLEVEL_OUTOF10: 7

## 2022-12-11 ASSESSMENT — PAIN DESCRIPTION - LOCATION: LOCATION: ABDOMEN

## 2022-12-11 ASSESSMENT — PAIN DESCRIPTION - DESCRIPTORS: DESCRIPTORS: ACHING

## 2022-12-11 NOTE — PROGRESS NOTES
[id number] [facility]   [address] [place] [address]    Progress Note             Date:   12/11/2022  Patient name:  Sudheer Amato  Date of admission:  12/8/2022 10:27 PM  MRN:   [id number]  Account:  [de-identified]  YOB: 1950  PCP:    Carli Duff DO  Room:   [address] [place] [address]  Code Status:    Full Code    Chief Complaint:     Chief Complaint   Patient presents with    Abdominal Pain    Back Pain       Interval hx: The patient was seen and examined at bedside. Is vitally stable, alert and oriented x 3. No acute events overnight. The patient stated that he is close to his baseline, no dysphagia, or shortness of breath, no double vision or visual disturbance   RN noted that he was able to walk around the unit       Brief History of Present Illness: This is a 69-year-old male past medical history with baseline right upper extremity weakness/limited ROM due to shoulder injury, possible seizure disorder, peripheral lower extremity neuropathy, HTN, CKD, myasthenia gravis maintained on Mestinon, originally presented for abdominal pain plans for cholecystectomy for suspected symptomatic cholelithiasis on 12/10. Neuro consult to assess patient's preoperatively given his history of MG and prior anesthetic problems regarding his myasthenia. Was diagnosed with myasthenia gravis 10 years ago, last MG crisis requiring IVIG was 7/20/2016, currently is taking Mestinon 60 mg 4 times daily however occasionally takes it twice daily based on his activity and lower extremity symptoms. History of PEG tube placement due to dysphagia 9/2022.   - Denies ever having any other seizure-like episodes and tells me he is not taking any AED. However, appears pt is maintained on Depakote 125 mg BID Rx which is noted on home medication list, EEG 7/26/2016 was unremarkable.        Past Medical History:     Past Medical History:   Diagnosis Date    Arthritis     Chronic kidney disease     Dysmetabolic syndrome     Hypertension     Iron deficiency anemia, unspecified     Movement disorder     Myasthenia gravis ([facility] [address]     Proteinuria     Seizures ([facility] [address]         Past Surgical History:     Past Surgical History:   Procedure Laterality Date    CHOLECYSTECTOMY  12/10/2022    XI ROBOTIC LAPAROSCOPIC CHOLECYSTECTOMY, FIREFLY    KNEE SURGERY      QUADRACEPS TENDON REPAIR          Medications Prior to Admission:     Prior to Admission medications    Medication Sig Start Date End Date Taking? Authorizing Provider   vitamin D (ERGOCALCIFEROL) 1.25 MG (91512 UT) CAPS capsule Take 1 capsule by mouth once a week for 8 doses 5/24/22 7/13/22  Cachorro To DO   acetaminophen (TYLENOL) 500 MG tablet Take 2 tablets by mouth every 8 hours 5/24/22   Cachorro To DO   metoprolol succinate (TOPROL XL) 50 MG extended release tablet Take 50 mg by mouth daily    Historical Provider, MD   aspirin 81 MG chewable tablet Take 81 mg by mouth daily    Historical Provider, MD   pantoprazole (PROTONIX) 40 MG tablet Take 40 mg by mouth daily    Historical Provider, MD   pyridostigmine (MESTINON) 60 MG tablet Take 60 mg by mouth 5 times daily    Historical Provider, MD   ergocalciferol (DRISDOL) 73576 UNITS capsule Take 1 capsule by mouth once a week. Patient not taking: Reported on 12/9/2022 8/12/13   Keegan Saenz MD   amLODIPine (NORVASC) 5 MG tablet Take 1 tablet by mouth daily for 14 days. Patient taking differently: Take 5 mg by mouth in the morning and 5 mg in the evening. 8/12/13 8/26/13  Keegan Saenz MD   divalproex (DEPAKOTE) 125 MG DR tablet Take 125 mg by mouth 2 times daily   Patient not taking: Reported on 12/9/2022    Historical Provider, MD   metformin (GLUCOPHAGE) 500 MG tablet Take 500 mg by mouth daily (with breakfast). Historical Provider, MD   therapeutic multivitamin-minerals (THERAGRAN-M) tablet Take 1 tablet by mouth daily.     Historical Provider, MD        Allergies:     Morphine, Pcn [penicillins], and Sulfa antibiotics    Social History:     Tobacco:    reports that he has quit smoking. His smoking use included cigarettes. He smoked an average of 1 pack per day. He has never used smokeless tobacco.  Alcohol:      reports no history of alcohol use. Drug Use:  reports no history of drug use. Family History:     History reviewed. No pertinent family history. Review of Systems:     Review of Systems    Physical Exam:   /70   Pulse 70   Temp 97.7 °F (36.5 °C) (Oral)   Resp 16   Ht 5' 9\" (1.753 m)   Wt 165 lb (74.8 kg)   SpO2 96%   BMI 24.37 kg/m²   Temp (24hrs), Av.8 °F (36.6 °C), Min:96.8 °F (36 °C), Max:98.5 °F (36.9 °C)    Recent Labs     12/10/22  0719 12/10/22  1037   POCGLU 126* 167*       Intake/Output Summary (Last 24 hours) at 2022 0813  Last data filed at 2022 3052  Gross per 24 hour   Intake 1200 ml   Output 1170 ml   Net 30 ml         Neurologic Exam      Mental status    Alert oriented to 3, follows commands,    Cranial nerves    II - Visual fields intact  III, IV, VI - extra-ocular movements intact, pupils equal/round/reactive; no nystagmus, no ptosis       V - sensation symmetric         VII -  No facial droop or NLF  VIII - impaired hearing bilaterally         IX, X - symmetrical palate elevation   XI - 5/5 strength  XII - tongue midline   Motor function  RUE: 4-/5 strength at proximal and distal muscle groups  mostly restricted ROM due to shoulder injury   LUE: 5/5 strength at proximal and distal muscle groups  RLE: 5/5 strength at proximal and distal muscle groups  LLE: 4/5 strength at proximal muscle groups (reports this is baseline since hip surgery), 5/5 strength with plantarflexion     Normal muscle bulk.   No increased tone  Involuntary twitching of distal LLE noted   Sensory function Symmetric to touch throughout   Cerebellar No dysmetria on finger to nose test although limited assessment at RUE due to decreased ROM   Gait                  Walks with a cane Investigations:      Laboratory Testing:  Recent Results (from the past 24 hour(s))   POC Glucose Fingerstick    Collection Time: 12/10/22 10:37 AM   Result Value Ref Range    POC Glucose 167 (H) 75 - 110 mg/dL   CBC with Auto Differential    Collection Time: 12/10/22  3:47 PM   Result Value Ref Range    WBC 25.5 (H) 3.5 - 11.3 k/uL    RBC 4.57 4.21 - 5.77 m/uL    Hemoglobin 12.8 (L) 13.0 - 17.0 g/dL    Hematocrit 40.6 (L) 40.7 - 50.3 %    MCV 88.8 82.6 - 102.9 fL    MCH 28.0 25.2 - 33.5 pg    MCHC 31.5 28.4 - 34.8 g/dL    RDW 14.3 11.8 - 14.4 %    Platelets 486 098 - 950 k/uL    MPV 11.1 8.1 - 13.5 fL    NRBC Automated 0.0 0.0 per 100 WBC    Immature Granulocytes 0 0 %    Seg Neutrophils 91 (H) 36 - 66 %    Lymphocytes 3 (L) 24 - 44 %    Monocytes 6 1 - 7 %    Eosinophils % 0 (L) 1 - 4 %    Basophils 0 0 - 2 %    Absolute Immature Granulocyte 0.00 0.00 - 0.30 k/uL    Segs Absolute 23.20 (H) 1.8 - 7.7 k/uL    Absolute Lymph # 0.77 (L) 1.0 - 4.8 k/uL    Absolute Mono # 1.53 (H) 0.1 - 0.8 k/uL    Absolute Eos # 0.00 0.0 - 0.4 k/uL    Basophils Absolute 0.00 0.0 - 0.2 k/uL    Morphology Normal    Comprehensive Metabolic Panel w/ Reflex to MG    Collection Time: 12/10/22  3:47 PM   Result Value Ref Range    Glucose 135 (H) 70 - 99 mg/dL    BUN 24 (H) 8 - 23 mg/dL    Creatinine 1.95 (H) 0.70 - 1.20 mg/dL    Est, Glom Filt Rate 36 (L) >60 mL/min/1.73m2    Calcium 8.6 8.6 - 10.4 mg/dL    Sodium 138 135 - 144 mmol/L    Potassium 4.6 3.7 - 5.3 mmol/L    Chloride 103 98 - 107 mmol/L    CO2 18 (L) 20 - 31 mmol/L    Anion Gap 17 9 - 17 mmol/L    Alkaline Phosphatase 87 40 - 129 U/L    ALT 58 (H) 5 - 41 U/L    AST 74 (H) <40 U/L    Total Bilirubin 1.1 0.3 - 1.2 mg/dL    Total Protein 6.0 (L) 6.4 - 8.3 g/dL    Albumin 3.2 (L) 3.5 - 5.2 g/dL    Albumin/Globulin Ratio 1.1 1.0 - 2.5     Recent Labs     12/10/22  1547   WBC 25.5*   RBC 4.57   HGB 12.8*   HCT 40.6*   MCV 88.8   MCH 28.0   MCHC 31.5   RDW 14.3      MPV 11.1     Recent Labs     12/10/22  1547      K 4.6      CO2 18*   BUN 24*   CREATININE 1.95*   GLUCOSE 135*   CALCIUM 8.6   PROT 6.0*   LABALBU 3.2*   BILITOT 1.1   ALKPHOS 87   AST 74*   ALT 58*     No results found for: LABA1C    Assessment :      Primary Problem  Impacted gallstone of gallbladder    Active Hospital Problems    Diagnosis Date Noted    Gallstone pancreatitis [K85.10] 12/10/2022     Priority: Medium    Impacted gallstone of gallbladder [K80.20] 12/09/2022     Priority: Medium    Dysmetabolic syndrome [B08.53]     Myasthenia gravis ([facility] [address] [G70.00] 06/07/2013    Benign prostatic hyperplasia [N40.0] 06/07/2013    Seizure disorder ([facility] [address] [G40.909] 06/07/2013    Hypertension [I10]     Chronic kidney disease, stage III (moderate) ([facility] [address] [N18.30]        Patient is a 71 y.o. male past medical history of myasthenia gravis on Mestinon, questionable seizure, is here for abdominal pain was found to have cholelithiasis, surgery consult urology for preoperative evaluation, cholecystectomy performed on 12/10. Post op patient was tired,  no obvious new weakness, double vision, visual disturbance, or shortness of breath. Impression     Plan:       Given IV Solu-Medrol 20 mg every 8 hours for 2 doses prior to surgery to prevent exacerbation of MG  Continue home dose of Mestinon  Neurology will sign off         Follow-up further recommendations after discussing the case with attending  The plan was discussed with the patient, patient's family and the medical staff. Consultations:   IP CONSULT TO GENERAL SURGERY  IP CONSULT TO CARDIOLOGY  IP CONSULT TO NEUROLOGY    Patient is admitted as inpatient status because of co-morbidities listed above, severity of signs and symptoms as outlined, requirement for current medical therapies and most importantly because of direct risk to patient if care not provided in a hospital setting.     Phyllis Suarez MD  Neurology Resident PGY-2  12/11/2022  8:13 AM    Copy sent to Dr. Laney Campos DO

## 2022-12-11 NOTE — PROGRESS NOTES
Progress Note(Hospital)    [facility]//MED SURG     Admition Status:      CC:Abdominal Pain and Back Pain      From [facility]: Reviewed    HCC:  Patient states he is doing pretty well currently. No chest pains or shortness of breath. We reviewed his labs with him his creatinine is elevated.           Current Facility-Administered Medications:     lactated ringers bolus, 1,000 mL, IntraVENous, Once, Rock Romana MD    enoxaparin (LOVENOX) injection 40 mg, 40 mg, SubCUTAneous, Daily, Argie Zafar, DO, 40 mg at 12/11/22 2649    cefTRIAXone (ROCEPHIN) 1,000 mg in sterile water 10 mL IV syringe, 1,000 mg, IntraVENous, Q24H, Argie [place], DO, 1,000 mg at 12/10/22 1736    metroNIDAZOLE (FLAGYL) tablet 500 mg, 500 mg, Oral, 3 times per day, Argie [place], DO, 500 mg at 12/11/22 7732    gabapentin (NEURONTIN) capsule 100 mg, 100 mg, Oral, TID, Thelda Hook, DO, 100 mg at 12/11/22 5123    methocarbamol (ROBAXIN) tablet 750 mg, 750 mg, Oral, 4x Daily, Thelda Hook, DO, 750 mg at 12/11/22 0602    sodium chloride flush 0.9 % injection 5-40 mL, 5-40 mL, IntraVENous, 2 times per day, Argie [place], DO, 10 mL at 12/11/22 9783    sodium chloride flush 0.9 % injection 5-40 mL, 5-40 mL, IntraVENous, PRN, Argie [place], DO    0.9 % sodium chloride infusion, , IntraVENous, PRN, Argie Zafar, DO    acetaminophen (TYLENOL) tablet 650 mg, 650 mg, Oral, Q4H PRN, Argie Zafar, DO    ondansetron (ZOFRAN-ODT) disintegrating tablet 4 mg, 4 mg, Oral, Q8H PRN **OR** ondansetron (ZOFRAN) injection 4 mg, 4 mg, IntraVENous, Q6H PRN, Argie Zafar, DO, 4 mg at 12/09/22 1343    0.9 % sodium chloride infusion, , IntraVENous, Continuous, Argie [place], DO, Stopped at 12/10/22 6083    pyridostigmine (MESTINON) tablet 60 mg, 60 mg, Oral, 5x Daily, Ruy Stephen DO, 60 mg at 12/11/22 1154    aspirin chewable tablet 81 mg, 81 mg, Oral, Daily, Ruy Stephen DO, 81 mg at 12/11/22 0836    divalproex (DEPAKOTE)  tablet 125 mg, 125 mg, Oral, BID, Vilinda Dynes, DO, 125 mg at 12/10/22 2136    metoprolol succinate (TOPROL XL) extended release tablet 50 mg, 50 mg, Oral, Daily, Vilinda Dynes, DO, 50 mg at 12/11/22 1574    pantoprazole (PROTONIX) tablet 40 mg, 40 mg, Oral, Daily, Vilinda Dynes, DO, 40 mg at 12/11/22 9487    therapeutic multivitamin-minerals 1 tablet, 1 tablet, Oral, Daily, Vilinda Dynes, DO, 1 tablet at 12/11/22 0836    amLODIPine (NORVASC) tablet 5 mg, 5 mg, Oral, Daily, Vilinda Dynes, DO, 5 mg at 12/11/22 0836    [COMPLETED] Saline lock IV, , , Continuous **AND** sodium chloride flush 0.9 % injection 3 mL, 3 mL, IntraVENous, Q8H, Vilinda Dynes, DO, 3 mL at 12/10/22 0607    O:  /70   Pulse 70   Temp 97.7 °F (36.5 °C) (Oral)   Resp 16   Ht 5' 9\" (1.753 m)   Wt 165 lb (74.8 kg)   SpO2 96%   BMI 24.37 kg/m²     Intake/Output Summary (Last 24 hours) at 12/11/2022 1240  Last data filed at 12/11/2022 0729  Gross per 24 hour   Intake --   Output 1160 ml   Net -1160 ml       Physical Exam  Constitutional:       General: He is not in acute distress. Appearance: Normal appearance. He is well-developed. HENT:      Head: Normocephalic and atraumatic. Mouth/Throat:      Mouth: Mucous membranes are moist.   Eyes:      Pupils: Pupils are equal, round, and reactive to light. Neck:      Vascular: No JVD. Cardiovascular:      Rate and Rhythm: Normal rate and regular rhythm. Heart sounds: No murmur heard. No friction rub. Pulmonary:      Effort: Pulmonary effort is normal. No respiratory distress. Breath sounds: Normal breath sounds. No stridor. Abdominal:      General: Bowel sounds are normal.      Palpations: Abdomen is soft. Genitourinary:     Comments: No Examined  Skin:     General: Skin is warm and dry. Neurological:      General: No focal deficit present. Mental Status: He is alert and oriented to person, place, and time.    Psychiatric:         Mood and Affect: Mood normal.         Behavior: Behavior normal.         Thought Content: Thought content normal.         Judgment: Judgment normal.       Labs:      Lab Results   Component Value Date/Time    WBC 25.5 (H) 12/10/2022 03:47 PM    HGB 12.8 (L) 12/10/2022 03:47 PM    HCT 40.6 (L) 12/10/2022 03:47 PM     12/10/2022 03:47 PM    NEUTROABS 23.20 (H) 12/10/2022 03:47 PM     Lab Results   Component Value Date/Time     12/10/2022 03:47 PM    K 4.6 12/10/2022 03:47 PM     12/10/2022 03:47 PM    CREATININE 1.95 (H) 12/10/2022 03:47 PM    BUN 24 (H) 12/10/2022 03:47 PM    GLUCOSE 135 (H) 12/10/2022 03:47 PM    GLUCOSE 107 02/28/2022 03:42 PM     No results found for: PROBNP  Lab Results   Component Value Date/Time    .5 12/10/2022 06:19 AM    CRP 4.0 01/31/2022 10:13 AM    CRP 4.0 12/17/2021 08:25 PM     Lab Results   Component Value Date/Time    INR 1.1 12/09/2022 07:28 PM     No results for input(s): PROCAL in the last 72 hours. Rad:    CT ABDOMEN PELVIS W IV CONTRAST Additional Contrast? None    Result Date: 12/9/2022  EXAMINATION: CT OF THE ABDOMEN AND PELVIS WITH CONTRAST 12/9/2022 12:03 am TECHNIQUE: CT of the abdomen and pelvis was performed with the administration of intravenous contrast. Multiplanar reformatted images are provided for review. Automated exposure control, iterative reconstruction, and/or weight based adjustment of the mA/kV was utilized to reduce the radiation dose to as low as reasonably achievable. COMPARISON: 05/23/2022 HISTORY: ORDERING SYSTEM PROVIDED HISTORY: acute onset abdomen and back pain TECHNOLOGIST PROVIDED HISTORY: acute onset abdomen and back pain Decision Support Exception - unselect if not a suspected or confirmed emergency medical condition->Emergency Medical Condition (MA) Reason for Exam: acute onset abdomen and back pain FINDINGS: Lower Chest: Lung bases are clear. Mild dependent atelectasis. The heart size is normal.  Tiny hiatal hernia. Organs:  There is 14 mm gallstone in the region of the gallbladder neck (axial image 54, coronal image 44). This faintly rim calcified gallstone was previous located in the body the gallbladder on the study of 05/23/2022. The gallbladder is moderately dilated. No pericholecystic fluid. There is minimal dilatation of intrahepatic bile ducts. There is a small periampullary duodenal diverticulum. The liver, spleen and pancreas are grossly normal.  There are bilateral renal cysts measuring up to 9.5 cm in the right kidney. There is mild nodular hypertrophy of bilateral adrenal glands, left greater than right, unchanged. GI/Bowel: Few scattered sigmoid colon diverticula. No evidence of diverticulitis. No bowel obstruction. The appendix is. Pelvis: Urinary bladder is grossly normal.  There is artifact from a left hip arthroplasty degrading images through the lower pelvis. Mild prostatomegaly. Peritoneum/Retroperitoneum: There is no adenopathy, free air or free fluid. No abdominal aortic aneurysm. Bones/Soft Tissues: There is no acute bone finding. Prominent anterior spondylosis in the lower thoracic and lumbar spine. There is a 14 mm faintly rim calcified gallstone impacted in the gallbladder neck. This gallstone was located in the body of the gallbladder on 05/23/2022. No other acute finding in the abdomen or pelvis. US ABDOMEN LIMITED Specify organ? GALLBLADDER    Result Date: 12/9/2022  EXAMINATION: RIGHT UPPER QUADRANT ULTRASOUND 12/9/2022 7:35 am COMPARISON: None. HISTORY: ORDERING SYSTEM PROVIDED HISTORY: RUQ Pain TECHNOLOGIST PROVIDED HISTORY: RUQ Pain Specify organ?->GALLBLADDER FINDINGS: Patient body habitus limits the study, as there is increased attenuation of the ultrasound beam. This decreases sensitivity and specificity. LIVER:  No intrahepatic ductal dilatation. No perihepatic fluid BILIARY SYSTEM:  Stones are seen within the gallbladder. No sonographic Gimenez sign. No pericholecystic fluid.  Common bile duct measures 9 mm. RIGHT KIDNEY: No hydronephrosis noted. Cysts are seen. PANCREAS:  Visualized portions of the pancreas are unremarkable. OTHER: No evidence of right upper quadrant ascites. Cholelithiasis. No cholecystitis. A:  Active Hospital Problems    Diagnosis Date Noted    Gallstone pancreatitis [K85.10] 12/10/2022     Priority: Medium    Impacted gallstone of gallbladder [K80.20] 12/09/2022     Priority: Medium    Dysmetabolic syndrome [D12.54]     Myasthenia gravis ([facility] [address] [G70.00] 06/07/2013    Benign prostatic hyperplasia [N40.0] 06/07/2013    Seizure disorder ([facility] [address] [G40.909] 06/07/2013    Hypertension [I10]     Chronic kidney disease, stage III (moderate) ([facility] [address] [N18.30]            P:  1. As well as this patient's gallstone issues his gallbladder has been removed he is doing pretty well in general surgery is managing any antibiotics. 2.  As far as his myasthenia gravis he has been dosed with steroids neurology had been managing this for us. 3.  Patient's chronic kidney disease shows a creatinine that is slightly increased from prior we will get some labs today reevaluate the patient he may need some hydration. Tucker Acosta DO  12:40 PM  12/11/2022    This note was created with the assistance of a speech-recognition program. Although the intention is to generate a document that actually reflects the content of the visit, no guarantees can be provided that every mistake has been identified and corrected by editing.
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
## 2022-12-12 LAB
ABSOLUTE EOS #: 0.04 K/UL (ref 0–0.44)
ABSOLUTE IMMATURE GRANULOCYTE: 0.13 K/UL (ref 0–0.3)
ABSOLUTE LYMPH #: 1 K/UL (ref 1.1–3.7)
ABSOLUTE MONO #: 1.19 K/UL (ref 0.1–1.2)
ANION GAP SERPL CALCULATED.3IONS-SCNC: 10 MMOL/L (ref 9–17)
BASOPHILS # BLD: 1 % (ref 0–2)
BASOPHILS ABSOLUTE: 0.07 K/UL (ref 0–0.2)
BUN BLDV-MCNC: 38 MG/DL (ref 8–23)
CALCIUM SERPL-MCNC: 8.2 MG/DL (ref 8.6–10.4)
CHLORIDE BLD-SCNC: 105 MMOL/L (ref 98–107)
CO2: 20 MMOL/L (ref 20–31)
CREAT SERPL-MCNC: 1.66 MG/DL (ref 0.7–1.2)
EOSINOPHILS RELATIVE PERCENT: 0 % (ref 1–4)
GFR SERPL CREATININE-BSD FRML MDRD: 44 ML/MIN/1.73M2
GLUCOSE BLD-MCNC: 90 MG/DL (ref 70–99)
HCT VFR BLD CALC: 37.7 % (ref 40.7–50.3)
HEMOGLOBIN: 11.8 G/DL (ref 13–17)
IMMATURE GRANULOCYTES: 1 %
LYMPHOCYTES # BLD: 7 % (ref 24–43)
MAGNESIUM: 2.1 MG/DL (ref 1.6–2.6)
MCH RBC QN AUTO: 27.4 PG (ref 25.2–33.5)
MCHC RBC AUTO-ENTMCNC: 31.3 G/DL (ref 28.4–34.8)
MCV RBC AUTO: 87.7 FL (ref 82.6–102.9)
MONOCYTES # BLD: 8 % (ref 3–12)
NRBC AUTOMATED: 0 PER 100 WBC
PDW BLD-RTO: 14.3 % (ref 11.8–14.4)
PLATELET # BLD: 222 K/UL (ref 138–453)
PMV BLD AUTO: 11.6 FL (ref 8.1–13.5)
POTASSIUM SERPL-SCNC: 4.1 MMOL/L (ref 3.7–5.3)
RBC # BLD: 4.3 M/UL (ref 4.21–5.77)
SEG NEUTROPHILS: 83 % (ref 36–65)
SEGMENTED NEUTROPHILS ABSOLUTE COUNT: 12.44 K/UL (ref 1.5–8.1)
SODIUM BLD-SCNC: 135 MMOL/L (ref 135–144)
WBC # BLD: 14.9 K/UL (ref 3.5–11.3)

## 2022-12-12 PROCEDURE — 83735 ASSAY OF MAGNESIUM: CPT

## 2022-12-12 PROCEDURE — 2580000003 HC RX 258

## 2022-12-12 PROCEDURE — 36415 COLL VENOUS BLD VENIPUNCTURE: CPT

## 2022-12-12 PROCEDURE — 85025 COMPLETE CBC W/AUTO DIFF WBC: CPT

## 2022-12-12 PROCEDURE — 80048 BASIC METABOLIC PNL TOTAL CA: CPT

## 2022-12-12 PROCEDURE — 6360000002 HC RX W HCPCS

## 2022-12-12 PROCEDURE — 6370000000 HC RX 637 (ALT 250 FOR IP)

## 2022-12-12 PROCEDURE — 1200000000 HC SEMI PRIVATE

## 2022-12-12 RX ADMIN — SODIUM CHLORIDE, PRESERVATIVE FREE 10 ML: 5 INJECTION INTRAVENOUS at 09:00

## 2022-12-12 RX ADMIN — PYRIDOSTIGMINE BROMIDE 60 MG: 60 TABLET ORAL at 12:12

## 2022-12-12 RX ADMIN — METRONIDAZOLE 500 MG: 500 TABLET, FILM COATED ORAL at 08:59

## 2022-12-12 RX ADMIN — DIVALPROEX SODIUM 125 MG: 125 TABLET, DELAYED RELEASE ORAL at 08:59

## 2022-12-12 RX ADMIN — CEFTRIAXONE SODIUM 1000 MG: 10 INJECTION, POWDER, FOR SOLUTION INTRAVENOUS at 17:07

## 2022-12-12 RX ADMIN — METOPROLOL SUCCINATE 50 MG: 50 TABLET, FILM COATED, EXTENDED RELEASE ORAL at 08:59

## 2022-12-12 RX ADMIN — ENOXAPARIN SODIUM 40 MG: 100 INJECTION SUBCUTANEOUS at 09:00

## 2022-12-12 RX ADMIN — GABAPENTIN 100 MG: 100 CAPSULE ORAL at 09:00

## 2022-12-12 RX ADMIN — PYRIDOSTIGMINE BROMIDE 60 MG: 60 TABLET ORAL at 23:13

## 2022-12-12 RX ADMIN — ASPIRIN 81 MG: 81 TABLET, CHEWABLE ORAL at 09:00

## 2022-12-12 RX ADMIN — METHOCARBAMOL TABLETS 750 MG: 750 TABLET, COATED ORAL at 08:59

## 2022-12-12 RX ADMIN — DIVALPROEX SODIUM 125 MG: 125 TABLET, DELAYED RELEASE ORAL at 19:52

## 2022-12-12 RX ADMIN — METRONIDAZOLE 500 MG: 500 TABLET, FILM COATED ORAL at 02:06

## 2022-12-12 RX ADMIN — GABAPENTIN 100 MG: 100 CAPSULE ORAL at 19:52

## 2022-12-12 RX ADMIN — AMLODIPINE BESYLATE 5 MG: 5 TABLET ORAL at 09:00

## 2022-12-12 RX ADMIN — PANTOPRAZOLE SODIUM 40 MG: 40 TABLET, DELAYED RELEASE ORAL at 08:59

## 2022-12-12 RX ADMIN — PYRIDOSTIGMINE BROMIDE 60 MG: 60 TABLET ORAL at 06:57

## 2022-12-12 RX ADMIN — Medication 1 TABLET: at 08:59

## 2022-12-12 RX ADMIN — METHOCARBAMOL TABLETS 750 MG: 750 TABLET, COATED ORAL at 14:18

## 2022-12-12 RX ADMIN — PYRIDOSTIGMINE BROMIDE 60 MG: 60 TABLET ORAL at 17:06

## 2022-12-12 RX ADMIN — METRONIDAZOLE 500 MG: 500 TABLET, FILM COATED ORAL at 17:06

## 2022-12-12 RX ADMIN — GABAPENTIN 100 MG: 100 CAPSULE ORAL at 14:18

## 2022-12-12 RX ADMIN — SODIUM CHLORIDE, PRESERVATIVE FREE 10 ML: 5 INJECTION INTRAVENOUS at 19:52

## 2022-12-12 RX ADMIN — METHOCARBAMOL TABLETS 750 MG: 750 TABLET, COATED ORAL at 18:22

## 2022-12-12 RX ADMIN — METHOCARBAMOL TABLETS 750 MG: 750 TABLET, COATED ORAL at 19:52

## 2022-12-12 ASSESSMENT — PAIN DESCRIPTION - ORIENTATION: ORIENTATION: LOWER;MID

## 2022-12-12 ASSESSMENT — PAIN SCALES - GENERAL
PAINLEVEL_OUTOF10: 4
PAINLEVEL_OUTOF10: 3

## 2022-12-12 ASSESSMENT — PAIN SCALES - WONG BAKER
WONGBAKER_NUMERICALRESPONSE: 0

## 2022-12-12 ASSESSMENT — PAIN DESCRIPTION - FREQUENCY: FREQUENCY: INTERMITTENT

## 2022-12-12 ASSESSMENT — PAIN DESCRIPTION - DESCRIPTORS: DESCRIPTORS: CRAMPING;DISCOMFORT

## 2022-12-12 ASSESSMENT — PAIN DESCRIPTION - ONSET: ONSET: ON-GOING

## 2022-12-12 ASSESSMENT — PAIN DESCRIPTION - LOCATION: LOCATION: ABDOMEN

## 2022-12-12 ASSESSMENT — PAIN DESCRIPTION - PAIN TYPE: TYPE: ACUTE PAIN

## 2022-12-12 ASSESSMENT — PAIN - FUNCTIONAL ASSESSMENT: PAIN_FUNCTIONAL_ASSESSMENT: ACTIVITIES ARE NOT PREVENTED

## 2022-12-12 NOTE — PLAN OF CARE
Problem: Discharge Planning  Goal: Discharge to home or other facility with appropriate resources  12/12/2022 0104 by Vianca Messer RN  Outcome: Progressing  12/11/2022 1834 by Suzann Mortimer, RN  Outcome: Progressing     Problem: Pain  Goal: Verbalizes/displays adequate comfort level or baseline comfort level  12/12/2022 0104 by Vianca Messer RN  Outcome: Progressing  12/11/2022 1834 by Suzann Mortimer, RN  Outcome: Progressing     Problem: Skin/Tissue Integrity  Goal: Absence of new skin breakdown  Description: 1. Monitor for areas of redness and/or skin breakdown  2. Assess vascular access sites hourly  3. Every 4-6 hours minimum:  Change oxygen saturation probe site  4. Every 4-6 hours:  If on nasal continuous positive airway pressure, respiratory therapy assess nares and determine need for appliance change or resting period.   12/12/2022 0104 by Vianca Messer RN  Outcome: Progressing  12/11/2022 1834 by Suzann Mortimer, RN  Outcome: Progressing     Problem: Safety - Adult  Goal: Free from fall injury  12/12/2022 0104 by Vianca Messer RN  Outcome: Progressing  12/11/2022 1834 by Suzann Mortimer, RN  Outcome: Progressing     Problem: Chronic Conditions and Co-morbidities  Goal: Patient's chronic conditions and co-morbidity symptoms are monitored and maintained or improved  12/12/2022 0104 by Vianca Messer RN  Outcome: Progressing  12/11/2022 1834 by Suzann Mortimer, RN  Outcome: Progressing
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
## 2022-12-12 NOTE — PLAN OF CARE
Problem: Discharge Planning  Goal: Discharge to home or other facility with appropriate resources  12/12/2022 1422 by Isaac Anderson RN  Outcome: Progressing  12/12/2022 0104 by Rohit Loving RN  Outcome: Progressing     Problem: Pain  Goal: Verbalizes/displays adequate comfort level or baseline comfort level  12/12/2022 1422 by Isaac Anderson RN  Outcome: Progressing  12/12/2022 0104 by Rohit Loving RN  Outcome: Progressing     Problem: Skin/Tissue Integrity  Goal: Absence of new skin breakdown  Description: 1. Monitor for areas of redness and/or skin breakdown  2. Assess vascular access sites hourly  3. Every 4-6 hours minimum:  Change oxygen saturation probe site  4. Every 4-6 hours:  If on nasal continuous positive airway pressure, respiratory therapy assess nares and determine need for appliance change or resting period.   12/12/2022 1422 by Isaac Anderson RN  Outcome: Progressing  12/12/2022 0104 by Rohit Loving RN  Outcome: Progressing     Problem: Safety - Adult  Goal: Free from fall injury  12/12/2022 1422 by Isaac Anderson RN  Outcome: Progressing  12/12/2022 0104 by Rohit Loving RN  Outcome: Progressing     Problem: Chronic Conditions and Co-morbidities  Goal: Patient's chronic conditions and co-morbidity symptoms are monitored and maintained or improved  12/12/2022 1422 by Isaac Anderson RN  Outcome: Progressing  12/12/2022 0104 by Rohit Loving RN  Outcome: Progressing     Problem: ABCDS Injury Assessment  Goal: Absence of physical injury  Outcome: Progressing

## 2022-12-12 NOTE — PROGRESS NOTES
Progress Note(Hospital)    [facility] RENAL//MED SURG     Admition Status:      CC:Abdominal Pain and Back Pain      From [facility]:Rev    HCC:  PT doing fair. Not eating well. Not ambulating well. Rev labs with daughter and pt.             Current Facility-Administered Medications:     enoxaparin (LOVENOX) injection 40 mg, 40 mg, SubCUTAneous, Daily, Mike Niece, DO, 40 mg at 12/12/22 0900    cefTRIAXone (ROCEPHIN) 1,000 mg in sterile water 10 mL IV syringe, 1,000 mg, IntraVENous, Q24H, Mike Niece, DO, 1,000 mg at 12/11/22 1642    metroNIDAZOLE (FLAGYL) tablet 500 mg, 500 mg, Oral, 3 times per day, Mike Niece, DO, 500 mg at 12/12/22 4435    gabapentin (NEURONTIN) capsule 100 mg, 100 mg, Oral, TID, Polina Anabaptism, DO, 100 mg at 12/12/22 0900    methocarbamol (ROBAXIN) tablet 750 mg, 750 mg, Oral, 4x Daily, Polina Anabaptism, DO, 750 mg at 12/12/22 2583    sodium chloride flush 0.9 % injection 5-40 mL, 5-40 mL, IntraVENous, 2 times per day, Mike Niece, DO, 10 mL at 12/12/22 0900    sodium chloride flush 0.9 % injection 5-40 mL, 5-40 mL, IntraVENous, PRN, Mike Niece, DO    0.9 % sodium chloride infusion, , IntraVENous, PRN, Mike Niece, DO    acetaminophen (TYLENOL) tablet 650 mg, 650 mg, Oral, Q4H PRN, Mike Niece, DO    ondansetron (ZOFRAN-ODT) disintegrating tablet 4 mg, 4 mg, Oral, Q8H PRN **OR** ondansetron (ZOFRAN) injection 4 mg, 4 mg, IntraVENous, Q6H PRN, Mike Niece, DO, 4 mg at 12/09/22 1343    pyridostigmine (MESTINON) tablet 60 mg, 60 mg, Oral, 5x Daily, Mike Niece, DO, 60 mg at 12/12/22 1212    aspirin chewable tablet 81 mg, 81 mg, Oral, Daily, Mike Niece, DO, 81 mg at 12/12/22 0900    divalproex (DEPAKOTE) DR tablet 125 mg, 125 mg, Oral, BID, Mike Tamez DO, 125 mg at 12/12/22 0859    metoprolol succinate (TOPROL XL) extended release tablet 50 mg, 50 mg, Oral, Daily, Mike Tamez DO, 50 mg at 12/12/22 0859    pantoprazole (PROTONIX) tablet 40 mg, 40 mg, Oral, Daily, Maya Mckusick, DO, 40 mg at 12/12/22 1718    therapeutic multivitamin-minerals 1 tablet, 1 tablet, Oral, Daily, Maya Mckusick, DO, 1 tablet at 12/12/22 0859    amLODIPine (NORVASC) tablet 5 mg, 5 mg, Oral, Daily, Maya Mckusick, DO, 5 mg at 12/12/22 0900    [COMPLETED] Saline lock IV, , , Continuous **AND** sodium chloride flush 0.9 % injection 3 mL, 3 mL, IntraVENous, Q8H, Maya Mckusick, DO, 3 mL at 12/10/22 0607    O:  /75   Pulse 94   Temp 98.2 °F (36.8 °C) (Oral)   Resp 16   Ht 5' 9\" (1.753 m)   Wt 165 lb (74.8 kg)   SpO2 97%   BMI 24.37 kg/m²     Intake/Output Summary (Last 24 hours) at 12/12/2022 1341  Last data filed at 12/12/2022 1204  Gross per 24 hour   Intake --   Output 2220 ml   Net -2220 ml       Physical Exam  Constitutional:       General: He is not in acute distress. Appearance: He is well-developed. HENT:      Head: Normocephalic and atraumatic. Mouth/Throat:      Mouth: Mucous membranes are moist.   Eyes:      Pupils: Pupils are equal, round, and reactive to light. Neck:      Vascular: No JVD. Cardiovascular:      Rate and Rhythm: Normal rate and regular rhythm. Heart sounds: No murmur heard. No friction rub. Pulmonary:      Effort: Pulmonary effort is normal. No respiratory distress. Breath sounds: Normal breath sounds. No stridor. Abdominal:      General: Bowel sounds are normal.      Palpations: Abdomen is soft. Genitourinary:     Comments: No Examined  Skin:     General: Skin is warm and dry. Neurological:      General: No focal deficit present. Mental Status: He is oriented to person, place, and time. Psychiatric:         Mood and Affect: Mood normal.         Behavior: Behavior normal.         Thought Content:  Thought content normal.         Judgment: Judgment normal.       Labs:      Lab Results   Component Value Date/Time    WBC 14.9 (H) 12/12/2022 07:15 AM    HGB 11.8 (L) 12/12/2022 07:15 AM    HCT 37.7 (L) 12/12/2022 07:15 AM     12/12/2022 07:15 AM    NEUTROABS 12.44 (H) 12/12/2022 07:15 AM     Lab Results   Component Value Date/Time     12/12/2022 07:15 AM    K 4.1 12/12/2022 07:15 AM     12/12/2022 07:15 AM    CREATININE 1.66 (H) 12/12/2022 07:15 AM    BUN 38 (H) 12/12/2022 07:15 AM    GLUCOSE 90 12/12/2022 07:15 AM    GLUCOSE 107 02/28/2022 03:42 PM     No results found for: PROBNP  Lab Results   Component Value Date/Time    .5 12/10/2022 06:19 AM    CRP 4.0 01/31/2022 10:13 AM    CRP 4.0 12/17/2021 08:25 PM     Lab Results   Component Value Date/Time    INR 1.1 12/09/2022 07:28 PM     No results for input(s): PROCAL in the last 72 hours. Rad:    CT ABDOMEN PELVIS W IV CONTRAST Additional Contrast? None    Result Date: 12/9/2022  EXAMINATION: CT OF THE ABDOMEN AND PELVIS WITH CONTRAST 12/9/2022 12:03 am TECHNIQUE: CT of the abdomen and pelvis was performed with the administration of intravenous contrast. Multiplanar reformatted images are provided for review. Automated exposure control, iterative reconstruction, and/or weight based adjustment of the mA/kV was utilized to reduce the radiation dose to as low as reasonably achievable. COMPARISON: 05/23/2022 HISTORY: ORDERING SYSTEM PROVIDED HISTORY: acute onset abdomen and back pain TECHNOLOGIST PROVIDED HISTORY: acute onset abdomen and back pain Decision Support Exception - unselect if not a suspected or confirmed emergency medical condition->Emergency Medical Condition (MA) Reason for Exam: acute onset abdomen and back pain FINDINGS: Lower Chest: Lung bases are clear. Mild dependent atelectasis. The heart size is normal.  Tiny hiatal hernia. Organs: There is 14 mm gallstone in the region of the gallbladder neck (axial image 54, coronal image 44). This faintly rim calcified gallstone was previous located in the body the gallbladder on the study of 05/23/2022. The gallbladder is moderately dilated. No pericholecystic fluid.   There is minimal dilatation of intrahepatic bile ducts. There is a small periampullary duodenal diverticulum. The liver, spleen and pancreas are grossly normal.  There are bilateral renal cysts measuring up to 9.5 cm in the right kidney. There is mild nodular hypertrophy of bilateral adrenal glands, left greater than right, unchanged. GI/Bowel: Few scattered sigmoid colon diverticula. No evidence of diverticulitis. No bowel obstruction. The appendix is. Pelvis: Urinary bladder is grossly normal.  There is artifact from a left hip arthroplasty degrading images through the lower pelvis. Mild prostatomegaly. Peritoneum/Retroperitoneum: There is no adenopathy, free air or free fluid. No abdominal aortic aneurysm. Bones/Soft Tissues: There is no acute bone finding. Prominent anterior spondylosis in the lower thoracic and lumbar spine. There is a 14 mm faintly rim calcified gallstone impacted in the gallbladder neck. This gallstone was located in the body of the gallbladder on 05/23/2022. No other acute finding in the abdomen or pelvis. US ABDOMEN LIMITED Specify organ? GALLBLADDER    Result Date: 12/9/2022  EXAMINATION: RIGHT UPPER QUADRANT ULTRASOUND 12/9/2022 7:35 am COMPARISON: None. HISTORY: ORDERING SYSTEM PROVIDED HISTORY: RUQ Pain TECHNOLOGIST PROVIDED HISTORY: RUQ Pain Specify organ?->GALLBLADDER FINDINGS: Patient body habitus limits the study, as there is increased attenuation of the ultrasound beam. This decreases sensitivity and specificity. LIVER:  No intrahepatic ductal dilatation. No perihepatic fluid BILIARY SYSTEM:  Stones are seen within the gallbladder. No sonographic Gimenez sign. No pericholecystic fluid. Common bile duct measures 9 mm. RIGHT KIDNEY: No hydronephrosis noted. Cysts are seen. PANCREAS:  Visualized portions of the pancreas are unremarkable. OTHER: No evidence of right upper quadrant ascites. Cholelithiasis. No cholecystitis.          A:  Active Hospital Problems Diagnosis Date Noted    Gallstone pancreatitis [K85.10] 12/10/2022     Priority: Medium    Impacted gallstone of gallbladder [K80.20] 12/09/2022     Priority: Medium    Dysmetabolic syndrome [P87.34]     Myasthenia gravis ([facility] [G70.00] 06/07/2013    Benign prostatic hyperplasia [N40.0] 06/07/2013    Seizure disorder ([facility] [I08.109] 06/07/2013    Hypertension [I10]     Chronic kidney disease, stage III (moderate) ([facility] [N18.30]            P:  Pt doing well. Will get PT/OT eval and treat. DC IV and push fluids. CKD 3 will be monitored off IV . Abx dc at discharge. Drucie Saint Neverauskas, DO  1:41 PM  12/12/2022    This note was created with the assistance of a speech-recognition program. Although the intention is to generate a document that actually reflects the content of the visit, no guarantees can be provided that every mistake has been identified and corrected by editing.

## 2022-12-13 VITALS
SYSTOLIC BLOOD PRESSURE: 141 MMHG | BODY MASS INDEX: 24.44 KG/M2 | HEART RATE: 85 BPM | HEIGHT: 69 IN | WEIGHT: 165 LBS | RESPIRATION RATE: 16 BRPM | DIASTOLIC BLOOD PRESSURE: 75 MMHG | TEMPERATURE: 98.4 F | OXYGEN SATURATION: 95 %

## 2022-12-13 LAB
ABSOLUTE EOS #: 0.09 K/UL (ref 0–0.44)
ABSOLUTE IMMATURE GRANULOCYTE: 0.06 K/UL (ref 0–0.3)
ABSOLUTE LYMPH #: 0.85 K/UL (ref 1.1–3.7)
ABSOLUTE MONO #: 1.21 K/UL (ref 0.1–1.2)
ANION GAP SERPL CALCULATED.3IONS-SCNC: 9 MMOL/L (ref 9–17)
BASOPHILS # BLD: 1 % (ref 0–2)
BASOPHILS ABSOLUTE: 0.06 K/UL (ref 0–0.2)
BUN BLDV-MCNC: 32 MG/DL (ref 8–23)
CALCIUM SERPL-MCNC: 8.4 MG/DL (ref 8.6–10.4)
CHLORIDE BLD-SCNC: 105 MMOL/L (ref 98–107)
CO2: 21 MMOL/L (ref 20–31)
CREAT SERPL-MCNC: 1.49 MG/DL (ref 0.7–1.2)
EOSINOPHILS RELATIVE PERCENT: 1 % (ref 1–4)
GFR SERPL CREATININE-BSD FRML MDRD: 50 ML/MIN/1.73M2
GLUCOSE BLD-MCNC: 95 MG/DL (ref 70–99)
HCT VFR BLD CALC: 40.1 % (ref 40.7–50.3)
HEMOGLOBIN: 12.9 G/DL (ref 13–17)
IMMATURE GRANULOCYTES: 1 %
LYMPHOCYTES # BLD: 8 % (ref 24–43)
MAGNESIUM: 2.2 MG/DL (ref 1.6–2.6)
MCH RBC QN AUTO: 28.5 PG (ref 25.2–33.5)
MCHC RBC AUTO-ENTMCNC: 32.2 G/DL (ref 28.4–34.8)
MCV RBC AUTO: 88.5 FL (ref 82.6–102.9)
MONOCYTES # BLD: 11 % (ref 3–12)
NRBC AUTOMATED: 0 PER 100 WBC
PDW BLD-RTO: 14.3 % (ref 11.8–14.4)
PLATELET # BLD: 342 K/UL (ref 138–453)
PMV BLD AUTO: 12.1 FL (ref 8.1–13.5)
POTASSIUM SERPL-SCNC: 4 MMOL/L (ref 3.7–5.3)
RBC # BLD: 4.53 M/UL (ref 4.21–5.77)
SEG NEUTROPHILS: 80 % (ref 36–65)
SEGMENTED NEUTROPHILS ABSOLUTE COUNT: 8.75 K/UL (ref 1.5–8.1)
SODIUM BLD-SCNC: 135 MMOL/L (ref 135–144)
SURGICAL PATHOLOGY REPORT: NORMAL
WBC # BLD: 11 K/UL (ref 3.5–11.3)

## 2022-12-13 PROCEDURE — 97162 PT EVAL MOD COMPLEX 30 MIN: CPT

## 2022-12-13 PROCEDURE — 6370000000 HC RX 637 (ALT 250 FOR IP)

## 2022-12-13 PROCEDURE — 85025 COMPLETE CBC W/AUTO DIFF WBC: CPT

## 2022-12-13 PROCEDURE — 2580000003 HC RX 258

## 2022-12-13 PROCEDURE — 80048 BASIC METABOLIC PNL TOTAL CA: CPT

## 2022-12-13 PROCEDURE — 97530 THERAPEUTIC ACTIVITIES: CPT

## 2022-12-13 PROCEDURE — 97116 GAIT TRAINING THERAPY: CPT

## 2022-12-13 PROCEDURE — 97166 OT EVAL MOD COMPLEX 45 MIN: CPT

## 2022-12-13 PROCEDURE — 36415 COLL VENOUS BLD VENIPUNCTURE: CPT

## 2022-12-13 PROCEDURE — 97112 NEUROMUSCULAR REEDUCATION: CPT

## 2022-12-13 PROCEDURE — 6360000002 HC RX W HCPCS

## 2022-12-13 PROCEDURE — 83735 ASSAY OF MAGNESIUM: CPT

## 2022-12-13 RX ORDER — METRONIDAZOLE 500 MG/1
500 TABLET ORAL EVERY 8 HOURS SCHEDULED
Qty: 30 TABLET | Refills: 0 | Status: SHIPPED | OUTPATIENT
Start: 2022-12-13 | End: 2022-12-23

## 2022-12-13 RX ORDER — ONDANSETRON 4 MG/1
4 TABLET, FILM COATED ORAL EVERY 8 HOURS PRN
Qty: 20 TABLET | Refills: 0 | Status: SHIPPED | OUTPATIENT
Start: 2022-12-13 | End: 2022-12-17

## 2022-12-13 RX ADMIN — PYRIDOSTIGMINE BROMIDE 60 MG: 60 TABLET ORAL at 15:59

## 2022-12-13 RX ADMIN — METRONIDAZOLE 500 MG: 500 TABLET, FILM COATED ORAL at 17:19

## 2022-12-13 RX ADMIN — METOPROLOL SUCCINATE 50 MG: 50 TABLET, FILM COATED, EXTENDED RELEASE ORAL at 09:28

## 2022-12-13 RX ADMIN — METRONIDAZOLE 500 MG: 500 TABLET, FILM COATED ORAL at 01:07

## 2022-12-13 RX ADMIN — DIVALPROEX SODIUM 125 MG: 125 TABLET, DELAYED RELEASE ORAL at 09:28

## 2022-12-13 RX ADMIN — AMLODIPINE BESYLATE 5 MG: 5 TABLET ORAL at 09:29

## 2022-12-13 RX ADMIN — SODIUM CHLORIDE, PRESERVATIVE FREE 10 ML: 5 INJECTION INTRAVENOUS at 09:29

## 2022-12-13 RX ADMIN — PYRIDOSTIGMINE BROMIDE 60 MG: 60 TABLET ORAL at 11:52

## 2022-12-13 RX ADMIN — Medication 1 TABLET: at 09:28

## 2022-12-13 RX ADMIN — ENOXAPARIN SODIUM 40 MG: 100 INJECTION SUBCUTANEOUS at 09:29

## 2022-12-13 RX ADMIN — METRONIDAZOLE 500 MG: 500 TABLET, FILM COATED ORAL at 09:28

## 2022-12-13 RX ADMIN — GABAPENTIN 100 MG: 100 CAPSULE ORAL at 14:31

## 2022-12-13 RX ADMIN — GABAPENTIN 100 MG: 100 CAPSULE ORAL at 09:28

## 2022-12-13 RX ADMIN — METHOCARBAMOL TABLETS 750 MG: 750 TABLET, COATED ORAL at 14:31

## 2022-12-13 RX ADMIN — ASPIRIN 81 MG: 81 TABLET, CHEWABLE ORAL at 09:29

## 2022-12-13 RX ADMIN — PANTOPRAZOLE SODIUM 40 MG: 40 TABLET, DELAYED RELEASE ORAL at 09:28

## 2022-12-13 RX ADMIN — METHOCARBAMOL TABLETS 750 MG: 750 TABLET, COATED ORAL at 09:28

## 2022-12-13 RX ADMIN — METHOCARBAMOL TABLETS 750 MG: 750 TABLET, COATED ORAL at 17:19

## 2022-12-13 RX ADMIN — PYRIDOSTIGMINE BROMIDE 60 MG: 60 TABLET ORAL at 06:18

## 2022-12-13 NOTE — PROGRESS NOTES
Physical Therapy  Facility/Department: [facility] RENAL//MED SURG  Physical Therapy Initial Assessment    Name: Elizabeth Castle  : 1950  MRN: [id number]  Date of Service: 2022  Chief Complaint   Patient presents with    Abdominal Pain    Back Pain       S/p robotic assisted laparoscopic cholecystectomy (12/10/22)  Discharge Recommendations:  Patient would benefit from continued therapy after discharge   PT Equipment Recommendations  Equipment Needed: No  Other: Pt has own personal cane      Patient Diagnosis(es): The primary encounter diagnosis was Gallstone pancreatitis. Diagnoses of Calculus of gallbladder with biliary obstruction but without cholecystitis and Acute cholecystitis were also pertinent to this visit. Past Medical History:  has a past medical history of Arthritis, Chronic kidney disease, Dysmetabolic syndrome, Hypertension, Iron deficiency anemia, unspecified, Movement disorder, Myasthenia gravis ([address] Proteinuria, and Seizures ([address] Past Surgical History:  has a past surgical history that includes Quadraceps tendon repair; knee surgery; Cholecystectomy (12/10/2022); and Cholecystectomy, laparoscopic (N/A, 12/10/2022). Assessment   Body Structures, Functions, Activity Limitations Requiring Skilled Therapeutic Intervention: Decreased functional mobility ; Decreased body mechanics; Decreased tolerance to work activity; Decreased endurance;Decreased balance  Assessment: Pt presetns with weakness, gait and balance deficits with pt reporting decline in baseline mobilty. Pt able to transfer and ambulate with cane and CGA will continue to benefit from PT intervention to progress activity and promote functional independence needed to regain prior level of abilty and prevent furhter functional decline.   Therapy Prognosis: Good  Decision Making: Medium Complexity  Clinical Presentation: evolving  Requires PT Follow-Up: Yes  Activity Tolerance  Activity Tolerance: Patient tolerated evaluation without incident     Plan   Physcial Therapy Plan  General Plan:  (5-6x/wk)  Current Treatment Recommendations: Strengthening, Balance training, Functional mobility training, Transfer training, Neuromuscular re-education, Stair training, Gait training, Patient/Caregiver education & training  Safety Devices  Type of Devices: All fall risk precautions in place, Chair alarm in place, Left in bed, Nurse notified  Restraints  Restraints Initially in Place: No     Restrictions  Restrictions/Precautions  Restrictions/Precautions: Up as Tolerated  Required Braces or Orthoses?: No     Subjective   General  Chart Reviewed:  Yes  Additional Pertinent Hx: S/p robotic assisted laparoscopic cholecystectomy (12/10/22)  Response To Previous Treatment: Not applicable  Follows Commands: Within Functional Limits  Other (Comment): Pt awake and alert in agreement with PT intervention  General Comment  Comments: okay per RN to see  Subjective  Subjective: Pt report right UE pain and abdominal pain grossly 3/5, not limiting activity         Social/Functional History  Social/Functional History  Lives With: Family (Brother is able to assist if needed available 24/7)  Type of Home: House  Home Layout: Two level  Home Access: Stairs to enter without rails  Entrance Stairs - Number of Steps: 2 POLLO + 7 steps to bed and bath  Bathroom Shower/Tub: Tub/Shower unit  Bathroom Toilet: Standard  Bathroom Equipment: Grab bars in shower, Shower chair  Bathroom Accessibility: Accessible  Home Equipment: Mellissa Costa  Has the patient had two or more falls in the past year or any fall with injury in the past year?: No (1 fall in May with resulted arm fx with nerve damage)  Receives Help From: Family  ADL Assistance: Independent  Homemaking Assistance: Independent  Homemaking Responsibilities: No  Ambulation Assistance: Independent  Transfer Assistance: Independent  Active : Yes  Mode of Transportation: [employer]  Occupation: Retired  Type of Occupation: physical labor  Leisure & Hobbies: grand children sporting events  Additional Comments: Pt report independent at baseline with brother able to assist as needed due to right UE weakness and debility, assist needed lacing shoes  Vision/Hearing  Vision  Vision: Within Functional Limits  Hearing  Hearing:  (Pt is very [place] in need of hearings, increased volume needed)    Cognition   Orientation  Overall Orientation Status: Within Normal Limits  Cognition  Overall Cognitive Status: WNL     Objective   Heart Rate: 85  Heart Rate Source: Monitor  BP: (!) 141/75  BP Location: Right upper arm  BP Method: Automatic  Patient Position: Semi fowlers  MAP (Calculated): 97  Resp: 16  SpO2: 95 %     Observation/Palpation  Posture: Good  Observation: Pt stands with erect posture, noted leg length descrepancy due to hip pathology at baseline  Scar: abdminal surgical incision  Gross Assessment  AROM: Within functional limits  PROM: Within functional limits  Strength:  Within functional limits  Coordination: Within functional limits  Tone: Normal  Sensation: Intact     AROM RLE (degrees)  RLE AROM: WFL  AROM LLE (degrees)  LLE AROM : WFL  AROM RUE (degrees)  RUE AROM : Exceptions  RUE General AROM: rigth UE pathology with nerve damage from injury, limited use and abilty  AROM LUE (degrees)  LUE AROM : WFL  Strength RLE  Strength RLE: WFL  Strength LLE  Strength LLE: WFL  Strength RUE  Strength RUE: Exception  Comment: unable to fully lift against gravity, limited use 2/2 pain and nerve injury  Strength LUE  Strength LUE: WFL  Strength Other  Other: Pt demonstrates general weakness and deconditioning           Bed mobility  Supine to Sit: Minimal assistance  Sit to Supine: Minimal assistance  Scooting: Stand by assistance  Bed Mobility Comments: Assist needed for supine <> sit  Transfers  Sit to Stand: Contact guard assistance  Stand to Sit: Contact guard assistance  Bed to Chair: Contact guard assistance  Stand Pivot Transfers: Contact guard assistance  Ambulation  Surface: Level tile  Device: Single point cane  Assistance: Contact guard assistance  Gait Deviations: Slow Shanta  Distance: 80 ft CGA  Comments: slow guraded gait, decreased step lenght pt report h/o hip replacement left length decrepancy and pain  More Ambulation?: No  Stairs/Curb  Stairs?: No  Wheelchair Activities  Wheelchair Parts Management: No  Propulsion: No     Balance  Posture: Good  Sitting - Static: Good  Sitting - Dynamic: Good  Standing - Static: Good;-  Standing - Dynamic: Fair;+  Comments: dynamic standing balance assessed with cane  Exercise Treatment: sitting EOB x 6 minutes, Pt educated on safety with mobility, hand and foot placement with cane and educaion of fall prevention                                                    AM-PAC Score  AM-PAC Inpatient Mobility Raw Score : 18 (12/13/22 1001)  AM-PAC Inpatient T-Scale Score : 43.63 (12/13/22 1001)  Mobility Inpatient CMS 0-100% Score: 46.58 (12/13/22 1001)  Mobility Inpatient CMS G-Code Modifier : CK (12/13/22 1001)               Goals  Short Term Goals  Time Frame for Short Term Goals: 10 visits  Short Term Goal 1: Pt to demonstrated indepenence with all bed mobiilty without railing  Short Term Goal 2: complete functional transfers from alternate height surfaces with use of cane mod I  Short Term Goal 3: Pt to ambualte 150 ft with cand SBA  Short Term Goal 4: Ascend/descend 6 steps left railing CGA  Short Term Goal 5: Pt to tolerate and participate in ther ex/act to improve endurance and activity tolerance. Patient Goals   Patient Goals : to return home       Education  Patient Education  Education Given To: Patient  Education Provided: Role of Therapy;Plan of Care;Precautions;Transfer Training; Fall Prevention Strategies  Education Provided Comments: education on fall prevention  Education Method: Verbal;Demonstration  Barriers to Learning: None  Education Outcome: Verbalized understanding;Continued education needed      Therapy Time   Individual Concurrent Group Co-treatment   Time In 0815         Time Out 0908         Minutes 53         Timed Code Treatment Minutes: [address] Anderson, PT, DPT

## 2022-12-13 NOTE — PLAN OF CARE
Problem: Discharge Planning  Goal: Discharge to home or other facility with appropriate resources  12/13/2022 0111 by Toya Spangler RN  Outcome: Progressing  12/12/2022 1422 by Christel Baltazar RN  Outcome: Progressing     Problem: Pain  Goal: Verbalizes/displays adequate comfort level or baseline comfort level  12/13/2022 0111 by Toya Spangler RN  Outcome: Progressing  12/12/2022 1422 by Christel Baltazar RN  Outcome: Progressing     Problem: Skin/Tissue Integrity  Goal: Absence of new skin breakdown  Description: 1. Monitor for areas of redness and/or skin breakdown  2. Assess vascular access sites hourly  3. Every 4-6 hours minimum:  Change oxygen saturation probe site  4. Every 4-6 hours:  If on nasal continuous positive airway pressure, respiratory therapy assess nares and determine need for appliance change or resting period.   12/13/2022 0111 by Toya Spangler RN  Outcome: Progressing  12/12/2022 1422 by Christel Baltazar RN  Outcome: Progressing     Problem: Safety - Adult  Goal: Free from fall injury  12/13/2022 0111 by Toya Spangler RN  Outcome: Progressing  12/12/2022 1422 by Christel Baltazar RN  Outcome: Progressing     Problem: Chronic Conditions and Co-morbidities  Goal: Patient's chronic conditions and co-morbidity symptoms are monitored and maintained or improved  12/13/2022 0111 by Toya Spangler RN  Outcome: Progressing  12/12/2022 1422 by Christel Baltazar RN  Outcome: Progressing     Problem: ABCDS Injury Assessment  Goal: Absence of physical injury  12/13/2022 0111 by Toya Spangler RN  Outcome: Progressing  12/12/2022 1422 by Christel Baltazar RN  Outcome: Progressing

## 2022-12-13 NOTE — DISCHARGE SUMMARY
Hospital Discharge Summary     Patient Identification  Laine Lozano is a 71 y.o. male. :  1950    Facility: [facility] RENAL//MED SURG     Admit Status:     Admit Date:  2022  Discharge date and time: No discharge date for patient encounter. Attending Provider: Skylar Pickering DO         Admission Diagnoses: Impacted gallstone of gallbladder [K80.20]  Gallstone pancreatitis [K85.10]  Calculus of gallbladder with biliary obstruction but without cholecystitis [K80.21]    Discharge Diagnoses: Impacted gallstone of gallbladder  Active Hospital Problems    Diagnosis Date Noted    Gallstone pancreatitis [K85.10] 12/10/2022     Priority: Medium    Impacted gallstone of gallbladder [K80.20] 2022     Priority: Medium    Dysmetabolic syndrome [M70.67]     Myasthenia gravis ([address] [G70.00] 2013    Benign prostatic hyperplasia [N40.0] 2013    Seizure disorder ([address] [G40.909] 2013    Hypertension [I10]     Chronic kidney disease, stage III (moderate) ([address] [N18.30]        Admission  Status:poor    Discharge Status: good    Indication for Admission: See H&P    Hospital Course: Per Progress note section. Reason For Admit: Cholecystitis    Treatments During Hospital Stay: This is 20-year-old  male who was admitted because of right upper quadrant pain. Unable to hold down any p.o. Was evaluated by general surgery and also GI consultation. GI did not feel anything needed to be added from their standpoint and did deferred further evaluation by general surgery because of the need for cholecystectomy. Surgery evaluated patient and felt that the patient would benefit from cholecystectomy but was planning outpatient treatment. Patient was evaluated by primary team and patient was still unable to hold down any p.o. so GI had been reconsulted for possible evaluation via ERCP. Since there was a possible impacted stone in the common duct.   GI had felt that the patient would be better served with cholecystectomy and has spoken with general surgery and general surgery came in for secondary reevaluation. At that time they determined the patient would benefit from cystectomy during this inpatient stay. Neurology was consulted due to the patient's myasthenia gravis being a problem postsurgically. Patient was evaluated and placed on a steroid burst.  Patient did well postoperatively was seen by physical therapy and home health was suggested. Patient was tolerating p.o. and was eventually discharged to home. Plans aAfter Distcharge: 1. Discharged home with home health care. 2.  She to follow-up with primary care service in January. Consults: cardiology, GI, and general surgery    Significant Diagnostic Studies: Reviewed results section    Lab Results   Component Value Date/Time    WBC 11.0 12/13/2022 05:42 AM    HGB 12.9 (L) 12/13/2022 05:42 AM    HCT 40.1 (L) 12/13/2022 05:42 AM     12/13/2022 05:42 AM    NEUTROABS 8.75 (H) 12/13/2022 05:42 AM     Lab Results   Component Value Date/Time     12/13/2022 05:42 AM    K 4.0 12/13/2022 05:42 AM     12/13/2022 05:42 AM    CREATININE 1.49 (H) 12/13/2022 05:42 AM    BUN 32 (H) 12/13/2022 05:42 AM    GLUCOSE 95 12/13/2022 05:42 AM    GLUCOSE 107 02/28/2022 03:42 PM     No results found for: PROBNP    CT ABDOMEN PELVIS W IV CONTRAST Additional Contrast? None    Result Date: 12/9/2022  EXAMINATION: CT OF THE ABDOMEN AND PELVIS WITH CONTRAST 12/9/2022 12:03 am TECHNIQUE: CT of the abdomen and pelvis was performed with the administration of intravenous contrast. Multiplanar reformatted images are provided for review. Automated exposure control, iterative reconstruction, and/or weight based adjustment of the mA/kV was utilized to reduce the radiation dose to as low as reasonably achievable.  COMPARISON: 05/23/2022 HISTORY: ORDERING SYSTEM PROVIDED HISTORY: acute onset abdomen and back pain TECHNOLOGIST PROVIDED HISTORY: acute onset abdomen and back pain Decision Support Exception - unselect if not a suspected or confirmed emergency medical condition->Emergency Medical Condition (MA) Reason for Exam: acute onset abdomen and back pain FINDINGS: Lower Chest: Lung bases are clear. Mild dependent atelectasis. The heart size is normal.  Tiny hiatal hernia. Organs: There is 14 mm gallstone in the region of the gallbladder neck (axial image 54, coronal image 44). This faintly rim calcified gallstone was previous located in the body the gallbladder on the study of 05/23/2022. The gallbladder is moderately dilated. No pericholecystic fluid. There is minimal dilatation of intrahepatic bile ducts. There is a small periampullary duodenal diverticulum. The liver, spleen and pancreas are grossly normal.  There are bilateral renal cysts measuring up to 9.5 cm in the right kidney. There is mild nodular hypertrophy of bilateral adrenal glands, left greater than right, unchanged. GI/Bowel: Few scattered sigmoid colon diverticula. No evidence of diverticulitis. No bowel obstruction. The appendix is. Pelvis: Urinary bladder is grossly normal.  There is artifact from a left hip arthroplasty degrading images through the lower pelvis. Mild prostatomegaly. Peritoneum/Retroperitoneum: There is no adenopathy, free air or free fluid. No abdominal aortic aneurysm. Bones/Soft Tissues: There is no acute bone finding. Prominent anterior spondylosis in the lower thoracic and lumbar spine. There is a 14 mm faintly rim calcified gallstone impacted in the gallbladder neck. This gallstone was located in the body of the gallbladder on 05/23/2022. No other acute finding in the abdomen or pelvis. US ABDOMEN LIMITED Specify organ? GALLBLADDER    Result Date: 12/9/2022  EXAMINATION: RIGHT UPPER QUADRANT ULTRASOUND 12/9/2022 7:35 am COMPARISON: None.  HISTORY: ORDERING SYSTEM PROVIDED HISTORY: RUQ Pain TECHNOLOGIST PROVIDED HISTORY: RUQ Pain Specify organ?->GALLBLADDER FINDINGS: Patient body habitus limits the study, as there is increased attenuation of the ultrasound beam. This decreases sensitivity and specificity. LIVER:  No intrahepatic ductal dilatation. No perihepatic fluid BILIARY SYSTEM:  Stones are seen within the gallbladder. No sonographic Gimenez sign. No pericholecystic fluid. Common bile duct measures 9 mm. RIGHT KIDNEY: No hydronephrosis noted. Cysts are seen. PANCREAS:  Visualized portions of the pancreas are unremarkable. OTHER: No evidence of right upper quadrant ascites. Cholelithiasis. No cholecystitis.        Treatments: IV hydration, antibiotics: ceftriaxone and metronidazole, steroids: solu-medrol, and surgery: Cholecystectomy    Discharge Exam:  Physical Exam  BP (!) 141/75   Pulse 85   Temp 98.4 °F (36.9 °C) (Oral)   Resp 16   Ht 5' 9\" (1.753 m)   Wt 165 lb (74.8 kg)   SpO2 95%   BMI 24.37 kg/m²     General Appearance:    Alert, cooperative, no distress, appears stated age   Head:    Normocephalic, without obvious abnormality, atraumatic   Eyes:    PERRL, conjunctiva/corneas clear, EOM's intact, fundi     benign, both eyes        Ears:    Normal TM's and external ear canals, both ears   Nose:   Nares normal, septum midline, mucosa normal, no drainage    or sinus tenderness   Throat:   Lips, mucosa, and tongue normal; teeth and gums normal   Neck:   Supple, symmetrical, trachea midline, no adenopathy;        thyroid:  No enlargement/tenderness/nodules; no carotid    bruit or JVD   Back:     Symmetric, no curvature, ROM normal, no CVA tenderness   Lungs:     Clear to auscultation bilaterally, respirations unlabored   Chest wall:    No tenderness or deformity   Heart:    Regular rate and rhythm, S1 and S2 normal, no murmur, rub   or gallop   Abdomen:     Soft, non-tender, bowel sounds active all four quadrants,     no masses, no organomegaly           Extremities:   Extremities normal, atraumatic, no cyanosis or edema Pulses:   2+ and symmetric all extremities   Skin:   Skin color, texture, turgor normal, no rashes or lesions   Lymph nodes:   Cervical, supraclavicular, and axillary nodes normal   Neurologic:   CNII-XII intact. Normal strength, sensation and reflexes       throughout       Disposition: home    Patient Instructions:      Medication List        START taking these medications      divalproex 125 MG DR tablet  Commonly known as: DEPAKOTE     metroNIDAZOLE 500 MG tablet  Commonly known as: FLAGYL  Take 1 tablet by mouth every 8 hours for 10 days     ondansetron 4 MG tablet  Commonly known as: Zofran  Take 1 tablet by mouth every 8 hours as needed for Nausea            CHANGE how you take these medications      amLODIPine 5 MG tablet  Commonly known as: NORVASC  Take 1 tablet by mouth daily for 14 days.   What changed: when to take this            CONTINUE taking these medications      acetaminophen 500 MG tablet  Commonly known as: TYLENOL  Take 2 tablets by mouth every 8 hours     aspirin 81 MG chewable tablet     metoprolol succinate 50 MG extended release tablet  Commonly known as: TOPROL XL     pantoprazole 40 MG tablet  Commonly known as: PROTONIX     pyridostigmine 60 MG tablet  Commonly known as: MESTINON     therapeutic multivitamin-minerals tablet            STOP taking these medications      ergocalciferol 1.25 MG (32887 UT) capsule  Commonly known as: Drisdol     metFORMIN 500 MG tablet  Commonly known as: GLUCOPHAGE     vitamin D 1.25 MG (04069 UT) Caps capsule  Commonly known as: ERGOCALCIFEROL               Where to Get Your Medications        These medications were sent to [facility] [id number] [place], [place] - [address] [address]      Phone: [phone]   metroNIDAZOLE 500 MG tablet  ondansetron 4 MG tablet          Medication List        START taking these medications      divalproex 125 MG DR tablet  Commonly known as: DEPAKOTE metroNIDAZOLE 500 MG tablet  Commonly known as: FLAGYL  Take 1 tablet by mouth every 8 hours for 10 days     ondansetron 4 MG tablet  Commonly known as: Zofran  Take 1 tablet by mouth every 8 hours as needed for Nausea            CHANGE how you take these medications      amLODIPine 5 MG tablet  Commonly known as: NORVASC  Take 1 tablet by mouth daily for 14 days. What changed: when to take this            CONTINUE taking these medications      acetaminophen 500 MG tablet  Commonly known as: TYLENOL  Take 2 tablets by mouth every 8 hours     aspirin 81 MG chewable tablet     metoprolol succinate 50 MG extended release tablet  Commonly known as: TOPROL XL     pantoprazole 40 MG tablet  Commonly known as: PROTONIX     pyridostigmine 60 MG tablet  Commonly known as: MESTINON     therapeutic multivitamin-minerals tablet            STOP taking these medications      ergocalciferol 1.25 MG (28762 UT) capsule  Commonly known as: Drisdol     metFORMIN 500 MG tablet  Commonly known as: GLUCOPHAGE     vitamin D 1.25 MG (82721 UT) Caps capsule  Commonly known as: ERGOCALCIFEROL               Where to Get Your Medications        These medications were sent to [facility] [id number] [place] - [place] - F [phone]  [address] [place], [place] [id number]      Phone: [phone]   metroNIDAZOLE 500 MG tablet  ondansetron 4 MG tablet           Activity: activity as tolerated    Diet: regular diet    Wound Care: keep wound clean and dry and as directed      Follow-up with Hayley Acosta DO in 3 weeks. Hayley Acosta DO  No discharge date for patient encounter. This note was created with the assistance of a speech-recognition program. Although the intention is to generate a document that actually reflects the content of the visit, no guarantees can be provided that every mistake has been identified and corrected by editing.
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
## 2022-12-13 NOTE — PLAN OF CARE
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
## 2022-12-20 ENCOUNTER — OFFICE VISIT (OUTPATIENT)
Dept: SURGERY | Age: 72
End: 2022-12-20

## 2022-12-20 VITALS
SYSTOLIC BLOOD PRESSURE: 140 MMHG | HEIGHT: 69 IN | HEART RATE: 79 BPM | BODY MASS INDEX: 24.44 KG/M2 | WEIGHT: 165 LBS | DIASTOLIC BLOOD PRESSURE: 77 MMHG

## 2022-12-20 DIAGNOSIS — Z90.49 STATUS POST CHOLECYSTECTOMY: Primary | ICD-10-CM

## 2022-12-20 PROCEDURE — 99024 POSTOP FOLLOW-UP VISIT: CPT | Performed by: SPECIALIST
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
## 2023-05-06 ENCOUNTER — HOSPITAL ENCOUNTER (EMERGENCY)
Age: 73
Discharge: HOME | End: 2023-05-06
Attending: EMERGENCY MEDICINE
Payer: COMMERCIAL

## 2023-05-06 ENCOUNTER — APPOINTMENT (OUTPATIENT)
Dept: GENERAL RADIOLOGY | Age: 73
End: 2023-05-06
Payer: COMMERCIAL

## 2023-05-06 ENCOUNTER — APPOINTMENT (OUTPATIENT)
Dept: CT IMAGING | Age: 73
End: 2023-05-06
Payer: COMMERCIAL

## 2023-05-06 VITALS
OXYGEN SATURATION: 98 % | RESPIRATION RATE: 27 BRPM | TEMPERATURE: 98.4 F | HEART RATE: 81 BPM | DIASTOLIC BLOOD PRESSURE: 67 MMHG | SYSTOLIC BLOOD PRESSURE: 139 MMHG

## 2023-05-06 DIAGNOSIS — R56.9 SEIZURE (HCC): Primary | ICD-10-CM

## 2023-05-06 LAB
ABSOLUTE EOS #: 0 K/UL (ref 0–0.44)
ABSOLUTE IMMATURE GRANULOCYTE: 0.12 K/UL (ref 0–0.3)
ABSOLUTE LYMPH #: 0.58 K/UL (ref 1.1–3.7)
ABSOLUTE MONO #: 1.04 K/UL (ref 0.1–1.2)
ANION GAP SERPL CALCULATED.3IONS-SCNC: 14 MMOL/L (ref 9–17)
BASOPHILS # BLD: 0 % (ref 0–2)
BASOPHILS ABSOLUTE: 0 K/UL (ref 0–0.2)
BILIRUBIN URINE: NEGATIVE
BUN SERPL-MCNC: 25 MG/DL (ref 8–23)
CALCIUM SERPL-MCNC: 8.8 MG/DL (ref 8.6–10.4)
CASTS UA: NORMAL /LPF (ref 0–8)
CHLORIDE SERPL-SCNC: 105 MMOL/L (ref 98–107)
CO2 SERPL-SCNC: 16 MMOL/L (ref 20–31)
COLOR: YELLOW
CREAT SERPL-MCNC: 1.72 MG/DL (ref 0.7–1.2)
EOSINOPHILS RELATIVE PERCENT: 0 % (ref 1–4)
EPITHELIAL CELLS UA: NORMAL /HPF (ref 0–5)
GFR SERPL CREATININE-BSD FRML MDRD: 42 ML/MIN/1.73M2
GLUCOSE SERPL-MCNC: 165 MG/DL (ref 70–99)
GLUCOSE UR STRIP.AUTO-MCNC: NEGATIVE MG/DL
HCT VFR BLD AUTO: 43.9 % (ref 40.7–50.3)
HGB BLD-MCNC: 13.7 G/DL (ref 13–17)
IMMATURE GRANULOCYTES: 1 %
KETONES UR STRIP.AUTO-MCNC: NEGATIVE MG/DL
LEUKOCYTE ESTERASE UR QL STRIP.AUTO: NEGATIVE
LYMPHOCYTES # BLD: 5 % (ref 24–43)
MAGNESIUM SERPL-MCNC: 2.4 MG/DL (ref 1.6–2.6)
MCH RBC QN AUTO: 27.7 PG (ref 25.2–33.5)
MCHC RBC AUTO-ENTMCNC: 31.2 G/DL (ref 28.4–34.8)
MCV RBC AUTO: 88.7 FL (ref 82.6–102.9)
MONOCYTES # BLD: 9 % (ref 3–12)
MORPHOLOGY: NORMAL
NITRITE UR QL STRIP.AUTO: NEGATIVE
NRBC AUTOMATED: 0 PER 100 WBC
PDW BLD-RTO: 14.1 % (ref 11.8–14.4)
PLATELET # BLD AUTO: 259 K/UL (ref 138–453)
PMV BLD AUTO: 10.8 FL (ref 8.1–13.5)
POTASSIUM SERPL-SCNC: 4.8 MMOL/L (ref 3.7–5.3)
PROT UR STRIP.AUTO-MCNC: 6 MG/DL (ref 5–8)
PROT UR STRIP.AUTO-MCNC: ABNORMAL MG/DL
RBC # BLD: 4.95 M/UL (ref 4.21–5.77)
RBC CLUMPS #/AREA URNS AUTO: NORMAL /HPF (ref 0–4)
SEG NEUTROPHILS: 85 % (ref 36–65)
SEGMENTED NEUTROPHILS ABSOLUTE COUNT: 9.76 K/UL (ref 1.5–8.1)
SODIUM SERPL-SCNC: 135 MMOL/L (ref 135–144)
SPECIFIC GRAVITY UA: 1.01 (ref 1–1.03)
TURBIDITY: CLEAR
URINE HGB: ABNORMAL
UROBILINOGEN, URINE: NORMAL
WBC # BLD AUTO: 11.5 K/UL (ref 3.5–11.3)
WBC UA: NORMAL /HPF (ref 0–5)

## 2023-05-06 PROCEDURE — 96361 HYDRATE IV INFUSION ADD-ON: CPT

## 2023-05-06 PROCEDURE — 81001 URINALYSIS AUTO W/SCOPE: CPT

## 2023-05-06 PROCEDURE — 71046 X-RAY EXAM CHEST 2 VIEWS: CPT

## 2023-05-06 PROCEDURE — 96365 THER/PROPH/DIAG IV INF INIT: CPT

## 2023-05-06 PROCEDURE — 6360000002 HC RX W HCPCS: Performed by: EMERGENCY MEDICINE

## 2023-05-06 PROCEDURE — 85025 COMPLETE CBC W/AUTO DIFF WBC: CPT

## 2023-05-06 PROCEDURE — 80048 BASIC METABOLIC PNL TOTAL CA: CPT

## 2023-05-06 PROCEDURE — 2580000003 HC RX 258: Performed by: EMERGENCY MEDICINE

## 2023-05-06 PROCEDURE — 70450 CT HEAD/BRAIN W/O DYE: CPT

## 2023-05-06 PROCEDURE — 83735 ASSAY OF MAGNESIUM: CPT

## 2023-05-06 PROCEDURE — 93005 ELECTROCARDIOGRAM TRACING: CPT | Performed by: EMERGENCY MEDICINE

## 2023-05-06 PROCEDURE — 72125 CT NECK SPINE W/O DYE: CPT

## 2023-05-06 PROCEDURE — 99285 EMERGENCY DEPT VISIT HI MDM: CPT

## 2023-05-06 RX ORDER — LEVETIRACETAM 500 MG/1
500 TABLET ORAL 2 TIMES DAILY
Qty: 60 TABLET | Refills: 1 | Status: ON HOLD | OUTPATIENT
Start: 2023-05-06

## 2023-05-06 RX ORDER — LEVETIRACETAM 500 MG/1
500 TABLET ORAL 2 TIMES DAILY
Qty: 60 TABLET | Refills: 0 | Status: CANCELLED | OUTPATIENT
Start: 2023-05-06

## 2023-05-06 RX ORDER — LEVETIRACETAM 10 MG/ML
1000 INJECTION INTRAVASCULAR ONCE
Status: COMPLETED | OUTPATIENT
Start: 2023-05-06 | End: 2023-05-06

## 2023-05-06 RX ORDER — 0.9 % SODIUM CHLORIDE 0.9 %
1000 INTRAVENOUS SOLUTION INTRAVENOUS ONCE
Status: COMPLETED | OUTPATIENT
Start: 2023-05-06 | End: 2023-05-06

## 2023-05-06 RX ADMIN — LEVETIRACETAM 1000 MG: 10 INJECTION, SOLUTION INTRAVENOUS at 13:22

## 2023-05-06 RX ADMIN — SODIUM CHLORIDE 1000 ML: 9 INJECTION, SOLUTION INTRAVENOUS at 15:08

## 2023-05-06 ASSESSMENT — ENCOUNTER SYMPTOMS
ABDOMINAL PAIN: 0
SHORTNESS OF BREATH: 0

## 2023-05-06 NOTE — ED NOTES
Patient presents to the ED via [facility] with c/o seizure. Per EMS, patient had a seizure about an hour ago, unknown length of time, tonic-clonic in nature. Patient has a hx of seizures, does not take any medications. Per EMS, patient was lowered to the ground and did not hit his head. Seizure was witnessed by family member when he had slumped off of the chair at the dining room table. Patient reports he has not had a seizure in years. Patient is alert and oriented x4, answering questions appropriately. Patient is changed into a gown, placed on cardiac monitor, EKG done, IV established, will continue to monitor. Patient's side rails padded for seizure precautions.             Mak Hicks RN  05/06/23 3739

## 2023-05-06 NOTE — ED NOTES
The following labs were labeled with patient stickers & tubed to lab;    []Lavender   []On Ice  []Blue  []Green/ Yellow  []Green/ Black []On Ice  []Pink  []Red  []Yellow    []COVID-19 Swab []Rapid    [x]Urine Sample  []Pelvic Cultures    []Blood Cultures       Khadijah Turk, LPN  27/07/85 0170

## 2023-05-06 NOTE — ED PROVIDER NOTES
[facility]  ED  eMERGENCY dEPARTMENT eNCOUnter   Attending Attestation     Pt Name: Matheus Sen  MRN: [id number]  [place] 1950  Date of evaluation: 5/6/23       Matheus Sen is a 71 y.o. male who presents with Seizures      History: Patient presents with seizure. Patient has not had a seizure for over 10 years. Patient has had approximately 5 seizures since May started approximate 26 years ago. Daughter states that the first 1 they said was due to significant stress from working 7 days a week 80 hours a week. Patient was recently on Depakote but is not on Depakote any longer. Patient's been off approximately a year. Exam: Heart rate and rhythm are regular. Lungs are clear to auscultation bilaterally. Abdomen is soft, nontender. Will obtain work-up including labs, CT of the brain, \given new onset seizure after not having 1 for several years. Will discuss with neurology. Patient given Keppra on arrival.    EKG shows normal sinus rhythm with rate of 94 bpm.  Right bundle branch block noted. Left axis deviation. T waves are upright. No blocks arrhythmias. Nonspecific EKG. I performed a history and physical examination of the patient and discussed management with the resident. I reviewed the residents note and agree with the documented findings and plan of care. Any areas of disagreement are noted on the chart. I was personally present for the key portions of any procedures. I have documented in the chart those procedures where I was not present during the key portions. I have personally reviewed all images and agree with the resident's interpretation. I have reviewed the emergency nurses triage note. I agree with the chief complaint, past medical history, past surgical history, allergies, medications, social and family history as documented unless otherwise noted below.  Documentation of the HPI, Physical Exam and Medical Decision Making performed by medical students or

## 2023-05-06 NOTE — ED PROVIDER NOTES
Note Started: 5:13 PM EDT     Faculty Sign-Out Attestation  Handoff taken on the following patient from prior Attending Physician: Patrick Brooks    I was available and discussed any additional care issues that arose and coordinated the management plans with the resident(s) caring for the patient during my duty period. Any areas of disagreement with residents documentation of care or procedures are noted on the chart. I was personally present for the key portions of any/all procedures during my duty period. I have documented in the chart those procedures where I was not present during the key portions. 52-year-old male presenting with seizure. Has had 6 seizures in his lifetime, no longer on AEDs as of last year per neurology. Neurology has been consulted, recommended Keppra and discharge.     Javier Millan MD  Attending Physician        Javier Millan MD  05/06/23 8755

## 2023-05-06 NOTE — CONSULTS
[facility] Neurology   IN-PATIENT SERVICE      NEUROLOGY CONSULT  NOTE            Date:   5/6/2023  Patient name:  Archana Mcdermott  Date of admission:  5/6/2023  YOB: 1950      Chief Complaint:     Chief Complaint   Patient presents with    Seizures       Reason for Consult:      Breakthrough seizure    History of Present Illness: The patient is a 71 y.o. male who presents with Seizures  . The patient was seen and examined and the chart was reviewed. Patient with long history of seizures previously on Depakote being weaned off 2 years ago by his neurologist presents after breakthrough seizure earlier today witnessed by his brother. Patient states he has not had a seizure in over 1 to 2 years. He follows with Dr. Efraín Swain neurology who weaned him off of Depakote because he was doing so well. Also per chart review patient was also seen in our clinic back in 2016 and had been on Keppra at some point. He is unable to tell me why this was stopped or why he could not tolerate it. He was given a dose of Keppra here in the ED. He does have a mild CATHERINE, dehydration. CT head done showing no acute intracranial abnormalities. Patient also has history of myasthenia gravis on Mestinon, following with Dr. Efraín Swain neurology who is managing this. He states his myasthenia gravis symptoms have been stable.     Past Medical History:     Past Medical History:   Diagnosis Date    Arthritis     Chronic kidney disease     Dysmetabolic syndrome     Hypertension     Iron deficiency anemia, unspecified     Movement disorder     Myasthenia gravis ([address]     Proteinuria     Seizures ([address]         Past Surgical History:     Past Surgical History:   Procedure Laterality Date    CHOLECYSTECTOMY  12/10/2022    XI ROBOTIC LAPAROSCOPIC CHOLECYSTECTOMY, FIREFLY    CHOLECYSTECTOMY, LAPAROSCOPIC N/A 12/10/2022    XI ROBOTIC LAPAROSCOPIC CHOLECYSTECTOMY, FIREFLY performed by Izabela Meeks MD at [address]

## 2023-05-06 NOTE — ED NOTES
Report given to [facility], all questions asked and answered.       Grayson Hutton RN  05/06/23 2649

## 2023-05-06 NOTE — ED NOTES
Received report from [employer]. Pt on continuous monitor, does not appear in acute distress, RR even and unlabored, resting comfortably on stretcher with eyes closed and call light in reach.         Deny Poster, LPN  11/51/88 1659

## 2023-05-06 NOTE — DISCHARGE INSTRUCTIONS
You were seen here for a seizure. You were prescribed Keppra, this is an antiseizure medicine that you need to take twice daily. Since you had a seizure you cannot drive or operate heavy machinery until you are cleared by your neurologist.    Loretta Spicer need to call your neurologist Dr. Mikel Olvera and follow-up with him for soon as possible. Return to the emergency department immediately if you experience worsening symptoms including another seizure, headache, vision changes, weakness, persistent fever, chest pain, shortness of breath, or any other concerns.

## 2023-05-06 NOTE — ED PROVIDER NOTES
[facility]  ED  Emergency Department Encounter  Emergency Medicine Resident     Pt Name:Blake Lucas  MRN: [id number]  [place] 1950  Date of evaluation: 5/6/23  PCP:  Silvina Canela DO  Note Started: 12:52 PM EDT      CHIEF COMPLAINT       Chief Complaint   Patient presents with    Seizures       HISTORY OF PRESENT ILLNESS  (Location/Symptom, Timing/Onset, Context/Setting, Quality, Duration, Modifying Factors, Severity.)      Radha Hernández is a 71 y.o. male who presents with seizure. Patient was brought in by EMS. Patient was with his brother and was sitting at the dining room table when he had a tonic-clonic seizure. EMS stated that patient has a history of seizures however has not had one in several years therefore has not been on any antiepileptics. EMS stated the patient did not hit his head. EMS was unable to say how long the seizure lasted for. Patient is currently postictal but is able to deny any pain including headache, chest pain, shortness of breath, abdominal pain. PAST MEDICAL / SURGICAL / SOCIAL / FAMILY HISTORY      has a past medical history of Arthritis, Chronic kidney disease, Dysmetabolic syndrome, Hypertension, Iron deficiency anemia, unspecified, Movement disorder, Myasthenia gravis ([address] Proteinuria, and Seizures ([address] has a past surgical history that includes Quadraceps tendon repair; knee surgery; Cholecystectomy (12/10/2022); and Cholecystectomy, laparoscopic (N/A, 12/10/2022).     Social History     Socioeconomic History    Marital status:      Spouse name: Not on file    Number of children: Not on file    Years of education: Not on file    Highest education level: Not on file   Occupational History    Not on file   Tobacco Use    Smoking status: Former     Packs/day: 1.00     Types: Cigarettes    Smokeless tobacco: Never   Substance and Sexual Activity    Alcohol use: No    Drug use: No    Sexual activity: Not on file   Other Topics

## 2023-05-09 LAB
EKG ATRIAL RATE: 94 BPM
EKG P AXIS: 55 DEGREES
EKG P-R INTERVAL: 184 MS
EKG Q-T INTERVAL: 400 MS
EKG QRS DURATION: 116 MS
EKG QTC CALCULATION (BAZETT): 500 MS
EKG R AXIS: -13 DEGREES
EKG T AXIS: 37 DEGREES
EKG VENTRICULAR RATE: 94 BPM

## 2023-05-14 ENCOUNTER — HOSPITAL ENCOUNTER (INPATIENT)
Age: 73
LOS: 4 days | Discharge: HOME | End: 2023-05-18
Attending: EMERGENCY MEDICINE | Admitting: FAMILY MEDICINE
Payer: COMMERCIAL

## 2023-05-14 ENCOUNTER — APPOINTMENT (OUTPATIENT)
Dept: GENERAL RADIOLOGY | Age: 73
End: 2023-05-14
Payer: COMMERCIAL

## 2023-05-14 DIAGNOSIS — I21.4 NSTEMI (NON-ST ELEVATED MYOCARDIAL INFARCTION) (HCC): Primary | ICD-10-CM

## 2023-05-14 LAB
ABSOLUTE EOS #: <0.03 K/UL (ref 0–0.44)
ABSOLUTE IMMATURE GRANULOCYTE: 0.12 K/UL (ref 0–0.3)
ABSOLUTE LYMPH #: 0.96 K/UL (ref 1.1–3.7)
ABSOLUTE MONO #: 1.48 K/UL (ref 0.1–1.2)
ALBUMIN SERPL-MCNC: 3.5 G/DL (ref 3.5–5.2)
ALBUMIN/GLOBULIN RATIO: 1 (ref 1–2.5)
ALP SERPL-CCNC: 169 U/L (ref 40–129)
ALT SERPL-CCNC: 33 U/L (ref 5–41)
AMMONIA PLAS-SCNC: 48 UMOL/L (ref 16–60)
ANION GAP SERPL CALCULATED.3IONS-SCNC: 14 MMOL/L (ref 9–17)
AST SERPL-CCNC: 21 U/L
BASOPHILS # BLD: 0 % (ref 0–2)
BASOPHILS ABSOLUTE: 0.03 K/UL (ref 0–0.2)
BILIRUB DIRECT SERPL-MCNC: 0.4 MG/DL
BILIRUB INDIRECT SERPL-MCNC: 0.7 MG/DL (ref 0–1)
BILIRUB SERPL-MCNC: 1.1 MG/DL (ref 0.3–1.2)
BUN SERPL-MCNC: 36 MG/DL (ref 8–23)
CALCIUM SERPL-MCNC: 8.7 MG/DL (ref 8.6–10.4)
CHLORIDE SERPL-SCNC: 105 MMOL/L (ref 98–107)
CO2 SERPL-SCNC: 15 MMOL/L (ref 20–31)
CREAT SERPL-MCNC: 1.85 MG/DL (ref 0.7–1.2)
EOSINOPHILS RELATIVE PERCENT: 0 % (ref 1–4)
FLUAV AG SPEC QL: NEGATIVE
FLUBV AG SPEC QL: NEGATIVE
GFR SERPL CREATININE-BSD FRML MDRD: 38 ML/MIN/1.73M2
GLUCOSE SERPL-MCNC: 126 MG/DL (ref 70–99)
HCT VFR BLD AUTO: 43.6 % (ref 40.7–50.3)
HCT VFR BLD AUTO: 47.9 % (ref 40.7–50.3)
HGB BLD-MCNC: 13.9 G/DL (ref 13–17)
HGB BLD-MCNC: 15.7 G/DL (ref 13–17)
IMMATURE GRANULOCYTES: 1 %
KEPPRA: 35 UG/ML
LYMPHOCYTES # BLD: 5 % (ref 24–43)
MCH RBC QN AUTO: 28 PG (ref 25.2–33.5)
MCH RBC QN AUTO: 28.2 PG (ref 25.2–33.5)
MCHC RBC AUTO-ENTMCNC: 31.9 G/DL (ref 28.4–34.8)
MCHC RBC AUTO-ENTMCNC: 32.8 G/DL (ref 28.4–34.8)
MCV RBC AUTO: 86.2 FL (ref 82.6–102.9)
MCV RBC AUTO: 87.9 FL (ref 82.6–102.9)
MONOCYTES # BLD: 8 % (ref 3–12)
NRBC AUTOMATED: 0 PER 100 WBC
NRBC AUTOMATED: 0 PER 100 WBC
PARTIAL THROMBOPLASTIN TIME: 28.1 SEC (ref 23–36.5)
PARTIAL THROMBOPLASTIN TIME: 28.8 SEC (ref 23–36.5)
PDW BLD-RTO: 14.4 % (ref 11.8–14.4)
PDW BLD-RTO: 14.6 % (ref 11.8–14.4)
PLATELET # BLD AUTO: 195 K/UL (ref 138–453)
PLATELET # BLD AUTO: 231 K/UL (ref 138–453)
PMV BLD AUTO: 11.7 FL (ref 8.1–13.5)
PMV BLD AUTO: 12 FL (ref 8.1–13.5)
POTASSIUM SERPL-SCNC: 5 MMOL/L (ref 3.7–5.3)
PROT SERPL-MCNC: 6.9 G/DL (ref 6.4–8.3)
RBC # BLD: 4.96 M/UL (ref 4.21–5.77)
RBC # BLD: 5.56 M/UL (ref 4.21–5.77)
RBC # BLD: ABNORMAL 10*6/UL
SARS-COV-2 RDRP RESP QL NAA+PROBE: DETECTED
SEG NEUTROPHILS: 86 % (ref 36–65)
SEGMENTED NEUTROPHILS ABSOLUTE COUNT: 15.78 K/UL (ref 1.5–8.1)
SODIUM SERPL-SCNC: 134 MMOL/L (ref 135–144)
SPECIMEN DESCRIPTION: ABNORMAL
TROPONIN I SERPL DL<=0.01 NG/ML-MCNC: 55 NG/L (ref 0–22)
TROPONIN I SERPL DL<=0.01 NG/ML-MCNC: 58 NG/L (ref 0–22)
WBC # BLD AUTO: 16.7 K/UL (ref 3.5–11.3)
WBC # BLD AUTO: 18.4 K/UL (ref 3.5–11.3)

## 2023-05-14 PROCEDURE — 80076 HEPATIC FUNCTION PANEL: CPT

## 2023-05-14 PROCEDURE — 87635 SARS-COV-2 COVID-19 AMP PRB: CPT

## 2023-05-14 PROCEDURE — 82140 ASSAY OF AMMONIA: CPT

## 2023-05-14 PROCEDURE — 2060000000 HC ICU INTERMEDIATE R&B

## 2023-05-14 PROCEDURE — 85027 COMPLETE CBC AUTOMATED: CPT

## 2023-05-14 PROCEDURE — 2580000003 HC RX 258: Performed by: FAMILY MEDICINE

## 2023-05-14 PROCEDURE — 2580000003 HC RX 258: Performed by: INTERNAL MEDICINE

## 2023-05-14 PROCEDURE — 87804 INFLUENZA ASSAY W/OPTIC: CPT

## 2023-05-14 PROCEDURE — 2580000003 HC RX 258

## 2023-05-14 PROCEDURE — 99222 1ST HOSP IP/OBS MODERATE 55: CPT | Performed by: INTERNAL MEDICINE

## 2023-05-14 PROCEDURE — 85025 COMPLETE CBC W/AUTO DIFF WBC: CPT

## 2023-05-14 PROCEDURE — 71045 X-RAY EXAM CHEST 1 VIEW: CPT

## 2023-05-14 PROCEDURE — 6370000000 HC RX 637 (ALT 250 FOR IP)

## 2023-05-14 PROCEDURE — 93005 ELECTROCARDIOGRAM TRACING: CPT | Performed by: EMERGENCY MEDICINE

## 2023-05-14 PROCEDURE — XW033E5 INTRODUCTION OF REMDESIVIR ANTI-INFECTIVE INTO PERIPHERAL VEIN, PERCUTANEOUS APPROACH, NEW TECHNOLOGY GROUP 5: ICD-10-PCS | Performed by: INTERNAL MEDICINE

## 2023-05-14 PROCEDURE — 80177 DRUG SCRN QUAN LEVETIRACETAM: CPT

## 2023-05-14 PROCEDURE — 80048 BASIC METABOLIC PNL TOTAL CA: CPT

## 2023-05-14 PROCEDURE — 85730 THROMBOPLASTIN TIME PARTIAL: CPT

## 2023-05-14 PROCEDURE — 36415 COLL VENOUS BLD VENIPUNCTURE: CPT

## 2023-05-14 PROCEDURE — 84484 ASSAY OF TROPONIN QUANT: CPT

## 2023-05-14 PROCEDURE — 6360000002 HC RX W HCPCS: Performed by: INTERNAL MEDICINE

## 2023-05-14 PROCEDURE — 6360000002 HC RX W HCPCS

## 2023-05-14 PROCEDURE — 99285 EMERGENCY DEPT VISIT HI MDM: CPT

## 2023-05-14 PROCEDURE — 6370000000 HC RX 637 (ALT 250 FOR IP): Performed by: FAMILY MEDICINE

## 2023-05-14 RX ORDER — PANTOPRAZOLE SODIUM 40 MG/1
40 TABLET, DELAYED RELEASE ORAL DAILY
Status: DISCONTINUED | OUTPATIENT
Start: 2023-05-14 | End: 2023-05-18 | Stop reason: HOSPADM

## 2023-05-14 RX ORDER — HEPARIN SODIUM 1000 [USP'U]/ML
4000 INJECTION, SOLUTION INTRAVENOUS; SUBCUTANEOUS ONCE
Status: COMPLETED | OUTPATIENT
Start: 2023-05-14 | End: 2023-05-14

## 2023-05-14 RX ORDER — ASPIRIN 81 MG/1
81 TABLET, CHEWABLE ORAL DAILY
Status: DISCONTINUED | OUTPATIENT
Start: 2023-05-14 | End: 2023-05-18 | Stop reason: HOSPADM

## 2023-05-14 RX ORDER — HEPARIN SODIUM 1000 [USP'U]/ML
2000 INJECTION, SOLUTION INTRAVENOUS; SUBCUTANEOUS PRN
Status: DISCONTINUED | OUTPATIENT
Start: 2023-05-14 | End: 2023-05-18 | Stop reason: ALTCHOICE

## 2023-05-14 RX ORDER — LEVETIRACETAM 500 MG/1
500 TABLET ORAL 2 TIMES DAILY
Status: DISCONTINUED | OUTPATIENT
Start: 2023-05-14 | End: 2023-05-18 | Stop reason: HOSPADM

## 2023-05-14 RX ORDER — METOPROLOL SUCCINATE 50 MG/1
50 TABLET, EXTENDED RELEASE ORAL DAILY
Status: DISCONTINUED | OUTPATIENT
Start: 2023-05-14 | End: 2023-05-18 | Stop reason: HOSPADM

## 2023-05-14 RX ORDER — ENOXAPARIN SODIUM 100 MG/ML
40 INJECTION SUBCUTANEOUS DAILY
Status: DISCONTINUED | OUTPATIENT
Start: 2023-05-14 | End: 2023-05-14

## 2023-05-14 RX ORDER — HEPARIN SODIUM 1000 [USP'U]/ML
4000 INJECTION, SOLUTION INTRAVENOUS; SUBCUTANEOUS PRN
Status: DISCONTINUED | OUTPATIENT
Start: 2023-05-14 | End: 2023-05-18 | Stop reason: ALTCHOICE

## 2023-05-14 RX ORDER — DIVALPROEX SODIUM 125 MG/1
125 TABLET, DELAYED RELEASE ORAL 2 TIMES DAILY
Status: DISCONTINUED | OUTPATIENT
Start: 2023-05-14 | End: 2023-05-15

## 2023-05-14 RX ORDER — AMLODIPINE BESYLATE 5 MG/1
5 TABLET ORAL 2 TIMES DAILY
Status: DISCONTINUED | OUTPATIENT
Start: 2023-05-14 | End: 2023-05-18 | Stop reason: HOSPADM

## 2023-05-14 RX ORDER — 0.9 % SODIUM CHLORIDE 0.9 %
1000 INTRAVENOUS SOLUTION INTRAVENOUS ONCE
Status: COMPLETED | OUTPATIENT
Start: 2023-05-14 | End: 2023-05-14

## 2023-05-14 RX ORDER — PYRIDOSTIGMINE BROMIDE 60 MG/1
60 TABLET ORAL ONCE
Status: COMPLETED | OUTPATIENT
Start: 2023-05-14 | End: 2023-05-14

## 2023-05-14 RX ORDER — HEPARIN SODIUM 10000 [USP'U]/100ML
5-30 INJECTION, SOLUTION INTRAVENOUS CONTINUOUS
Status: DISCONTINUED | OUTPATIENT
Start: 2023-05-14 | End: 2023-05-18 | Stop reason: ALTCHOICE

## 2023-05-14 RX ORDER — MIRTAZAPINE 15 MG/1
15 TABLET, FILM COATED ORAL NIGHTLY
COMMUNITY

## 2023-05-14 RX ORDER — PYRIDOSTIGMINE BROMIDE 60 MG/1
60 TABLET ORAL
Status: DISCONTINUED | OUTPATIENT
Start: 2023-05-14 | End: 2023-05-18 | Stop reason: HOSPADM

## 2023-05-14 RX ORDER — ONDANSETRON 2 MG/ML
4 INJECTION INTRAMUSCULAR; INTRAVENOUS EVERY 4 HOURS PRN
Status: DISCONTINUED | OUTPATIENT
Start: 2023-05-14 | End: 2023-05-18 | Stop reason: HOSPADM

## 2023-05-14 RX ORDER — 0.9 % SODIUM CHLORIDE 0.9 %
30 INTRAVENOUS SOLUTION INTRAVENOUS PRN
Status: DISCONTINUED | OUTPATIENT
Start: 2023-05-14 | End: 2023-05-18 | Stop reason: HOSPADM

## 2023-05-14 RX ORDER — SODIUM CHLORIDE 9 MG/ML
INJECTION, SOLUTION INTRAVENOUS CONTINUOUS
Status: DISCONTINUED | OUTPATIENT
Start: 2023-05-14 | End: 2023-05-15

## 2023-05-14 RX ADMIN — AMLODIPINE BESYLATE 5 MG: 5 TABLET ORAL at 20:51

## 2023-05-14 RX ADMIN — HEPARIN SODIUM 12 UNITS/KG/HR: 10000 INJECTION, SOLUTION INTRAVENOUS at 17:06

## 2023-05-14 RX ADMIN — SODIUM CHLORIDE: 9 INJECTION, SOLUTION INTRAVENOUS at 17:17

## 2023-05-14 RX ADMIN — PYRIDOSTIGMINE BROMIDE 60 MG: 60 TABLET ORAL at 20:50

## 2023-05-14 RX ADMIN — REMDESIVIR 200 MG: 100 INJECTION, POWDER, LYOPHILIZED, FOR SOLUTION INTRAVENOUS at 21:54

## 2023-05-14 RX ADMIN — PYRIDOSTIGMINE BROMIDE 60 MG: 60 TABLET ORAL at 14:55

## 2023-05-14 RX ADMIN — ASPIRIN 81 MG: 81 TABLET, CHEWABLE ORAL at 20:51

## 2023-05-14 RX ADMIN — LEVETIRACETAM 500 MG: 500 TABLET, FILM COATED ORAL at 20:51

## 2023-05-14 RX ADMIN — SODIUM CHLORIDE 1000 ML: 9 INJECTION, SOLUTION INTRAVENOUS at 14:43

## 2023-05-14 RX ADMIN — HEPARIN SODIUM 4000 UNITS: 1000 INJECTION INTRAVENOUS; SUBCUTANEOUS at 17:06

## 2023-05-14 RX ADMIN — PANTOPRAZOLE SODIUM 40 MG: 40 TABLET, DELAYED RELEASE ORAL at 20:50

## 2023-05-14 RX ADMIN — METOPROLOL SUCCINATE 50 MG: 50 TABLET, EXTENDED RELEASE ORAL at 20:51

## 2023-05-14 ASSESSMENT — ENCOUNTER SYMPTOMS
CHEST TIGHTNESS: 0
WHEEZING: 0
TROUBLE SWALLOWING: 0
VOMITING: 1
NAUSEA: 1
COUGH: 1
ABDOMINAL DISTENTION: 0
BACK PAIN: 0
SHORTNESS OF BREATH: 0
SORE THROAT: 0
ABDOMINAL PAIN: 0

## 2023-05-14 ASSESSMENT — LIFESTYLE VARIABLES
HOW MANY STANDARD DRINKS CONTAINING ALCOHOL DO YOU HAVE ON A TYPICAL DAY: PATIENT DOES NOT DRINK
HOW OFTEN DO YOU HAVE A DRINK CONTAINING ALCOHOL: NEVER

## 2023-05-15 ENCOUNTER — APPOINTMENT (OUTPATIENT)
Dept: ULTRASOUND IMAGING | Age: 73
End: 2023-05-15
Payer: COMMERCIAL

## 2023-05-15 PROBLEM — E87.20 METABOLIC ACIDOSIS: Status: ACTIVE | Noted: 2023-05-15

## 2023-05-15 LAB
ANION GAP SERPL CALCULATED.3IONS-SCNC: 11 MMOL/L (ref 9–17)
ANION GAP SERPL CALCULATED.3IONS-SCNC: 15 MMOL/L (ref 9–17)
BILIRUB UR QL STRIP: NEGATIVE
BUN SERPL-MCNC: 35 MG/DL (ref 8–23)
BUN SERPL-MCNC: 36 MG/DL (ref 8–23)
CALCIUM SERPL-MCNC: 8.3 MG/DL (ref 8.6–10.4)
CALCIUM SERPL-MCNC: 8.3 MG/DL (ref 8.6–10.4)
CHLORIDE SERPL-SCNC: 112 MMOL/L (ref 98–107)
CHLORIDE SERPL-SCNC: 114 MMOL/L (ref 98–107)
CLARITY UR: CLEAR
CO2 SERPL-SCNC: 15 MMOL/L (ref 20–31)
CO2 SERPL-SCNC: 9 MMOL/L (ref 20–31)
COLOR UR: YELLOW
CREAT SERPL-MCNC: 1.46 MG/DL (ref 0.7–1.2)
CREAT SERPL-MCNC: 1.66 MG/DL (ref 0.7–1.2)
CRP SERPL HS-MCNC: 103 MG/L (ref 0–5)
EKG ATRIAL RATE: 70 BPM
EKG ATRIAL RATE: 76 BPM
EKG P AXIS: 57 DEGREES
EKG P AXIS: 73 DEGREES
EKG P-R INTERVAL: 164 MS
EKG P-R INTERVAL: 164 MS
EKG Q-T INTERVAL: 438 MS
EKG Q-T INTERVAL: 440 MS
EKG QRS DURATION: 122 MS
EKG QRS DURATION: 126 MS
EKG QTC CALCULATION (BAZETT): 475 MS
EKG QTC CALCULATION (BAZETT): 492 MS
EKG R AXIS: -20 DEGREES
EKG R AXIS: 3 DEGREES
EKG T AXIS: 30 DEGREES
EKG T AXIS: 31 DEGREES
EKG VENTRICULAR RATE: 70 BPM
EKG VENTRICULAR RATE: 76 BPM
EPI CELLS #/AREA URNS HPF: NORMAL /HPF (ref 0–5)
FREE KAPPA/LAMBDA RATIO: 2.08 (ref 0.26–1.65)
GFR SERPL CREATININE-BSD FRML MDRD: 44 ML/MIN/1.73M2
GFR SERPL CREATININE-BSD FRML MDRD: 51 ML/MIN/1.73M2
GLUCOSE SERPL-MCNC: 112 MG/DL (ref 70–99)
GLUCOSE SERPL-MCNC: 122 MG/DL (ref 70–99)
GLUCOSE UR STRIP.AUTO-MCNC: NEGATIVE MG/DL
HGB UR QL STRIP.AUTO: ABNORMAL
KAPPA LC FREE SER-MCNC: 7.05 MG/DL (ref 0.37–1.94)
KETONES UR STRIP.AUTO-MCNC: NEGATIVE MG/DL
LACTIC ACID, WHOLE BLOOD: 1.5 MMOL/L (ref 0.7–2.1)
LAMBDA LC FREE SERPL-MCNC: 3.39 MG/DL (ref 0.57–2.63)
LEUKOCYTE ESTERASE UR QL STRIP: NEGATIVE
NEGATIVE BASE EXCESS, ART: 8 (ref 0–2)
NITRITE UR QL STRIP: NEGATIVE
O2 DEVICE/FLOW/%: ABNORMAL
PARTIAL THROMBOPLASTIN TIME: 48.2 SEC (ref 23–36.5)
PARTIAL THROMBOPLASTIN TIME: 49.2 SEC (ref 23–36.5)
PARTIAL THROMBOPLASTIN TIME: 92.9 SEC (ref 23–36.5)
POC HCO3: 15.4 MMOL/L (ref 21–28)
POC O2 SATURATION: 98 % (ref 94–98)
POC PCO2: 26.4 MM HG (ref 35–48)
POC PH: 7.37 (ref 7.35–7.45)
POC PO2: 104.4 MM HG (ref 83–108)
POTASSIUM SERPL-SCNC: 5 MMOL/L (ref 3.7–5.3)
POTASSIUM SERPL-SCNC: 5.1 MMOL/L (ref 3.7–5.3)
PROT UR STRIP-MCNC: ABNORMAL MG/DL
PROT UR STRIP.AUTO-MCNC: 5.5 MG/DL (ref 5–8)
RBC #/AREA URNS HPF: NORMAL /HPF (ref 0–4)
SAMPLE SITE: ABNORMAL
SODIUM SERPL-SCNC: 138 MMOL/L (ref 135–144)
SODIUM SERPL-SCNC: 138 MMOL/L (ref 135–144)
SP GR UR STRIP.AUTO: 1.02 (ref 1–1.03)
UROBILINOGEN UR STRIP-ACNC: NORMAL
WBC #/AREA URNS HPF: NORMAL /HPF (ref 0–5)

## 2023-05-15 PROCEDURE — 6360000002 HC RX W HCPCS

## 2023-05-15 PROCEDURE — 36415 COLL VENOUS BLD VENIPUNCTURE: CPT

## 2023-05-15 PROCEDURE — 84156 ASSAY OF PROTEIN URINE: CPT

## 2023-05-15 PROCEDURE — 99222 1ST HOSP IP/OBS MODERATE 55: CPT | Performed by: INTERNAL MEDICINE

## 2023-05-15 PROCEDURE — 86334 IMMUNOFIX E-PHORESIS SERUM: CPT

## 2023-05-15 PROCEDURE — 84155 ASSAY OF PROTEIN SERUM: CPT

## 2023-05-15 PROCEDURE — 76770 US EXAM ABDO BACK WALL COMP: CPT

## 2023-05-15 PROCEDURE — 81001 URINALYSIS AUTO W/SCOPE: CPT

## 2023-05-15 PROCEDURE — 6370000000 HC RX 637 (ALT 250 FOR IP): Performed by: FAMILY MEDICINE

## 2023-05-15 PROCEDURE — 86038 ANTINUCLEAR ANTIBODIES: CPT

## 2023-05-15 PROCEDURE — 86225 DNA ANTIBODY NATIVE: CPT

## 2023-05-15 PROCEDURE — 6360000002 HC RX W HCPCS: Performed by: INTERNAL MEDICINE

## 2023-05-15 PROCEDURE — 2060000000 HC ICU INTERMEDIATE R&B

## 2023-05-15 PROCEDURE — 36600 WITHDRAWAL OF ARTERIAL BLOOD: CPT

## 2023-05-15 PROCEDURE — 99232 SBSQ HOSP IP/OBS MODERATE 35: CPT | Performed by: INTERNAL MEDICINE

## 2023-05-15 PROCEDURE — 82803 BLOOD GASES ANY COMBINATION: CPT

## 2023-05-15 PROCEDURE — 82570 ASSAY OF URINE CREATININE: CPT

## 2023-05-15 PROCEDURE — 2580000003 HC RX 258: Performed by: INTERNAL MEDICINE

## 2023-05-15 PROCEDURE — 83521 IG LIGHT CHAINS FREE EACH: CPT

## 2023-05-15 PROCEDURE — 86140 C-REACTIVE PROTEIN: CPT

## 2023-05-15 PROCEDURE — 85730 THROMBOPLASTIN TIME PARTIAL: CPT

## 2023-05-15 PROCEDURE — 94761 N-INVAS EAR/PLS OXIMETRY MLT: CPT

## 2023-05-15 PROCEDURE — 84165 PROTEIN E-PHORESIS SERUM: CPT

## 2023-05-15 PROCEDURE — 83605 ASSAY OF LACTIC ACID: CPT

## 2023-05-15 PROCEDURE — 80048 BASIC METABOLIC PNL TOTAL CA: CPT

## 2023-05-15 PROCEDURE — 2500000003 HC RX 250 WO HCPCS: Performed by: INTERNAL MEDICINE

## 2023-05-15 RX ADMIN — HEPARIN SODIUM 2000 UNITS: 1000 INJECTION INTRAVENOUS; SUBCUTANEOUS at 00:50

## 2023-05-15 RX ADMIN — DIVALPROEX SODIUM 125 MG: 125 TABLET, DELAYED RELEASE ORAL at 08:16

## 2023-05-15 RX ADMIN — PYRIDOSTIGMINE BROMIDE 60 MG: 60 TABLET ORAL at 15:00

## 2023-05-15 RX ADMIN — REMDESIVIR 100 MG: 100 INJECTION, POWDER, LYOPHILIZED, FOR SOLUTION INTRAVENOUS at 21:36

## 2023-05-15 RX ADMIN — SODIUM BICARBONATE: 84 INJECTION, SOLUTION INTRAVENOUS at 14:59

## 2023-05-15 RX ADMIN — METOPROLOL SUCCINATE 50 MG: 50 TABLET, EXTENDED RELEASE ORAL at 08:16

## 2023-05-15 RX ADMIN — LEVETIRACETAM 500 MG: 500 TABLET, FILM COATED ORAL at 21:37

## 2023-05-15 RX ADMIN — PYRIDOSTIGMINE BROMIDE 60 MG: 60 TABLET ORAL at 00:17

## 2023-05-15 RX ADMIN — PYRIDOSTIGMINE BROMIDE 60 MG: 60 TABLET ORAL at 06:31

## 2023-05-15 RX ADMIN — PYRIDOSTIGMINE BROMIDE 60 MG: 60 TABLET ORAL at 19:48

## 2023-05-15 RX ADMIN — HEPARIN SODIUM 2000 UNITS: 1000 INJECTION INTRAVENOUS; SUBCUTANEOUS at 09:15

## 2023-05-15 RX ADMIN — PANTOPRAZOLE SODIUM 40 MG: 40 TABLET, DELAYED RELEASE ORAL at 08:16

## 2023-05-15 RX ADMIN — PYRIDOSTIGMINE BROMIDE 60 MG: 60 TABLET ORAL at 12:12

## 2023-05-15 RX ADMIN — ASPIRIN 81 MG: 81 TABLET, CHEWABLE ORAL at 08:16

## 2023-05-15 RX ADMIN — LEVETIRACETAM 500 MG: 500 TABLET, FILM COATED ORAL at 08:16

## 2023-05-15 RX ADMIN — AMLODIPINE BESYLATE 5 MG: 5 TABLET ORAL at 08:16

## 2023-05-15 RX ADMIN — PYRIDOSTIGMINE BROMIDE 60 MG: 60 TABLET ORAL at 23:14

## 2023-05-15 RX ADMIN — AMLODIPINE BESYLATE 5 MG: 5 TABLET ORAL at 15:00

## 2023-05-15 NOTE — H&P
History and Physical (Hospital)    STVZ 5C STEPDOWN     CHIEF COMPLAINT: Fatigue and Emesis      Patient Status:  Inpt     HISTORY OF PRESENT ILLNESS:    From the ER this patient is a 71 y.o. male whopresents with  Chief Complaint   Patient presents with    Fatigue    Emesis   . HPI   71 y.o. male who presents with generalized fatigue, nausea, vomiting for the past week. Patient has had associated decrease in appetite, decrease in oral intake. Patient states he has had recent cough, congestion. Denies fever, chills, chest pain, shortness of breath, abdominal pain. Of note patient was seen here 1 week ago for seizure. Patient has past history of seizures, however had not had a seizure in the past 2 years. At that time patient was placed on Keppra. Patient states he has been taking his Keppra without any missed doses. The patient states that he stopped taking his  Esvin Valerio MD)  This patient apparently for about a week has not been able to really eat much she has not had much of an appetite. Started coughing a lot and having diarrhea. Also has nausea and vomiting. In emergency room he was found to have elevated troponins although they were downtrending. He was also found to have COVID-19. Patient is immunized against COVID-19 but he is overdue for booster dose.   Past Medical History:   Diagnosis Date    Arthritis     Chronic kidney disease     Dysmetabolic syndrome     Hypertension     Iron deficiency anemia, unspecified     Movement disorder     Myasthenia gravis ([address]     Proteinuria     Seizures ([address]        Past Surgical History:   Procedure Laterality Date    CHOLECYSTECTOMY  12/10/2022    XI ROBOTIC LAPAROSCOPIC CHOLECYSTECTOMY, FIREFLY    CHOLECYSTECTOMY, LAPAROSCOPIC N/A 12/10/2022    XI ROBOTIC LAPAROSCOPIC CHOLECYSTECTOMY, FIREFLY performed [facility] Jc Walker MD [facility]       Prior to Admission medications

## 2023-05-15 NOTE — CARE COORDINATION
Transitional planning-called patient on the phone-attempted transition planning-patient not wanting to answer questions.

## 2023-05-16 PROBLEM — U07.1 COVID: Status: ACTIVE | Noted: 2023-05-16

## 2023-05-16 LAB
ANION GAP SERPL CALCULATED.3IONS-SCNC: 12 MMOL/L (ref 9–17)
ANION GAP SERPL CALCULATED.3IONS-SCNC: 12 MMOL/L (ref 9–17)
BASOPHILS # BLD: 0 % (ref 0–2)
BASOPHILS # BLD: 0 K/UL (ref 0–0.2)
BNP SERPL-MCNC: 1360 PG/ML
BUN SERPL-MCNC: 28 MG/DL (ref 8–23)
BUN SERPL-MCNC: 33 MG/DL (ref 8–23)
CALCIUM SERPL-MCNC: 8.1 MG/DL (ref 8.6–10.4)
CALCIUM SERPL-MCNC: 8.2 MG/DL (ref 8.6–10.4)
CHLORIDE SERPL-SCNC: 105 MMOL/L (ref 98–107)
CHLORIDE SERPL-SCNC: 110 MMOL/L (ref 98–107)
CO2 SERPL-SCNC: 15 MMOL/L (ref 20–31)
CO2 SERPL-SCNC: 23 MMOL/L (ref 20–31)
CREAT SERPL-MCNC: 1.34 MG/DL (ref 0.7–1.2)
CREAT SERPL-MCNC: 1.46 MG/DL (ref 0.7–1.2)
CREAT UR-MCNC: 114.1 MG/DL (ref 39–259)
EOSINOPHIL # BLD: 0.21 K/UL (ref 0–0.4)
EOSINOPHILS RELATIVE PERCENT: 2 % (ref 1–4)
ERYTHROCYTE [DISTWIDTH] IN BLOOD BY AUTOMATED COUNT: 14.6 % (ref 11.8–14.4)
GFR SERPL CREATININE-BSD FRML MDRD: 51 ML/MIN/1.73M2
GFR SERPL CREATININE-BSD FRML MDRD: 57 ML/MIN/1.73M2
GLUCOSE SERPL-MCNC: 103 MG/DL (ref 70–99)
GLUCOSE SERPL-MCNC: 116 MG/DL (ref 70–99)
HCT VFR BLD AUTO: 37.2 % (ref 40.7–50.3)
HGB BLD-MCNC: 12.6 G/DL (ref 13–17)
IMM GRANULOCYTES # BLD AUTO: 0 K/UL (ref 0–0.3)
IMM GRANULOCYTES NFR BLD: 0 %
LYMPHOCYTES # BLD: 10 % (ref 24–44)
LYMPHOCYTES NFR BLD: 1.07 K/UL (ref 1–4.8)
MCH RBC QN AUTO: 28.3 PG (ref 25.2–33.5)
MCHC RBC AUTO-ENTMCNC: 33.9 G/DL (ref 28.4–34.8)
MCV RBC AUTO: 83.4 FL (ref 82.6–102.9)
MONOCYTES NFR BLD: 0.32 K/UL (ref 0.1–0.8)
MONOCYTES NFR BLD: 3 % (ref 1–7)
MORPHOLOGY: NORMAL
NEUTROPHILS NFR BLD: 85 % (ref 36–66)
NEUTS SEG NFR BLD: 9.1 K/UL (ref 1.8–7.7)
NRBC AUTOMATED: 0 PER 100 WBC
PARTIAL THROMBOPLASTIN TIME: 45.9 SEC (ref 23–36.5)
PARTIAL THROMBOPLASTIN TIME: 59.3 SEC (ref 23–36.5)
PARTIAL THROMBOPLASTIN TIME: 73.3 SEC (ref 23–36.5)
PLATELET # BLD AUTO: 196 K/UL (ref 138–453)
PMV BLD AUTO: 11.4 FL (ref 8.1–13.5)
POTASSIUM SERPL-SCNC: 3.9 MMOL/L (ref 3.7–5.3)
POTASSIUM SERPL-SCNC: 4 MMOL/L (ref 3.7–5.3)
RBC # BLD AUTO: 4.46 M/UL (ref 4.21–5.77)
SODIUM SERPL-SCNC: 137 MMOL/L (ref 135–144)
SODIUM SERPL-SCNC: 140 MMOL/L (ref 135–144)
TOTAL PROTEIN, URINE: 21 MG/DL
URINE TOTAL PROTEIN CREATININE RATIO: 0.18 (ref 0–0.2)
WBC OTHER # BLD: 10.7 K/UL (ref 3.5–11.3)

## 2023-05-16 PROCEDURE — 2580000003 HC RX 258: Performed by: INTERNAL MEDICINE

## 2023-05-16 PROCEDURE — 80048 BASIC METABOLIC PNL TOTAL CA: CPT

## 2023-05-16 PROCEDURE — APPSS30 APP SPLIT SHARED TIME 16-30 MINUTES: Performed by: NURSE PRACTITIONER

## 2023-05-16 PROCEDURE — 85730 THROMBOPLASTIN TIME PARTIAL: CPT

## 2023-05-16 PROCEDURE — 36415 COLL VENOUS BLD VENIPUNCTURE: CPT

## 2023-05-16 PROCEDURE — 2500000003 HC RX 250 WO HCPCS: Performed by: INTERNAL MEDICINE

## 2023-05-16 PROCEDURE — 97530 THERAPEUTIC ACTIVITIES: CPT

## 2023-05-16 PROCEDURE — 97162 PT EVAL MOD COMPLEX 30 MIN: CPT

## 2023-05-16 PROCEDURE — 6360000002 HC RX W HCPCS

## 2023-05-16 PROCEDURE — 99232 SBSQ HOSP IP/OBS MODERATE 35: CPT | Performed by: INTERNAL MEDICINE

## 2023-05-16 PROCEDURE — 6370000000 HC RX 637 (ALT 250 FOR IP): Performed by: FAMILY MEDICINE

## 2023-05-16 PROCEDURE — 85025 COMPLETE CBC W/AUTO DIFF WBC: CPT

## 2023-05-16 PROCEDURE — 6360000002 HC RX W HCPCS: Performed by: INTERNAL MEDICINE

## 2023-05-16 PROCEDURE — 83880 ASSAY OF NATRIURETIC PEPTIDE: CPT

## 2023-05-16 PROCEDURE — 2060000000 HC ICU INTERMEDIATE R&B

## 2023-05-16 RX ORDER — LOPERAMIDE HYDROCHLORIDE 2 MG/1
2 CAPSULE ORAL 4 TIMES DAILY PRN
Status: DISCONTINUED | OUTPATIENT
Start: 2023-05-16 | End: 2023-05-18 | Stop reason: HOSPADM

## 2023-05-16 RX ORDER — SODIUM CHLORIDE 9 MG/ML
INJECTION, SOLUTION INTRAVENOUS CONTINUOUS
Status: DISCONTINUED | OUTPATIENT
Start: 2023-05-16 | End: 2023-05-18 | Stop reason: HOSPADM

## 2023-05-16 RX ADMIN — LEVETIRACETAM 500 MG: 500 TABLET, FILM COATED ORAL at 09:49

## 2023-05-16 RX ADMIN — PYRIDOSTIGMINE BROMIDE 60 MG: 60 TABLET ORAL at 11:42

## 2023-05-16 RX ADMIN — PYRIDOSTIGMINE BROMIDE 60 MG: 60 TABLET ORAL at 19:30

## 2023-05-16 RX ADMIN — HEPARIN SODIUM 13 UNITS/KG/HR: 10000 INJECTION, SOLUTION INTRAVENOUS at 21:42

## 2023-05-16 RX ADMIN — SODIUM CHLORIDE: 9 INJECTION, SOLUTION INTRAVENOUS at 19:32

## 2023-05-16 RX ADMIN — LEVETIRACETAM 500 MG: 500 TABLET, FILM COATED ORAL at 21:54

## 2023-05-16 RX ADMIN — AMLODIPINE BESYLATE 5 MG: 5 TABLET ORAL at 15:52

## 2023-05-16 RX ADMIN — LOPERAMIDE HYDROCHLORIDE 2 MG: 2 CAPSULE ORAL at 23:01

## 2023-05-16 RX ADMIN — PYRIDOSTIGMINE BROMIDE 60 MG: 60 TABLET ORAL at 09:49

## 2023-05-16 RX ADMIN — SODIUM BICARBONATE: 84 INJECTION, SOLUTION INTRAVENOUS at 03:55

## 2023-05-16 RX ADMIN — ASPIRIN 81 MG: 81 TABLET, CHEWABLE ORAL at 09:50

## 2023-05-16 RX ADMIN — REMDESIVIR 100 MG: 100 INJECTION, POWDER, LYOPHILIZED, FOR SOLUTION INTRAVENOUS at 21:52

## 2023-05-16 RX ADMIN — PYRIDOSTIGMINE BROMIDE 60 MG: 60 TABLET ORAL at 15:52

## 2023-05-16 RX ADMIN — PANTOPRAZOLE SODIUM 40 MG: 40 TABLET, DELAYED RELEASE ORAL at 09:49

## 2023-05-16 RX ADMIN — SODIUM BICARBONATE: 84 INJECTION, SOLUTION INTRAVENOUS at 15:55

## 2023-05-16 RX ADMIN — AMLODIPINE BESYLATE 5 MG: 5 TABLET ORAL at 09:50

## 2023-05-16 RX ADMIN — HEPARIN SODIUM 2000 UNITS: 1000 INJECTION INTRAVENOUS; SUBCUTANEOUS at 13:45

## 2023-05-16 RX ADMIN — METOPROLOL SUCCINATE 50 MG: 50 TABLET, EXTENDED RELEASE ORAL at 09:50

## 2023-05-16 RX ADMIN — PYRIDOSTIGMINE BROMIDE 60 MG: 60 TABLET ORAL at 23:01

## 2023-05-16 NOTE — CARE COORDINATION
Transitional planning    Patient very [place] and Covid +, will defer intake assessment until daughter arrives today.

## 2023-05-17 LAB
ANA SER QL IA: NEGATIVE
ANION GAP SERPL CALCULATED.3IONS-SCNC: 12 MMOL/L (ref 9–17)
BASOPHILS # BLD: <0.03 K/UL (ref 0–0.2)
BASOPHILS NFR BLD: 0 % (ref 0–2)
BNP SERPL-MCNC: 2184 PG/ML
BUN SERPL-MCNC: 23 MG/DL (ref 8–23)
CALCIUM SERPL-MCNC: 7.8 MG/DL (ref 8.6–10.4)
CHLORIDE SERPL-SCNC: 108 MMOL/L (ref 98–107)
CO2 SERPL-SCNC: 23 MMOL/L (ref 20–31)
CREAT SERPL-MCNC: 1.17 MG/DL (ref 0.7–1.2)
DSDNA IGG SER QL IA: 1.3 IU/ML
EOSINOPHIL # BLD: 0.05 K/UL (ref 0–0.44)
EOSINOPHILS RELATIVE PERCENT: 1 % (ref 1–4)
ERYTHROCYTE [DISTWIDTH] IN BLOOD BY AUTOMATED COUNT: 14 % (ref 11.8–14.4)
GFR SERPL CREATININE-BSD FRML MDRD: >60 ML/MIN/1.73M2
GLUCOSE BLD-MCNC: 108 MG/DL (ref 75–110)
GLUCOSE SERPL-MCNC: 96 MG/DL (ref 70–99)
HCT VFR BLD AUTO: 37.1 % (ref 40.7–50.3)
HGB BLD-MCNC: 12.2 G/DL (ref 13–17)
IMM GRANULOCYTES # BLD AUTO: 0.03 K/UL (ref 0–0.3)
IMM GRANULOCYTES NFR BLD: 0 %
LYMPHOCYTES # BLD: 12 % (ref 24–43)
LYMPHOCYTES NFR BLD: 0.89 K/UL (ref 1.1–3.7)
MCH RBC QN AUTO: 27.9 PG (ref 25.2–33.5)
MCHC RBC AUTO-ENTMCNC: 32.9 G/DL (ref 28.4–34.8)
MCV RBC AUTO: 84.7 FL (ref 82.6–102.9)
MONOCYTES NFR BLD: 0.96 K/UL (ref 0.1–1.2)
MONOCYTES NFR BLD: 13 % (ref 3–12)
NEUTROPHILS NFR BLD: 74 % (ref 36–65)
NEUTS SEG NFR BLD: 5.47 K/UL (ref 1.5–8.1)
NRBC AUTOMATED: 0 PER 100 WBC
NUCLEAR IGG SER IA-RTO: 0.1 U/ML
PARTIAL THROMBOPLASTIN TIME: 54.2 SEC (ref 23–36.5)
PARTIAL THROMBOPLASTIN TIME: 56.6 SEC (ref 23–36.5)
PLATELET # BLD AUTO: 223 K/UL (ref 138–453)
PMV BLD AUTO: 12.1 FL (ref 8.1–13.5)
POTASSIUM SERPL-SCNC: 3.8 MMOL/L (ref 3.7–5.3)
RBC # BLD AUTO: 4.38 M/UL (ref 4.21–5.77)
SODIUM SERPL-SCNC: 143 MMOL/L (ref 135–144)
WBC OTHER # BLD: 7.4 K/UL (ref 3.5–11.3)

## 2023-05-17 PROCEDURE — 2580000003 HC RX 258: Performed by: INTERNAL MEDICINE

## 2023-05-17 PROCEDURE — 82728 ASSAY OF FERRITIN: CPT

## 2023-05-17 PROCEDURE — 6360000002 HC RX W HCPCS: Performed by: INTERNAL MEDICINE

## 2023-05-17 PROCEDURE — 6370000000 HC RX 637 (ALT 250 FOR IP): Performed by: FAMILY MEDICINE

## 2023-05-17 PROCEDURE — 99232 SBSQ HOSP IP/OBS MODERATE 35: CPT | Performed by: INTERNAL MEDICINE

## 2023-05-17 PROCEDURE — 82947 ASSAY GLUCOSE BLOOD QUANT: CPT

## 2023-05-17 PROCEDURE — 85730 THROMBOPLASTIN TIME PARTIAL: CPT

## 2023-05-17 PROCEDURE — 85025 COMPLETE CBC W/AUTO DIFF WBC: CPT

## 2023-05-17 PROCEDURE — 6360000002 HC RX W HCPCS

## 2023-05-17 PROCEDURE — 83880 ASSAY OF NATRIURETIC PEPTIDE: CPT

## 2023-05-17 PROCEDURE — 83550 IRON BINDING TEST: CPT

## 2023-05-17 PROCEDURE — 2060000000 HC ICU INTERMEDIATE R&B

## 2023-05-17 PROCEDURE — 83540 ASSAY OF IRON: CPT

## 2023-05-17 PROCEDURE — 80048 BASIC METABOLIC PNL TOTAL CA: CPT

## 2023-05-17 PROCEDURE — 36415 COLL VENOUS BLD VENIPUNCTURE: CPT

## 2023-05-17 RX ORDER — NYSTATIN 100000 U/G
CREAM TOPICAL 2 TIMES DAILY
Status: DISCONTINUED | OUTPATIENT
Start: 2023-05-17 | End: 2023-05-18 | Stop reason: HOSPADM

## 2023-05-17 RX ADMIN — PANTOPRAZOLE SODIUM 40 MG: 40 TABLET, DELAYED RELEASE ORAL at 08:29

## 2023-05-17 RX ADMIN — METOPROLOL SUCCINATE 50 MG: 50 TABLET, EXTENDED RELEASE ORAL at 08:29

## 2023-05-17 RX ADMIN — PYRIDOSTIGMINE BROMIDE 60 MG: 60 TABLET ORAL at 20:05

## 2023-05-17 RX ADMIN — LEVETIRACETAM 500 MG: 500 TABLET, FILM COATED ORAL at 08:29

## 2023-05-17 RX ADMIN — PYRIDOSTIGMINE BROMIDE 60 MG: 60 TABLET ORAL at 16:58

## 2023-05-17 RX ADMIN — SODIUM CHLORIDE: 9 INJECTION, SOLUTION INTRAVENOUS at 19:48

## 2023-05-17 RX ADMIN — REMDESIVIR 100 MG: 100 INJECTION, POWDER, LYOPHILIZED, FOR SOLUTION INTRAVENOUS at 21:20

## 2023-05-17 RX ADMIN — ASPIRIN 81 MG: 81 TABLET, CHEWABLE ORAL at 08:29

## 2023-05-17 RX ADMIN — AMLODIPINE BESYLATE 5 MG: 5 TABLET ORAL at 16:58

## 2023-05-17 RX ADMIN — PYRIDOSTIGMINE BROMIDE 60 MG: 60 TABLET ORAL at 08:29

## 2023-05-17 RX ADMIN — HEPARIN SODIUM 13 UNITS/KG/HR: 10000 INJECTION, SOLUTION INTRAVENOUS at 19:47

## 2023-05-17 RX ADMIN — LEVETIRACETAM 500 MG: 500 TABLET, FILM COATED ORAL at 20:05

## 2023-05-17 RX ADMIN — AMLODIPINE BESYLATE 5 MG: 5 TABLET ORAL at 08:29

## 2023-05-17 RX ADMIN — PYRIDOSTIGMINE BROMIDE 60 MG: 60 TABLET ORAL at 11:46

## 2023-05-17 RX ADMIN — NYSTATIN: 100000 CREAM TOPICAL at 22:39

## 2023-05-17 NOTE — PLAN OF CARE
Problem: Discharge Planning  Goal: Discharge to home or other facility with appropriate resources  Outcome: Progressing  Flowsheets (Taken 5/17/2023 0800)  Discharge to home or other facility with appropriate resources:   Identify barriers to discharge with patient and caregiver   Arrange for needed discharge resources and transportation as appropriate   Identify discharge learning needs (meds, wound care, etc)     Problem: Safety - Adult  Goal: Free from fall injury  Outcome: Progressing     Problem: ABCDS Injury Assessment  Goal: Absence of physical injury  Outcome: Progressing     Problem: Skin/Tissue Integrity  Goal: Absence of new skin breakdown  Description: 1. Monitor for areas of redness and/or skin breakdown  2. Assess vascular access sites hourly  3. Every 4-6 hours minimum:  Change oxygen saturation probe site  4. Every 4-6 hours:  If on nasal continuous positive airway pressure, respiratory therapy assess nares and determine need for appliance change or resting period.   Outcome: Progressing     Problem: Chronic Conditions and Co-morbidities  Goal: Patient's chronic conditions and co-morbidity symptoms are monitored and maintained or improved  Outcome: Progressing  Flowsheets (Taken 5/17/2023 0800)  Care Plan - Patient's Chronic Conditions and Co-Morbidity Symptoms are Monitored and Maintained or Improved:   Monitor and assess patient's chronic conditions and comorbid symptoms for stability, deterioration, or improvement   Collaborate with multidisciplinary team to address chronic and comorbid conditions and prevent exacerbation or deterioration

## 2023-05-17 NOTE — CARE COORDINATION
Case Management Assessment  Initial Evaluation    Date/Time of Evaluation: 5/17/2023 3:30PM  Assessment Completed by: Syeda Madison RN    If patient is discharged prior to next notation, then this note serves as note for discharge by case management. Patient Name: Vladimir Diehl                   YOB: 1950  Diagnosis: NSTEMI (non-ST elevated myocardial infarction) [facility]) [I21.4]                   Date / Time: 5/14/2023  1:09 PM    Patient Admission Status: Inpatient   Readmission Risk (Low < 19, Mod (19-27), High > 27): Readmission Risk Score: 15.5    Current PCP: William Lowery, DO  PCP verified by CM? (P) Yes Millicent Talbot    Chart Reviewed: Yes      History Provided by: (P) Child/Family  Patient Orientation:      Patient Cognition:      Hospitalization in the last 30 days (Readmission):  No    If yes, Readmission Assessment in CM Navigator will be completed. Advance Directives:      Code Status: Full Code   Patient's Primary Decision Maker is: (P) Legal Next of Kin      Discharge Planning:    Patient lives with: (P) Family Members Type of Home: (P) House  Primary Care Giver: (P) Self  Patient Support Systems include: (P) Children, Family Members   Current Financial resources: (P) Medicare  Current community resources:    Current services prior to admission: (P) Durable Medical Equipment            Current DME: (P) Cane            Type of Home Care services:  (P) None    ADLS  Prior functional level: (P) Independent in ADLs/IADLs  Current functional level: (P) Independent in ADLs/IADLs    PT AM-PAC: 18 /24  OT AM-PAC:   /24    Family can provide assistance at DC: Would you like Case Management to discuss the discharge plan with any other family members/significant others, and if so, who?     Plans to Return to Present Housing: (P) Yes  Other Identified Issues/Barriers to RETURNING to current housing: none  Potential Assistance needed at discharge: (P) N/A            Potential DME:

## 2023-05-18 VITALS
OXYGEN SATURATION: 99 % | WEIGHT: 156.3 LBS | SYSTOLIC BLOOD PRESSURE: 127 MMHG | BODY MASS INDEX: 22.38 KG/M2 | TEMPERATURE: 97.9 F | RESPIRATION RATE: 17 BRPM | HEART RATE: 77 BPM | DIASTOLIC BLOOD PRESSURE: 65 MMHG | HEIGHT: 70 IN

## 2023-05-18 LAB
ALBUMIN PERCENT: 58 % (ref 45–65)
ALBUMIN SERPL-MCNC: 3.1 G/DL (ref 3.2–5.2)
ALPHA 2 PERCENT: 17 % (ref 6–13)
ALPHA1 GLOB SERPL ELPH-MCNC: 0.1 G/DL (ref 0.1–0.4)
ALPHA1 GLOB SERPL ELPH-MCNC: 3 % (ref 3–6)
ALPHA2 GLOB SERPL ELPH-MCNC: 0.9 G/DL (ref 0.5–0.9)
ANION GAP SERPL CALCULATED.3IONS-SCNC: 10 MMOL/L (ref 9–17)
B-GLOBULIN SERPL ELPH-MCNC: 0.7 G/DL (ref 0.5–1.1)
B-GLOBULIN SERPL ELPH-MCNC: 12 % (ref 11–19)
BASOPHILS # BLD: 0.03 K/UL (ref 0–0.2)
BASOPHILS NFR BLD: 0 % (ref 0–2)
BNP SERPL-MCNC: 3341 PG/ML
BUN SERPL-MCNC: 18 MG/DL (ref 8–23)
CALCIUM SERPL-MCNC: 8 MG/DL (ref 8.6–10.4)
CHLORIDE SERPL-SCNC: 109 MMOL/L (ref 98–107)
CO2 SERPL-SCNC: 18 MMOL/L (ref 20–31)
CREAT SERPL-MCNC: 1.1 MG/DL (ref 0.7–1.2)
EOSINOPHIL # BLD: 0.1 K/UL (ref 0–0.44)
EOSINOPHILS RELATIVE PERCENT: 1 % (ref 1–4)
ERYTHROCYTE [DISTWIDTH] IN BLOOD BY AUTOMATED COUNT: 14.2 % (ref 11.8–14.4)
FERRITIN SERPL-MCNC: 834 NG/ML (ref 30–400)
GAMMA GLOB SERPL ELPH-MCNC: 0.5 G/DL (ref 0.5–1.5)
GAMMA GLOBULIN %: 10 % (ref 9–20)
GFR SERPL CREATININE-BSD FRML MDRD: >60 ML/MIN/1.73M2
GLUCOSE SERPL-MCNC: 102 MG/DL (ref 70–99)
HCT VFR BLD AUTO: 41.9 % (ref 40.7–50.3)
HGB BLD-MCNC: 13.2 G/DL (ref 13–17)
IMM GRANULOCYTES # BLD AUTO: 0.08 K/UL (ref 0–0.3)
IMM GRANULOCYTES NFR BLD: 1 %
INTERPRETATION SERPL IFE-IMP: NORMAL
IRON SATN MFR SERPL: 31 % (ref 20–55)
IRON SERPL-MCNC: 51 UG/DL (ref 59–158)
LYMPHOCYTES # BLD: 14 % (ref 24–43)
LYMPHOCYTES NFR BLD: 1.11 K/UL (ref 1.1–3.7)
MCH RBC QN AUTO: 27.5 PG (ref 25.2–33.5)
MCHC RBC AUTO-ENTMCNC: 31.5 G/DL (ref 28.4–34.8)
MCV RBC AUTO: 87.3 FL (ref 82.6–102.9)
MONOCYTES NFR BLD: 0.87 K/UL (ref 0.1–1.2)
MONOCYTES NFR BLD: 11 % (ref 3–12)
NEUTROPHILS NFR BLD: 73 % (ref 36–65)
NEUTS SEG NFR BLD: 5.5 K/UL (ref 1.5–8.1)
NRBC AUTOMATED: 0 PER 100 WBC
PARTIAL THROMBOPLASTIN TIME: 60.4 SEC (ref 23–36.5)
PATHOLOGIST: ABNORMAL
PATHOLOGIST: NORMAL
PLATELET # BLD AUTO: 249 K/UL (ref 138–453)
PMV BLD AUTO: 11.4 FL (ref 8.1–13.5)
POTASSIUM SERPL-SCNC: 4.1 MMOL/L (ref 3.7–5.3)
PROT PATTERN SERPL ELPH-IMP: ABNORMAL
PROT SERPL-MCNC: 5.3 G/DL (ref 6.4–8.3)
RBC # BLD AUTO: 4.8 M/UL (ref 4.21–5.77)
SODIUM SERPL-SCNC: 137 MMOL/L (ref 135–144)
TIBC SERPL-MCNC: 162 UG/DL (ref 250–450)
TOTAL PROT. SUM,%: 100 % (ref 98–102)
TOTAL PROT. SUM: 5.3 G/DL (ref 6.3–8.2)
UNSATURATED IRON BINDING CAPACITY: 111 UG/DL (ref 112–347)
WBC OTHER # BLD: 7.7 K/UL (ref 3.5–11.3)

## 2023-05-18 PROCEDURE — 97116 GAIT TRAINING THERAPY: CPT

## 2023-05-18 PROCEDURE — 6370000000 HC RX 637 (ALT 250 FOR IP): Performed by: FAMILY MEDICINE

## 2023-05-18 PROCEDURE — 80048 BASIC METABOLIC PNL TOTAL CA: CPT

## 2023-05-18 PROCEDURE — 83880 ASSAY OF NATRIURETIC PEPTIDE: CPT

## 2023-05-18 PROCEDURE — 36415 COLL VENOUS BLD VENIPUNCTURE: CPT

## 2023-05-18 PROCEDURE — 85730 THROMBOPLASTIN TIME PARTIAL: CPT

## 2023-05-18 PROCEDURE — 85025 COMPLETE CBC W/AUTO DIFF WBC: CPT

## 2023-05-18 PROCEDURE — 97110 THERAPEUTIC EXERCISES: CPT

## 2023-05-18 RX ORDER — PYRIDOSTIGMINE BROMIDE 60 MG/1
60 TABLET ORAL
Qty: 60 TABLET | Refills: 3 | Status: SHIPPED | OUTPATIENT
Start: 2023-05-18

## 2023-05-18 RX ORDER — NYSTATIN 100000 U/G
CREAM TOPICAL
Qty: 30 G | Refills: 1 | Status: SHIPPED | OUTPATIENT
Start: 2023-05-18 | End: 2023-05-25

## 2023-05-18 RX ADMIN — LEVETIRACETAM 500 MG: 500 TABLET, FILM COATED ORAL at 08:53

## 2023-05-18 RX ADMIN — AMLODIPINE BESYLATE 5 MG: 5 TABLET ORAL at 08:52

## 2023-05-18 RX ADMIN — AMLODIPINE BESYLATE 5 MG: 5 TABLET ORAL at 17:26

## 2023-05-18 RX ADMIN — PANTOPRAZOLE SODIUM 40 MG: 40 TABLET, DELAYED RELEASE ORAL at 08:53

## 2023-05-18 RX ADMIN — PYRIDOSTIGMINE BROMIDE 60 MG: 60 TABLET ORAL at 00:48

## 2023-05-18 RX ADMIN — ASPIRIN 81 MG: 81 TABLET, CHEWABLE ORAL at 08:53

## 2023-05-18 RX ADMIN — METOPROLOL SUCCINATE 50 MG: 50 TABLET, EXTENDED RELEASE ORAL at 08:53

## 2023-05-18 RX ADMIN — PYRIDOSTIGMINE BROMIDE 60 MG: 60 TABLET ORAL at 08:53

## 2023-05-18 RX ADMIN — PYRIDOSTIGMINE BROMIDE 60 MG: 60 TABLET ORAL at 17:27

## 2023-05-18 RX ADMIN — PYRIDOSTIGMINE BROMIDE 60 MG: 60 TABLET ORAL at 11:27

## 2023-05-18 RX ADMIN — NYSTATIN: 100000 CREAM TOPICAL at 08:53

## 2023-05-18 NOTE — PLAN OF CARE
Problem: Discharge Planning  Goal: Discharge to home or other facility with appropriate resources  5/18/2023 1222 by Cedric Coleman RN  Outcome: Completed  5/18/2023 1157 by Cedric Coleman RN  Outcome: Progressing  Flowsheets  Taken 5/18/2023 1155  Discharge to home or other facility with appropriate resources: Identify barriers to discharge with patient and caregiver  Taken 5/18/2023 0735  Discharge to home or other facility with appropriate resources: Identify barriers to discharge with patient and caregiver  5/17/2023 2247 by Geovanny Gregorio RN  Outcome: Progressing  Flowsheets (Taken 5/17/2023 2000)  Discharge to home or other facility with appropriate resources:   Identify barriers to discharge with patient and caregiver   Arrange for needed discharge resources and transportation as appropriate   Identify discharge learning needs (meds, wound care, etc)   Arrange for interpreters to assist at discharge as needed   Refer to discharge planning if patient needs post-hospital services based on physician order or complex needs related to functional status, cognitive ability or social support system     Problem: Safety - Adult  Goal: Free from fall injury  5/18/2023 1222 by Cedric Coleman RN  Outcome: Completed  Flowsheets (Taken 5/18/2023 0931)  Free From Fall Injury: Instruct family/caregiver on patient safety  5/17/2023 2247 by Geovanny Gregorio RN  Outcome: Progressing     Problem: ABCDS Injury Assessment  Goal: Absence of physical injury  5/18/2023 1222 by Cedric Coleman RN  Outcome: Completed  Flowsheets (Taken 5/18/2023 0931)  Absence of Physical Injury: Implement safety measures based on patient assessment  5/17/2023 2247 by Geovanny Gregorio RN  Outcome: Progressing     Problem: Skin/Tissue Integrity  Goal: Absence of new skin breakdown  Description: 1. Monitor for areas of redness and/or skin breakdown  2. Assess vascular access sites hourly  3.   Every 4-6 hours minimum:  Change oxygen saturation probe

## 2023-05-18 NOTE — PLAN OF CARE
Patient remains free of falls, patient is in bed, bed alarm is on. Writer discussed fall risk and risk of bleeding injury with patient, he agrees to use call light for help to ambulate transfer. Patient has red rashy skin on groin area groin folds. Area washed and nystatin cream placed per order.

## 2023-05-18 NOTE — PROGRESS NOTES
[facility]  Progress Note                   Date:   5/17/2023  Patient name: Brandee Peres  Date of admission:  5/14/2023  1:09 PM  MRN:   [id number]  YOB: 1950  PCP: Justin Carl DO    Reason for Admission: NSTEMI (non-ST elevated myocardial infarction) ([facility] [address] [I21.4]    Subjective:       Clinical Changes /Abnormalities: Stable overnight Denies chest pain or palpitations. Tele/vitals/labs reviewed . Review of Systems    Medications:   Scheduled Meds:   aspirin  81 mg Oral Daily    metoprolol succinate  50 mg Oral Daily    pantoprazole  40 mg Oral Daily    pyridostigmine  60 mg Oral 5x Daily    levETIRAcetam  500 mg Oral BID    amLODIPine  5 mg Oral BID    remdesivir IVPB  100 mg IntraVENous Q24H     Continuous Infusions:   sodium chloride 50 mL/hr at 05/17/23 0610    heparin (PORCINE) Infusion 13 Units/kg/hr (05/17/23 0610)     CBC:   Recent Labs     05/14/23  1708 05/16/23  0109 05/17/23  0429   WBC 16.7* 10.7 7.4   HGB 13.9 12.6* 12.2*    196 223       BMP:    Recent Labs     05/16/23  0109 05/16/23  1752 05/17/23  0429    140 143   K 4.0 3.9 3.8   * 105 108*   CO2 15* 23 23   BUN 33* 28* 23   CREATININE 1.46* 1.34* 1.17   GLUCOSE 103* 116* 96       Hepatic:  No results for input(s): AST, ALT, ALB, BILITOT, ALKPHOS in the last 72 hours. Troponin:   Recent Labs     05/14/23  1438   TROPHS 55*       BNP: No results for input(s): BNP in the last 72 hours. Lipids: No results for input(s): CHOL, HDL in the last 72 hours. Invalid input(s): LDLCALCU  INR: No results for input(s): INR in the last 72 hours. Last EKG:   Last EKG on 5/14/2023 showed right bundle branch block, ST depression in lead I and II, ST elevation in lead aVR     Last Echo:   Estimated ejection fraction is 55 % . Mildly increased wall thickness of the left ventricle. Evidence of diastolic dysfunction.   Sclerotic aortic valve with focal calcification and thickening seen on
[facility] [address] Patient  Today's Date and Time: 5/15/2023, 9:11 AM    Impression :     COVID 19 Confirmed Infection  Covid tests:  5-14-23: Positive  Vaccination status  Partially vaccinated - Received 2 doses  NSTEMI  CKD  Seizures  Recommendations:   Antibiotic treatment:  Monitor off antibiotics  Covid Rx:    Remdesivir: Requested 5-14-23. Stop date 5-18-23  Decadron: Not indicated  Actemra: Not indicated  Paxlovid -Likely outside window    Medical Decision Making/Summary/Discussion:5/15/2023     Patient admitted with COVID 19 infection    Infection Control Recommendations   [place] Precautions  Airborne isolation  Droplet Isolation  Isolate until 5-24-23    Antimicrobial Stewardship Recommendations     Discontinuation of therapy  Coordination of Outpatient Care:   Estimated Length of IV antimicrobials:TBD  Patient will need Midline Catheter Insertion: TBD  Patient will need PICC line Insertion: No  Patient will need: Home IV , [place],  SNF,  LTAC:TBD  Patient will need outpatient wound care:No    Chief complaint/reason for consultation:   Concern for COVID infection      History of Present Illness:   Sudheer Amato is a 71y.o.-year-old  male who was initially admitted on 5/14/2023. Patient seen at the request of . INITIAL HISTORY:    Patient presented through ER with complaints of nasal and head congestion, fatigue, decreased appetite, cough, nausea and vomiting of a week's duration. He had been seen in ER a week before because of of a seizure for which he was started on Keppra. He has a Hx of prior seizures but had not experienced an episode in over two years. He has a past Hx of CKD, metabolic syndrome, essential HTN, myasthenia gravis. In ER elevated troponin suggested NSTEMI. Patient was started on heparin drip. His Covid test was positive. He had 2 prior vaccines.     Patient admitted because of concerns
[facility]  Progress Note                   Date:   5/16/2023  Patient name: Martina Mays  Date of admission:  5/14/2023  1:09 PM  MRN:   [id number]  YOB: 1950  PCP: Yazan Lozano DO    Reason for Admission: NSTEMI (non-ST elevated myocardial infarction) ([facility] [address] [I21.4]    Subjective:       Clinical Changes /Abnormalities:Patient seen and examined. Denies chest pain or shortness of breath. Tele/vitals/labs reviewed . Discussed case with RN. Review of Systems    Medications:   Scheduled Meds:   aspirin  81 mg Oral Daily    metoprolol succinate  50 mg Oral Daily    pantoprazole  40 mg Oral Daily    pyridostigmine  60 mg Oral 5x Daily    levETIRAcetam  500 mg Oral BID    amLODIPine  5 mg Oral BID    remdesivir IVPB  100 mg IntraVENous Q24H     Continuous Infusions:   sodium bicarbonate infusion 100 mL/hr at 05/16/23 0621    heparin (PORCINE) Infusion 15 Units/kg/hr (05/16/23 1346)     CBC:   Recent Labs     05/14/23  1352 05/14/23  1708 05/16/23  0109   WBC 18.4* 16.7* 10.7   HGB 15.7 13.9 12.6*    195 196     BMP:    Recent Labs     05/15/23  0736 05/15/23  1342 05/16/23  0109    138 137   K 5.0 5.1 4.0   * 112* 110*   CO2 9* 15* 15*   BUN 36* 35* 33*   CREATININE 1.46* 1.66* 1.46*   GLUCOSE 112* 122* 103*     Hepatic:  Recent Labs     05/14/23  1352   AST 21   ALT 33   BILITOT 1.1   ALKPHOS 169*     Troponin:   Recent Labs     05/14/23  1352 05/14/23  1438   TROPHS 58* 55*     BNP: No results for input(s): BNP in the last 72 hours. Lipids: No results for input(s): CHOL, HDL in the last 72 hours. Invalid input(s): LDLCALCU  INR: No results for input(s): INR in the last 72 hours. Last EKG:   Last EKG on 5/14/2023 showed right bundle branch block, ST depression in lead I and II, ST elevation in lead aVR     Last Echo:   Estimated ejection fraction is 55 % . Mildly increased wall thickness of the left ventricle.   Evidence of diastolic
NEPHROLOGY PROGRESS NOTE      ASSESSMENT     #1 chronic kidney disease stage IIIb secondary to nephrosclerosis with baseline creatinine 2.6-2.9  #2 metabolic acidosis secondary to chronic kidney disease/acute diarrheal disease  #3 COVID-19 infection  #4 myasthenia gravis  #5 essential hypertension    PLAN     Discontinue IV fluids once oral intake optimized  Follow-up in 4 to 6 weeks time  We will sign off      SUBJECTIVE   Presented with asthenia/nausea followed by episode of seizure few days back. Noted to have COVID-19 infection and profound acidosis on background history of chronic kidney disease stage IIIb. No acute hemodynamic issues overnight. On IV fluids isotonic saline  Bicarb corrected as well as renal function  Appetite decent. Afia Coffee to be discharged. OBJECTIVE     Vitals:    05/17/23 0743 05/17/23 0744 05/17/23 0800 05/17/23 0829   BP:   132/66 132/66   Pulse: 83 68 66 67   Resp: 17 27 19    Temp:       TempSrc:       SpO2: 100% 97% 96%    Weight:       Height:         24HR INTAKE/OUTPUT:    Intake/Output Summary (Last 24 hours) at 5/17/2023 1120  Last data filed at 5/17/2023 0610  Gross per 24 hour   Intake 849.34 ml   Output --   Net 849.34 ml       General appearance:Awake, alert, in no acute distress  Respiratory[de-identified] Not in respiratory distress  Cardiovascular:S1 S2 normal,  Abdomen:non distended. B  Extremities: Lower extremity edema  Neurological:Alert and oriented. No abnormalities of mood, affect, memory, mentation, or behavior are noted      MEDICATIONS     Scheduled Meds:    aspirin  81 mg Oral Daily    metoprolol succinate  50 mg Oral Daily    pantoprazole  40 mg Oral Daily    pyridostigmine  60 mg Oral 5x Daily    levETIRAcetam  500 mg Oral BID    amLODIPine  5 mg Oral BID    remdesivir IVPB  100 mg IntraVENous Q24H     Continuous Infusions:    sodium chloride 50 mL/hr at 05/17/23 0610    heparin (PORCINE) Infusion 13 Units/kg/hr (05/17/23 0610)     PRN Meds:  loperamide, ondansetron,
NEPHROLOGY PROGRESS NOTE      ASSESSMENT     #1 chronic kidney disease stage IIIb secondary to nephrosclerosis with baseline creatinine 6.2-3.2  #2 metabolic acidosis secondary to chronic kidney disease/acute diarrheal disease  #3 COVID-19 infection  #4 myasthenia gravis  #5 essential hypertension    PLAN     #1 recheck BMP in the evening and inform nephrology  #2 continue IV fluids containing bicarb  #3 Will follow      SUBJECTIVE   Presented with asthenia/nausea followed by episode of seizure few days back. Noted to have COVID-19 infection and profound acidosis on background history of chronic kidney disease stage IIIb. Currently being managed with IV fluids containing bicarb. Improvement in the bicarb noted. He overall feels okay. No shortness of breath. No acute hemodynamic issues overnight. OBJECTIVE     Vitals:    05/16/23 0400 05/16/23 0823 05/16/23 0950 05/16/23 1139   BP: 129/61 (!) 133/92 (!) 130/92 (!) 140/73   Pulse: 71 85 79 84   Resp: 21 24 25 19   Temp: 98.1 °F (36.7 °C) 98.1 °F (36.7 °C)  98 °F (36.7 °C)   TempSrc: Oral Oral  Axillary   SpO2: 99% 97% 100% 100%   Weight:       Height:         24HR INTAKE/OUTPUT:    Intake/Output Summary (Last 24 hours) at 5/16/2023 1203  Last data filed at 5/16/2023 6037  Gross per 24 hour   Intake 2090.55 ml   Output 650 ml   Net 1440.55 ml       General appearance:Awake, alert, in no acute distress  Respiratory[de-identified] Not in respiratory distress  Cardiovascular:S1 S2 normal,  Abdomen:non distended. B  Extremities: Lower extremity edema  Neurological:Alert and oriented. No abnormalities of mood, affect, memory, mentation, or behavior are noted      MEDICATIONS     Scheduled Meds:    aspirin  81 mg Oral Daily    metoprolol succinate  50 mg Oral Daily    pantoprazole  40 mg Oral Daily    pyridostigmine  60 mg Oral 5x Daily    levETIRAcetam  500 mg Oral BID    amLODIPine  5 mg Oral BID    remdesivir IVPB  100 mg IntraVENous Q24H     Continuous Infusions:    sodium
Physical Therapy  Facility/Department: [facility]  Physical Therapy Initial Assessment    Name: Rosetta Velazquez  : 1950  MRN: [id number]  Date of Service: 2023    Discharge Recommendations: Further therapy recommended at discharge. Chief Complaint   Patient presents with    Fatigue    Emesis      71 y.o. male who presents with generalized fatigue, nausea, vomiting for the past week. Patient has had associated decrease in appetite, decrease in oral intake. Patient states he has had recent cough, congestion. Denies fever, chills, chest pain, shortness of breath, abdominal pain. Patient has past history of seizures, however had not had a seizure in the past 2 years. At that time patient was placed on Keppra. Patient states he has been taking his Keppra without any missed doses. The patient states that he stopped taking his  Arcelia Gagnon MD)      PT Equipment Recommendations  Equipment Needed: No ([facility])      Patient Diagnosis(es): The encounter diagnosis was NSTEMI (non-ST elevated myocardial infarction) ([facility] [address] Past Medical History:  has a past medical history of Arthritis, Chronic kidney disease, Dysmetabolic syndrome, Hypertension, Iron deficiency anemia, unspecified, Movement disorder, Myasthenia gravis ([facility] [address] Proteinuria, and Seizures ([facility] [address] Past Surgical History:  has a past surgical history that includes Quadraceps tendon repair; knee surgery; Cholecystectomy (12/10/2022); and Cholecystectomy, laparoscopic (N/A, 12/10/2022). Assessment   Body Structures, Functions, Activity Limitations Requiring Skilled Therapeutic Intervention: Decreased functional mobility ; Decreased strength;Decreased ADL status; Decreased endurance;Decreased safe awareness;Decreased body mechanics; Decreased high-level IADLs;Decreased balance;Decreased coordination  Assessment: Pt ambulates 35 ft with SPC and CGA. pt should be safe to return to prior living situation with intermittent support as needed.  pt
Progress Note(Hospital)    STVZ 5C STEPDOWN     Admition Status:      CC:Fatigue and Emesis      From Allied Health:no issues overnight    HCC:  This patient is currently doing pretty well he is seated at the side of the bed. No current complaints. We did review his lab work with him.patient states he is feeling much better but not resting very well.                  Current Facility-Administered Medications:     0.9 % sodium chloride infusion, , IntraVENous, Continuous, Joseph Aguilera MD, Last Rate: 50 mL/hr at 05/17/23 0610, Rate Verify at 05/17/23 0610    loperamide (IMODIUM) capsule 2 mg, 2 mg, Oral, 4x Daily PRN, Milta Ishmael Neverauskas, DO, 2 mg at 05/16/23 2301    ondansetron (ZOFRAN) injection 4 mg, 4 mg, IntraVENous, Q4H PRN, Milta Ishmael Neverauskas, DO    heparin (porcine) PF injection 4,000 Units, 4,000 Units, IntraVENous, PRN, Candy Velasquez MD    heparin (porcine) PF injection 2,000 Units, 2,000 Units, IntraVENous, PRN, Candy Velasquez MD, 2,000 Units at 05/16/23 1345    heparin 25,000 units in dextrose 5% 250 mL (premix) infusion, 5-30 Units/kg/hr, IntraVENous, Continuous, Candy Velasquez MD, Last Rate: 9.7 mL/hr at 05/17/23 0610, 13 Units/kg/hr at 05/17/23 0610    aspirin chewable tablet 81 mg, 81 mg, Oral, Daily, Milta Ishmael Neverauskas, DO, 81 mg at 05/17/23 0829    metoprolol succinate (TOPROL XL) extended release tablet 50 mg, 50 mg, Oral, Daily, Milta Ishmael Neverauskas, DO, 50 mg at 05/17/23 0829    pantoprazole (PROTONIX) tablet 40 mg, 40 mg, Oral, Daily, Milta Ishmael Neverauskas, DO, 40 mg at 05/17/23 0829    pyridostigmine (MESTINON) tablet 60 mg, 60 mg, Oral, 5x Daily, Milta Ishmael Neverauskas, DO, 60 mg at 05/17/23 1146    levETIRAcetam (KEPPRA) tablet 500 mg, 500 mg, Oral, BID, Milta Ishmael Neverauskas, DO, 500 mg at 05/17/23 0829    amLODIPine (NORVASC) tablet 5 mg, 5 mg, Oral, BID, Milta Ishmael Neverauskas, DO, 5 mg at 05/17/23 0829    [COMPLETED] remdesivir 200 mg in sodium chloride 0.9 % 250 mL
Progress Note(Hospital)    STVZ 5C STEPDOWN     Admition Status:      CC:Fatigue and Emesis      From Allied Health:patient has some inguinal rash and some cream has been ordered. HCC:  Patient currently doing pretty well today. Nephrology has signed off and does want to do a follow-up with him. Cardiology will complete work-up as an outpatient. Infectious disease has completed their investigation and treatment today is the last day for the antiviral.  Patient is to go home with his brother and his daughter Cici Lynn will be checking in on them.             Current Facility-Administered Medications:     nystatin (MYCOSTATIN) cream, , Topical, BID, Arlean Hands Neverauskas, DO, Given at 05/18/23 0853    0.9 % sodium chloride infusion, , IntraVENous, Continuous, Joseph Stanton MD, Last Rate: 50 mL/hr at 05/17/23 2225, Rate Verify at 05/17/23 2225    loperamide (IMODIUM) capsule 2 mg, 2 mg, Oral, 4x Daily PRN, Arlean Hands Neverauskas, DO, 2 mg at 05/16/23 2301    ondansetron (ZOFRAN) injection 4 mg, 4 mg, IntraVENous, Q4H PRN, Arlean Hands Neverauskas, DO    aspirin chewable tablet 81 mg, 81 mg, Oral, Daily, Arlean Hands Neverauskas, DO, 81 mg at 05/18/23 0853    metoprolol succinate (TOPROL XL) extended release tablet 50 mg, 50 mg, Oral, Daily, Arlean Hands Neverauskas, DO, 50 mg at 05/18/23 0853    pantoprazole (PROTONIX) tablet 40 mg, 40 mg, Oral, Daily, Arlean Hands Neverauskas, DO, 40 mg at 05/18/23 0853    pyridostigmine (MESTINON) tablet 60 mg, 60 mg, Oral, 5x Daily, Arlean Hands Neverauskas, DO, 60 mg at 05/18/23 0853    levETIRAcetam (KEPPRA) tablet 500 mg, 500 mg, Oral, BID, Arlean Hands Neverauskas, DO, 500 mg at 05/18/23 0853    amLODIPine (NORVASC) tablet 5 mg, 5 mg, Oral, BID, Arlean Hands Neverauskas, DO, 5 mg at 05/18/23 0648    [COMPLETED] remdesivir 200 mg in sodium chloride 0.9 % 250 mL IVPB, 200 mg, IntraVENous, Once, Stopped at 05/14/23 2236 **FOLLOWED BY** remdesivir 100 mg in sodium chloride 0.9 % 250 mL
Progress Note(Hospital)    STVZ 5C STEPDOWN     Admition Status: Inpatient    CC:Fatigue and Emesis      From Allied Health: Reviewed no issues overnight    HCC:  Patient states he is feeling a lot better today. Has not been too active. Appetite seems to be returning. Nephrology is on board due to the acidosis that has developed.           Current Facility-Administered Medications:     sodium bicarbonate 150 mEq in sterile water 1,000 mL infusion, , IntraVENous, Continuous, Pricilla Santoyo MD, Last Rate: 100 mL/hr at 05/16/23 0621, Rate Verify at 05/16/23 0621    ondansetron (ZOFRAN) injection 4 mg, 4 mg, IntraVENous, Q4H PRN, Chaim Acosta DO    heparin (porcine) PF injection 4,000 Units, 4,000 Units, IntraVENous, PRN, Daniel Cooley MD    heparin (porcine) PF injection 2,000 Units, 2,000 Units, IntraVENous, PRN, Daniel Cooley MD, 2,000 Units at 05/15/23 0915    heparin 25,000 units in dextrose 5% 250 mL (premix) infusion, 5-30 Units/kg/hr, IntraVENous, Continuous, Daniel Cooley MD, Last Rate: 9.7 mL/hr at 05/16/23 0621, 13 Units/kg/hr at 05/16/23 9876    aspirin chewable tablet 81 mg, 81 mg, Oral, Daily, Chaim Acosta, DO, 81 mg at 05/16/23 0950    metoprolol succinate (TOPROL XL) extended release tablet 50 mg, 50 mg, Oral, Daily, Chaim Sidhukas, DO, 50 mg at 05/16/23 0950    pantoprazole (PROTONIX) tablet 40 mg, 40 mg, Oral, Daily, Pegedita Ramires Neverdevorahkas, DO, 40 mg at 05/16/23 0949    pyridostigmine (MESTINON) tablet 60 mg, 60 mg, Oral, 5x Daily, Chaim Bowers, DO, 60 mg at 05/16/23 0949    levETIRAcetam (KEPPRA) tablet 500 mg, 500 mg, Oral, BID, Chaim Acosta, DO, 500 mg at 05/16/23 0949    amLODIPine (NORVASC) tablet 5 mg, 5 mg, Oral, BID, Peggi Keyla Acosta, DO, 5 mg at 05/16/23 0950    [COMPLETED] remdesivir 200 mg in sodium chloride 0.9 % 250 mL IVPB, 200 mg, IntraVENous, Once, Stopped at 05/14/23 0596 **FOLLOWED BY** remdesivir 100 mg in sodium
Goals  Time Frame for Short Term Goals: 14 visits  Short Term Goal 1: Complete transfers with SPC and mod I  Short Term Goal 2: Complete 300 ft of gait with SPC and mod I  Short Term Goal 3: Complete 8 steps with LHR and mod I  Short Term Goal 4: Participate in 30 minutes of therapy to promote endurance         Therapy Time   Individual Concurrent Group Co-treatment   Time In 1021         Time Out 1047         Minutes 26         Timed Code Treatment Minutes: 32 Minutes       Marvin Recinos, PTA
Systems:     Constitutional: No fevers or chills. Generalized fatigue Decreased appetite  Head: No headaches  Eyes: No double vision or blurry vision. No conjunctival inflammation. ENT: No sore throat or runny nose. . No hearing loss, tinnitus or vertigo. Feeling of congestion  Cardiovascular: No chest pain or palpitations. No Shortness of breath. No JENKINS  Lung: No Shortness of breath . Reports cough. No sputum production  Abdomen: Nausea, vomiting. No diarrhea, or abdominal pain. [place] No cramps. Genitourinary: No increased urinary frequency, or dysuria. No hematuria. No suprapubic or CVA pain  Musculoskeletal: No muscle aches or pains. No joint effusions, swelling or deformities  Hematologic: No bleeding or bruising. Neurologic: No headache, weakness, numbness, or tingling. Integument: No rash, no ulcers. Psychiatric: No depression. Endocrine: No polyuria, no polydipsia, no polyphagia. Physical Examination :   Patient Vitals for the past 8 hrs:   BP Temp Temp src Pulse Resp SpO2   05/16/23 0400 129/61 98.1 °F (36.7 °C) Oral 71 21 99 %     General Appearance: Awake, alert, and in no apparent distress  Head:  Normocephalic, no trauma  Eyes: Pupils equal, round, reactive to light; sclera anicteric; conjunctivae pink. No embolic phenomena. ENT: Oropharynx clear, without erythema, exudate, or thrush. No tenderness of sinuses. Mouth/throat: mucosa pink and moist. No lesions. Dentition in good repair. Neck:Supple, without lymphadenopathy. Thyroid normal, No bruits. Pulmonary/Chest: Clear to auscultation, without wheezes, rales, or rhonchi. No dullness to percussion. Cardiovascular: Regular rate and rhythm without murmurs, rubs, or gallops. Abdomen: Soft, non tender. Bowel sounds normal. No organomegaly  All four Extremities: No cyanosis, clubbing, edema, or effusions. Neurologic: No gross sensory or motor deficits. Skin: Warm and dry with good turgor. No signs of peripheral arterial or venous insufficiency.  No
reviewed. No pertinent family history. Allergies:   Morphine, Pcn [penicillins], and Sulfa antibiotics     Review of Systems:     Constitutional: No fevers or chills. Generalized fatigue Decreased appetite  Head: No headaches  Eyes: No double vision or blurry vision. No conjunctival inflammation. ENT: No sore throat or runny nose. . No hearing loss, tinnitus or vertigo. Feeling of congestion  Cardiovascular: No chest pain or palpitations. No Shortness of breath. No JENKINS  Lung: No Shortness of breath . Reports cough. No sputum production  Abdomen: Nausea, vomiting. No diarrhea, or abdominal pain. [place] No cramps. Genitourinary: No increased urinary frequency, or dysuria. No hematuria. No suprapubic or CVA pain  Musculoskeletal: No muscle aches or pains. No joint effusions, swelling or deformities  Hematologic: No bleeding or bruising. Neurologic: No headache, weakness, numbness, or tingling. Integument: No rash, no ulcers. Psychiatric: No depression. Endocrine: No polyuria, no polydipsia, no polyphagia. Physical Examination :   Patient Vitals for the past 8 hrs:   BP Temp Temp src Pulse Resp SpO2   05/17/23 1145 128/77 97.7 °F (36.5 °C) Oral 80 15 99 %   05/17/23 0829 132/66 -- -- 67 -- --   05/17/23 0800 132/66 -- -- 66 19 96 %   05/17/23 0744 -- -- -- 68 27 97 %   05/17/23 0743 -- -- -- 83 17 100 %   05/17/23 0742 130/68 97.9 °F (36.6 °C) Oral 74 22 96 %   05/17/23 0741 -- -- -- 75 (!) 31 96 %   05/17/23 0740 -- -- -- 75 14 100 %     General Appearance: Awake, alert, and in no apparent distress  Head:  Normocephalic, no trauma  Eyes: Pupils equal, round, reactive to light; sclera anicteric; conjunctivae pink. No embolic phenomena. ENT: Oropharynx clear, without erythema, exudate, or thrush. No tenderness of sinuses. Mouth/throat: mucosa pink and moist. No lesions. Dentition in good repair. Neck:Supple, without lymphadenopathy. Thyroid normal, No bruits.   Pulmonary/Chest: Clear to auscultation, without

## 2023-05-18 NOTE — PLAN OF CARE
Problem: Discharge Planning  Goal: Discharge to home or other facility with appropriate resources  5/18/2023 1157 by Enrique Ornelas RN  Outcome: Progressing  Flowsheets  Taken 5/18/2023 1155  Discharge to home or other facility with appropriate resources: Identify barriers to discharge with patient and caregiver  Taken 5/18/2023 0735  Discharge to home or other facility with appropriate resources: Identify barriers to discharge with patient and caregiver  5/17/2023 2247 by Rober Cordon RN  Outcome: Progressing  Flowsheets (Taken 5/17/2023 2000)  Discharge to home or other facility with appropriate resources:   Identify barriers to discharge with patient and caregiver   Arrange for needed discharge resources and transportation as appropriate   Identify discharge learning needs (meds, wound care, etc)   Arrange for interpreters to assist at discharge as needed   Refer to discharge planning if patient needs post-hospital services based on physician order or complex needs related to functional status, cognitive ability or social support system     Problem: Safety - Adult  Goal: Free from fall injury  Recent Flowsheet Documentation  Taken 5/18/2023 0931 by Enrique Ornelas RN  Free From Fall Injury: Instruct family/caregiver on patient safety  5/17/2023 2247 by Rober Cordon RN  Outcome: Progressing     Problem: ABCDS Injury Assessment  Goal: Absence of physical injury  Recent Flowsheet Documentation  Taken 5/18/2023 0931 by Enrique Ornelas RN  Absence of Physical Injury: Implement safety measures based on patient assessment  5/17/2023 2247 by Rober Cordon RN  Outcome: Progressing     Problem: Skin/Tissue Integrity  Goal: Absence of new skin breakdown  Description: 1. Monitor for areas of redness and/or skin breakdown  2. Assess vascular access sites hourly  3. Every 4-6 hours minimum:  Change oxygen saturation probe site  4.   Every 4-6 hours:  If on nasal continuous positive airway pressure, respiratory

## 2023-05-18 NOTE — DISCHARGE SUMMARY
the assistance of a speech-recognition program. Although the intention is to generate a document that actually reflects the content of the visit, no guarantees can be provided that every mistake has been identified and corrected by editing.
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
## 2023-09-18 DIAGNOSIS — I83.813 VARICOSE VEINS OF BOTH LOWER EXTREMITIES WITH PAIN: Primary | ICD-10-CM

## 2023-09-18 DIAGNOSIS — I73.9 PERIPHERAL VASCULAR DISEASE, UNSPECIFIED (HCC): ICD-10-CM

## 2023-09-22 ENCOUNTER — HOSPITAL ENCOUNTER (OUTPATIENT)
Dept: VASCULAR LAB | Age: 73
End: 2023-09-22
Attending: SURGERY
Payer: COMMERCIAL

## 2023-09-22 DIAGNOSIS — I73.9 PERIPHERAL VASCULAR DISEASE, UNSPECIFIED (HCC): ICD-10-CM

## 2023-09-22 DIAGNOSIS — I83.813 VARICOSE VEINS OF BOTH LOWER EXTREMITIES WITH PAIN: ICD-10-CM

## 2023-09-22 LAB
VAS LEFT ABI: 1.12
VAS LEFT ARM BP: 146 MMHG
VAS LEFT DORSALIS PEDIS BP: 163 MMHG
VAS LEFT PTA BP: 155 MMHG
VAS LEFT TBI: 0.71
VAS LEFT TOE PRESSURE: 103 MMHG
VAS RIGHT ABI: 1.06
VAS RIGHT ARM BP: 143 MMHG
VAS RIGHT DORSALIS PEDIS BP: 149 MMHG
VAS RIGHT PTA BP: 155 MMHG
VAS RIGHT TBI: 0.77
VAS RIGHT TOE PRESSURE: 113 MMHG

## 2023-09-22 PROCEDURE — 93970 EXTREMITY STUDY: CPT

## 2023-09-22 PROCEDURE — 93970 EXTREMITY STUDY: CPT | Performed by: SURGERY

## 2023-09-22 PROCEDURE — 93923 UPR/LXTR ART STDY 3+ LVLS: CPT | Performed by: SURGERY

## 2023-09-22 PROCEDURE — 93923 UPR/LXTR ART STDY 3+ LVLS: CPT

## 2023-09-23 LAB
VAS LEFT GSV AT KNEE DIAM: 2 MM
VAS LEFT GSV BK MID DIAM: 2.2 MM
VAS LEFT GSV JUNC DIAM: 7.3 MM
VAS LEFT GSV THIGH MID DIAM: 2.7 MM
VAS LEFT GSV THIGH PROX DIAM: 4.1 MM
VAS LEFT SSV MID DIAM: 1.2 MM
VAS LEFT SSV PROX DIAM: 0.8 MM
VAS RIGHT GSV AT KNEE DIAM: 1.7 MM
VAS RIGHT GSV BK MID DIAM: 2.4 MM
VAS RIGHT GSV JUNC DIAM: 4.4 MM
VAS RIGHT GSV THIGH MID DIAM: 2.8 MM
VAS RIGHT GSV THIGH PROX DIAM: 3.5 MM
VAS RIGHT SSV MID DIAM: 2.3 MM
VAS RIGHT SSV PROX DIAM: 2.5 MM

## 2023-09-29 ENCOUNTER — OFFICE VISIT (OUTPATIENT)
Dept: VASCULAR SURGERY | Age: 73
End: 2023-09-29
Payer: COMMERCIAL

## 2023-09-29 VITALS
BODY MASS INDEX: 21.76 KG/M2 | RESPIRATION RATE: 18 BRPM | HEART RATE: 86 BPM | OXYGEN SATURATION: 99 % | SYSTOLIC BLOOD PRESSURE: 132 MMHG | TEMPERATURE: 97.3 F | WEIGHT: 152 LBS | HEIGHT: 70 IN | DIASTOLIC BLOOD PRESSURE: 70 MMHG

## 2023-09-29 DIAGNOSIS — G60.3 IDIOPATHIC PROGRESSIVE NEUROPATHY: Primary | ICD-10-CM

## 2023-09-29 PROCEDURE — 1123F ACP DISCUSS/DSCN MKR DOCD: CPT | Performed by: SURGERY

## 2023-09-29 PROCEDURE — 99203 OFFICE O/P NEW LOW 30 MIN: CPT | Performed by: SURGERY

## 2023-09-29 PROCEDURE — 3078F DIAST BP <80 MM HG: CPT | Performed by: SURGERY

## 2023-09-29 PROCEDURE — 3075F SYST BP GE 130 - 139MM HG: CPT | Performed by: SURGERY

## 2023-09-29 RX ORDER — UBIDECARENONE 75 MG
50 CAPSULE ORAL DAILY
COMMUNITY

## 2023-09-29 RX ORDER — CLOPIDOGREL BISULFATE 75 MG/1
75 TABLET ORAL DAILY
COMMUNITY

## 2023-09-29 ASSESSMENT — ENCOUNTER SYMPTOMS
EYE PAIN: 0
COLOR CHANGE: 0
ABDOMINAL PAIN: 0
TROUBLE SWALLOWING: 0
CHEST TIGHTNESS: 0
SHORTNESS OF BREATH: 0
COUGH: 0
VOMITING: 0
ABDOMINAL DISTENTION: 0
VOICE CHANGE: 0

## 2023-09-29 NOTE — PROGRESS NOTES
[address] [facility] [address] [address]  Dept: [phone]     Patient: Darian Mccarthy  : 1950  MRN: [id number]  DOS: 2023    Referring provider:  Mellissa Vuong MD         HPI:  Darian Mccarthy is a 68 y.o. male who comes to the office for the first time regarding bilateral lower extremity burning and color changes. He has a tube stocking distribution of burning in both lower extremities which is consistent with neuropathy. He denies claudication. He has no rest pain. He has no ulceration. Arterial examination with DEEDEE and PVR has been normal with normal waveforms. Lower extremity venous duplex with chronic venous insufficiency protocol was also normal without evidence of reflux. He does have myasthenia gravis. I do not know if this corresponds to sensory neuropathy as well. He used to smoke long ago. He does not know of any coronary problems and never had a heart attack. Does not get chest pain. [facility] not related to extracranial carotid disease. Past Medical History:   Diagnosis Date    Arthritis     Chronic kidney disease     Dysmetabolic syndrome     Hypertension     Iron deficiency anemia, unspecified     Movement disorder     Myasthenia gravis ([address])     Proteinuria     Seizures ([address])      No family history on file.    Social History     Socioeconomic History    Marital status:      Spouse name: Not on file    Number of children: Not on file    Years of education: Not on file    Highest education level: Not on file   Occupational History    Not on file   Tobacco Use    Smoking status: Former     Packs/day: 1     Types: Cigarettes    Smokeless tobacco: Never   Substance and Sexual Activity    Alcohol use: No    Drug use: No    Sexual activity: Not on file   Other Topics Concern    Not on file   Social History

## 2023-10-13 ENCOUNTER — APPOINTMENT (OUTPATIENT)
Dept: GENERAL RADIOLOGY | Age: 73
DRG: 100 | End: 2023-10-13
Payer: COMMERCIAL

## 2023-10-13 ENCOUNTER — HOSPITAL ENCOUNTER (INPATIENT)
Age: 73
LOS: 2 days | Discharge: HOME | DRG: 100 | End: 2023-10-15
Attending: EMERGENCY MEDICINE | Admitting: INTERNAL MEDICINE
Payer: COMMERCIAL

## 2023-10-13 ENCOUNTER — APPOINTMENT (OUTPATIENT)
Dept: CT IMAGING | Age: 73
DRG: 100 | End: 2023-10-13
Payer: COMMERCIAL

## 2023-10-13 DIAGNOSIS — G40.919 BREAKTHROUGH SEIZURE (HCC): Primary | ICD-10-CM

## 2023-10-13 DIAGNOSIS — N17.9 AKI (ACUTE KIDNEY INJURY) (HCC): ICD-10-CM

## 2023-10-13 DIAGNOSIS — G70.00 MYASTHENIA GRAVIS (HCC): ICD-10-CM

## 2023-10-13 DIAGNOSIS — I47.10 PAROXYSMAL SUPRAVENTRICULAR TACHYCARDIA: ICD-10-CM

## 2023-10-13 DIAGNOSIS — R79.89 ELEVATED TROPONIN: ICD-10-CM

## 2023-10-13 DIAGNOSIS — I10 HYPERTENSION: ICD-10-CM

## 2023-10-13 PROBLEM — E87.6 HYPOKALEMIA: Status: ACTIVE | Noted: 2023-10-13

## 2023-10-13 PROBLEM — D72.829 LEUKOCYTOSIS: Status: ACTIVE | Noted: 2023-10-13

## 2023-10-13 PROBLEM — N18.9 ACUTE KIDNEY INJURY SUPERIMPOSED ON CKD (HCC): Status: ACTIVE | Noted: 2023-10-13

## 2023-10-13 PROBLEM — G93.41 ACUTE METABOLIC ENCEPHALOPATHY: Status: ACTIVE | Noted: 2023-10-13

## 2023-10-13 PROBLEM — E87.20 LACTIC ACIDOSIS: Status: ACTIVE | Noted: 2023-10-13

## 2023-10-13 PROBLEM — G40.409 GENERALIZED TONIC-CLONIC SEIZURE (HCC): Status: ACTIVE | Noted: 2023-10-13

## 2023-10-13 PROBLEM — E87.29 HIGH ANION GAP METABOLIC ACIDOSIS: Status: ACTIVE | Noted: 2023-10-13

## 2023-10-13 LAB
ALBUMIN SERPL-MCNC: 3.8 G/DL (ref 3.5–5.2)
ALBUMIN/GLOB SERPL: 1.4 {RATIO} (ref 1–2.5)
ALP SERPL-CCNC: 122 U/L (ref 40–129)
ALT SERPL-CCNC: 14 U/L (ref 5–41)
ANION GAP SERPL CALCULATED.3IONS-SCNC: 27 MMOL/L (ref 9–17)
AST SERPL-CCNC: 20 U/L
B-OH-BUTYR SERPL-MCNC: 0.25 MMOL/L (ref 0.02–0.27)
BASOPHILS # BLD: 0.09 K/UL (ref 0–0.2)
BASOPHILS NFR BLD: 0 % (ref 0–2)
BILIRUB SERPL-MCNC: 0.4 MG/DL (ref 0.3–1.2)
BILIRUB UR QL STRIP: NEGATIVE
BNP SERPL-MCNC: 1375 PG/ML
BODY TEMPERATURE: 37
BUN BLD-MCNC: 20 MG/DL (ref 8–26)
BUN SERPL-MCNC: 21 MG/DL (ref 8–23)
CA-I BLD-SCNC: 1.31 MMOL/L (ref 1.15–1.33)
CALCIUM SERPL-MCNC: 8.5 MG/DL (ref 8.6–10.4)
CASTS #/AREA URNS LPF: ABNORMAL /LPF (ref 0–8)
CHLORIDE BLD-SCNC: 115 MMOL/L (ref 98–107)
CHLORIDE SERPL-SCNC: 109 MMOL/L (ref 98–107)
CK SERPL-CCNC: 79 U/L (ref 39–308)
CLARITY UR: CLEAR
CO2 BLD CALC-SCNC: 11 MMOL/L (ref 22–30)
CO2 SERPL-SCNC: 8 MMOL/L (ref 20–31)
COHGB MFR BLD: 1.5 % (ref 0–5)
COLOR UR: YELLOW
CREAT SERPL-MCNC: 1.7 MG/DL (ref 0.7–1.2)
D DIMER PPP FEU-MCNC: 1.7 UG/ML FEU (ref 0–0.57)
EGFR, POC: 39 ML/MIN/1.73M2
EOSINOPHIL # BLD: 0.17 K/UL (ref 0–0.44)
EOSINOPHILS RELATIVE PERCENT: 1 % (ref 1–4)
EPI CELLS #/AREA URNS HPF: ABNORMAL /HPF (ref 0–5)
ERYTHROCYTE [DISTWIDTH] IN BLOOD BY AUTOMATED COUNT: 14 % (ref 11.8–14.4)
FIO2 ON VENT: ABNORMAL %
GFR SERPL CREATININE-BSD FRML MDRD: 42 ML/MIN/1.73M2
GLUCOSE BLD-MCNC: 186 MG/DL (ref 74–100)
GLUCOSE SERPL-MCNC: 196 MG/DL (ref 70–99)
GLUCOSE UR STRIP-MCNC: NEGATIVE MG/DL
HCO3 VENOUS: 10.7 MMOL/L (ref 22–29)
HCO3 VENOUS: 20.1 MMOL/L (ref 24–30)
HCT VFR BLD AUTO: 44 % (ref 41–53)
HCT VFR BLD AUTO: 44.4 % (ref 40.7–50.3)
HGB BLD-MCNC: 13 G/DL (ref 13–17)
HGB UR QL STRIP.AUTO: NEGATIVE
IMM GRANULOCYTES # BLD AUTO: 0.25 K/UL (ref 0–0.3)
IMM GRANULOCYTES NFR BLD: 1 %
KETONES UR STRIP-MCNC: NEGATIVE MG/DL
LACTIC ACID, WHOLE BLOOD: 1.4 MMOL/L (ref 0.7–2.1)
LACTIC ACID, WHOLE BLOOD: 14.6 MMOL/L (ref 0.7–2.1)
LACTIC ACID, WHOLE BLOOD: 2.4 MMOL/L (ref 0.7–2.1)
LEUKOCYTE ESTERASE UR QL STRIP: NEGATIVE
LEVETIRACETAM SERPL-MCNC: 30 UG/ML
LEVETIRACETAM SERPL-MCNC: 81 UG/ML
LYMPHOCYTES NFR BLD: 1.97 K/UL (ref 1.1–3.7)
LYMPHOCYTES RELATIVE PERCENT: 10 % (ref 24–43)
MAGNESIUM SERPL-MCNC: 2.3 MG/DL (ref 1.6–2.6)
MCH RBC QN AUTO: 28 PG (ref 25.2–33.5)
MCHC RBC AUTO-ENTMCNC: 29.3 G/DL (ref 28.4–34.8)
MCV RBC AUTO: 95.5 FL (ref 82.6–102.9)
MONOCYTES NFR BLD: 1.38 K/UL (ref 0.1–1.2)
MONOCYTES NFR BLD: 7 % (ref 3–12)
MYOGLOBIN SERPL-MCNC: 224 NG/ML (ref 28–72)
NEGATIVE BASE EXCESS, VEN: 19.8 MMOL/L (ref 0–2)
NEGATIVE BASE EXCESS, VEN: 5.4 MMOL/L (ref 0–2)
NEUTROPHILS NFR BLD: 81 % (ref 36–65)
NEUTS SEG NFR BLD: 16.5 K/UL (ref 1.5–8.1)
NITRITE UR QL STRIP: NEGATIVE
NRBC BLD-RTO: 0 PER 100 WBC
O2 SAT, VEN: 88.8 % (ref 60–85)
O2 SAT, VEN: 94.3 % (ref 60–85)
PCO2, VEN: 40.9 MM HG (ref 41–51)
PCO2, VEN: 41.5 MM HG (ref 39–55)
PH UR STRIP: 6 [PH] (ref 5–8)
PH VENOUS: 7.03 (ref 7.32–7.43)
PH VENOUS: 7.31 (ref 7.32–7.42)
PLATELET # BLD AUTO: 253 K/UL (ref 138–453)
PMV BLD AUTO: 11.4 FL (ref 8.1–13.5)
PO2, VEN: 104.5 MM HG (ref 30–50)
PO2, VEN: 60.7 MM HG (ref 30–50)
POC ANION GAP: 23 MMOL/L (ref 7–16)
POC CREATININE: 1.8 MG/DL (ref 0.51–1.19)
POC HEMOGLOBIN (CALC): 15.1 G/DL (ref 13.5–17.5)
POC LACTIC ACID: 17.4 MMOL/L (ref 0.56–1.39)
POTASSIUM BLD-SCNC: 3.3 MMOL/L (ref 3.5–4.5)
POTASSIUM SERPL-SCNC: 3.4 MMOL/L (ref 3.7–5.3)
PROT SERPL-MCNC: 6.5 G/DL (ref 6.4–8.3)
PROT UR STRIP-MCNC: ABNORMAL MG/DL
RBC # BLD AUTO: 4.65 M/UL (ref 4.21–5.77)
RBC #/AREA URNS HPF: ABNORMAL /HPF (ref 0–4)
SODIUM BLD-SCNC: 148 MMOL/L (ref 138–146)
SODIUM SERPL-SCNC: 144 MMOL/L (ref 135–144)
SP GR UR STRIP: 1.01 (ref 1–1.03)
TROPONIN I SERPL HS-MCNC: 40 NG/L (ref 0–22)
TROPONIN I SERPL HS-MCNC: 42 NG/L (ref 0–22)
UROBILINOGEN UR STRIP-ACNC: NORMAL EU/DL (ref 0–1)
WBC #/AREA URNS HPF: ABNORMAL /HPF (ref 0–5)
WBC OTHER # BLD: 20.4 K/UL (ref 3.5–11.3)

## 2023-10-13 PROCEDURE — 96367 TX/PROPH/DG ADDL SEQ IV INF: CPT

## 2023-10-13 PROCEDURE — 80053 COMPREHEN METABOLIC PANEL: CPT

## 2023-10-13 PROCEDURE — 84484 ASSAY OF TROPONIN QUANT: CPT

## 2023-10-13 PROCEDURE — 82805 BLOOD GASES W/O2 SATURATION: CPT

## 2023-10-13 PROCEDURE — 85379 FIBRIN DEGRADATION QUANT: CPT

## 2023-10-13 PROCEDURE — 2060000000 HC ICU INTERMEDIATE R&B

## 2023-10-13 PROCEDURE — 83880 ASSAY OF NATRIURETIC PEPTIDE: CPT

## 2023-10-13 PROCEDURE — 99285 EMERGENCY DEPT VISIT HI MDM: CPT

## 2023-10-13 PROCEDURE — 82550 ASSAY OF CK (CPK): CPT

## 2023-10-13 PROCEDURE — 87040 BLOOD CULTURE FOR BACTERIA: CPT

## 2023-10-13 PROCEDURE — 83605 ASSAY OF LACTIC ACID: CPT

## 2023-10-13 PROCEDURE — 85014 HEMATOCRIT: CPT

## 2023-10-13 PROCEDURE — 6370000000 HC RX 637 (ALT 250 FOR IP)

## 2023-10-13 PROCEDURE — 80177 DRUG SCRN QUAN LEVETIRACETAM: CPT

## 2023-10-13 PROCEDURE — 83874 ASSAY OF MYOGLOBIN: CPT

## 2023-10-13 PROCEDURE — 71260 CT THORAX DX C+: CPT

## 2023-10-13 PROCEDURE — 6360000004 HC RX CONTRAST MEDICATION

## 2023-10-13 PROCEDURE — 84520 ASSAY OF UREA NITROGEN: CPT

## 2023-10-13 PROCEDURE — 6370000000 HC RX 637 (ALT 250 FOR IP): Performed by: STUDENT IN AN ORGANIZED HEALTH CARE EDUCATION/TRAINING PROGRAM

## 2023-10-13 PROCEDURE — 83735 ASSAY OF MAGNESIUM: CPT

## 2023-10-13 PROCEDURE — 70450 CT HEAD/BRAIN W/O DYE: CPT

## 2023-10-13 PROCEDURE — 82010 KETONE BODYS QUAN: CPT

## 2023-10-13 PROCEDURE — 71045 X-RAY EXAM CHEST 1 VIEW: CPT

## 2023-10-13 PROCEDURE — 82330 ASSAY OF CALCIUM: CPT

## 2023-10-13 PROCEDURE — 6360000002 HC RX W HCPCS

## 2023-10-13 PROCEDURE — 36415 COLL VENOUS BLD VENIPUNCTURE: CPT

## 2023-10-13 PROCEDURE — 82565 ASSAY OF CREATININE: CPT

## 2023-10-13 PROCEDURE — 87086 URINE CULTURE/COLONY COUNT: CPT

## 2023-10-13 PROCEDURE — 2580000003 HC RX 258

## 2023-10-13 PROCEDURE — 82803 BLOOD GASES ANY COMBINATION: CPT

## 2023-10-13 PROCEDURE — 80051 ELECTROLYTE PANEL: CPT

## 2023-10-13 PROCEDURE — 96375 TX/PRO/DX INJ NEW DRUG ADDON: CPT

## 2023-10-13 PROCEDURE — 81001 URINALYSIS AUTO W/SCOPE: CPT

## 2023-10-13 PROCEDURE — 96365 THER/PROPH/DIAG IV INF INIT: CPT

## 2023-10-13 PROCEDURE — 82947 ASSAY GLUCOSE BLOOD QUANT: CPT

## 2023-10-13 PROCEDURE — 2580000003 HC RX 258: Performed by: STUDENT IN AN ORGANIZED HEALTH CARE EDUCATION/TRAINING PROGRAM

## 2023-10-13 PROCEDURE — 85025 COMPLETE CBC W/AUTO DIFF WBC: CPT

## 2023-10-13 PROCEDURE — 99222 1ST HOSP IP/OBS MODERATE 55: CPT | Performed by: STUDENT IN AN ORGANIZED HEALTH CARE EDUCATION/TRAINING PROGRAM

## 2023-10-13 PROCEDURE — 96366 THER/PROPH/DIAG IV INF ADDON: CPT

## 2023-10-13 RX ORDER — ONDANSETRON 2 MG/ML
4 INJECTION INTRAMUSCULAR; INTRAVENOUS EVERY 6 HOURS PRN
Status: DISCONTINUED | OUTPATIENT
Start: 2023-10-13 | End: 2023-10-15 | Stop reason: HOSPADM

## 2023-10-13 RX ORDER — SODIUM CHLORIDE 0.9 % (FLUSH) 0.9 %
5-40 SYRINGE (ML) INJECTION PRN
Status: DISCONTINUED | OUTPATIENT
Start: 2023-10-13 | End: 2023-10-15 | Stop reason: HOSPADM

## 2023-10-13 RX ORDER — LORAZEPAM 2 MG/ML
INJECTION INTRAMUSCULAR
Status: COMPLETED
Start: 2023-10-13 | End: 2023-10-13

## 2023-10-13 RX ORDER — LORAZEPAM 2 MG/ML
4 INJECTION INTRAMUSCULAR EVERY 5 MIN PRN
Status: DISCONTINUED | OUTPATIENT
Start: 2023-10-13 | End: 2023-10-15 | Stop reason: HOSPADM

## 2023-10-13 RX ORDER — MAGNESIUM SULFATE IN WATER 40 MG/ML
2000 INJECTION, SOLUTION INTRAVENOUS ONCE
Status: COMPLETED | OUTPATIENT
Start: 2023-10-13 | End: 2023-10-13

## 2023-10-13 RX ORDER — ACETAMINOPHEN 650 MG/1
650 SUPPOSITORY RECTAL EVERY 6 HOURS PRN
Status: DISCONTINUED | OUTPATIENT
Start: 2023-10-13 | End: 2023-10-15 | Stop reason: HOSPADM

## 2023-10-13 RX ORDER — METOPROLOL SUCCINATE 25 MG/1
25 TABLET, EXTENDED RELEASE ORAL 2 TIMES DAILY
Status: DISCONTINUED | OUTPATIENT
Start: 2023-10-13 | End: 2023-10-15 | Stop reason: HOSPADM

## 2023-10-13 RX ORDER — POTASSIUM CHLORIDE 7.45 MG/ML
10 INJECTION INTRAVENOUS ONCE
Status: COMPLETED | OUTPATIENT
Start: 2023-10-13 | End: 2023-10-13

## 2023-10-13 RX ORDER — SODIUM CHLORIDE, SODIUM LACTATE, POTASSIUM CHLORIDE, CALCIUM CHLORIDE 600; 310; 30; 20 MG/100ML; MG/100ML; MG/100ML; MG/100ML
INJECTION, SOLUTION INTRAVENOUS CONTINUOUS
Status: DISCONTINUED | OUTPATIENT
Start: 2023-10-13 | End: 2023-10-15 | Stop reason: HOSPADM

## 2023-10-13 RX ORDER — 0.9 % SODIUM CHLORIDE 0.9 %
1000 INTRAVENOUS SOLUTION INTRAVENOUS ONCE
Status: COMPLETED | OUTPATIENT
Start: 2023-10-13 | End: 2023-10-13

## 2023-10-13 RX ORDER — LORAZEPAM 2 MG/ML
2 INJECTION INTRAMUSCULAR ONCE
Status: COMPLETED | OUTPATIENT
Start: 2023-10-13 | End: 2023-10-13

## 2023-10-13 RX ORDER — SODIUM CHLORIDE 9 MG/ML
INJECTION, SOLUTION INTRAVENOUS PRN
Status: DISCONTINUED | OUTPATIENT
Start: 2023-10-13 | End: 2023-10-15 | Stop reason: HOSPADM

## 2023-10-13 RX ORDER — LEVETIRACETAM 10 MG/ML
2000 INJECTION INTRAVASCULAR ONCE
Status: COMPLETED | OUTPATIENT
Start: 2023-10-13 | End: 2023-10-13

## 2023-10-13 RX ORDER — PYRIDOSTIGMINE BROMIDE 60 MG/1
60 TABLET ORAL
Status: DISCONTINUED | OUTPATIENT
Start: 2023-10-13 | End: 2023-10-15 | Stop reason: HOSPADM

## 2023-10-13 RX ORDER — POLYETHYLENE GLYCOL 3350 17 G/17G
17 POWDER, FOR SOLUTION ORAL DAILY PRN
Status: DISCONTINUED | OUTPATIENT
Start: 2023-10-13 | End: 2023-10-15 | Stop reason: HOSPADM

## 2023-10-13 RX ORDER — ONDANSETRON 4 MG/1
4 TABLET, ORALLY DISINTEGRATING ORAL EVERY 8 HOURS PRN
Status: DISCONTINUED | OUTPATIENT
Start: 2023-10-13 | End: 2023-10-15 | Stop reason: HOSPADM

## 2023-10-13 RX ORDER — SODIUM CHLORIDE 0.9 % (FLUSH) 0.9 %
5-40 SYRINGE (ML) INJECTION EVERY 12 HOURS SCHEDULED
Status: DISCONTINUED | OUTPATIENT
Start: 2023-10-13 | End: 2023-10-15 | Stop reason: HOSPADM

## 2023-10-13 RX ORDER — SERTRALINE HYDROCHLORIDE 20 MG/ML
25 SOLUTION ORAL DAILY
Status: ON HOLD | COMMUNITY
End: 2023-10-15 | Stop reason: HOSPADM

## 2023-10-13 RX ORDER — AMLODIPINE BESYLATE 5 MG/1
5 TABLET ORAL 2 TIMES DAILY
Status: DISCONTINUED | OUTPATIENT
Start: 2023-10-14 | End: 2023-10-15 | Stop reason: HOSPADM

## 2023-10-13 RX ORDER — PYRIDOSTIGMINE BROMIDE 60 MG/1
60 TABLET ORAL
Status: CANCELLED | OUTPATIENT
Start: 2023-10-13

## 2023-10-13 RX ORDER — ACETAMINOPHEN 325 MG/1
650 TABLET ORAL EVERY 6 HOURS PRN
Status: DISCONTINUED | OUTPATIENT
Start: 2023-10-13 | End: 2023-10-15 | Stop reason: HOSPADM

## 2023-10-13 RX ORDER — LEVETIRACETAM 10 MG/ML
INJECTION INTRAVASCULAR
Status: COMPLETED
Start: 2023-10-13 | End: 2023-10-13

## 2023-10-13 RX ADMIN — MAGNESIUM SULFATE HEPTAHYDRATE 2000 MG: 40 INJECTION, SOLUTION INTRAVENOUS at 16:53

## 2023-10-13 RX ADMIN — LEVETIRACETAM 2000 MG: 10 INJECTION INTRAVASCULAR at 16:54

## 2023-10-13 RX ADMIN — LORAZEPAM 2 MG: 2 INJECTION INTRAMUSCULAR; INTRAVENOUS at 16:55

## 2023-10-13 RX ADMIN — LORAZEPAM 2 MG: 2 INJECTION INTRAMUSCULAR at 16:55

## 2023-10-13 RX ADMIN — SODIUM CHLORIDE 1000 ML: 9 INJECTION, SOLUTION INTRAVENOUS at 19:47

## 2023-10-13 RX ADMIN — SODIUM CHLORIDE 1000 ML: 9 INJECTION, SOLUTION INTRAVENOUS at 16:53

## 2023-10-13 RX ADMIN — IOPAMIDOL 75 ML: 755 INJECTION, SOLUTION INTRAVENOUS at 17:29

## 2023-10-13 RX ADMIN — PYRIDOSTIGMINE BROMIDE 60 MG: 60 TABLET ORAL at 18:47

## 2023-10-13 RX ADMIN — SODIUM CHLORIDE, PRESERVATIVE FREE 10 ML: 5 INJECTION INTRAVENOUS at 23:29

## 2023-10-13 RX ADMIN — METOPROLOL SUCCINATE 25 MG: 25 TABLET, FILM COATED, EXTENDED RELEASE ORAL at 23:24

## 2023-10-13 RX ADMIN — SODIUM CHLORIDE, POTASSIUM CHLORIDE, SODIUM LACTATE AND CALCIUM CHLORIDE: 600; 310; 30; 20 INJECTION, SOLUTION INTRAVENOUS at 23:22

## 2023-10-13 RX ADMIN — PYRIDOSTIGMINE BROMIDE 60 MG: 60 TABLET ORAL at 22:52

## 2023-10-13 RX ADMIN — LEVETIRACETAM 2000 MG: 10 INJECTION, SOLUTION INTRAVENOUS at 16:54

## 2023-10-13 RX ADMIN — POTASSIUM CHLORIDE 10 MEQ: 7.46 INJECTION, SOLUTION INTRAVENOUS at 18:41

## 2023-10-13 NOTE — ED PROVIDER NOTES
[facility] ED  Emergency Department Encounter  Emergency Medicine Resident     Pt Name:Blake Pedraza  MRN: [id number]  [address] [facility] 1950  Date of evaluation: 10/13/23  PCP:  Subha Casanova MD  Note Started: 4:24 PM EDT      CHIEF COMPLAINT       Chief Complaint   Patient presents with    Shortness of Breath       HISTORY OF PRESENT ILLNESS  (Location/Symptom, Timing/Onset, Context/Setting, Quality, Duration, Modifying Factors, Severity.)      Leatha West is a 68 y.o. male who presents with supraventricular tachycardia and shortness of breath. Patient called EMS from home due to not feeling well. When they arrived he was leaning on his bed reporting shortness of breath. Monitor showed heart rate in the 180s. Patient was given adenosine and heart rate improved. Glucose is normal onsite. Upon patient's arrival, he began to have tonic-clonic convulsions for approximately 1 minute. Patient was given 2 mg Ativan. Seizure stopped and patient was initially postictal for approximately 30 minutes. Patient was given 2 g of IV Keppra. PAST MEDICAL / SURGICAL / SOCIAL / FAMILY HISTORY      has a past medical history of Arthritis, Chronic kidney disease, Dysmetabolic syndrome, Hypertension, Iron deficiency anemia, unspecified, Movement disorder, Myasthenia gravis ([address]), Proteinuria, and Seizures ([address]). has a past surgical history that includes Quadraceps tendon repair; knee surgery; Cholecystectomy (12/10/2022); and Cholecystectomy, laparoscopic (N/A, 12/10/2022). Social History     Socioeconomic History    Marital status:      Spouse name: Not on file    Number of children: Not on file    Years of education: Not on file    Highest education level: Not on file   Occupational History    Not on file   Tobacco Use    Smoking status: Former     Packs/day: 1     Types: Cigarettes    Smokeless tobacco: Never   Substance and Sexual Activity    Alcohol use: No    Drug use:  No

## 2023-10-14 ENCOUNTER — APPOINTMENT (OUTPATIENT)
Age: 73
DRG: 100 | End: 2023-10-14
Attending: STUDENT IN AN ORGANIZED HEALTH CARE EDUCATION/TRAINING PROGRAM
Payer: COMMERCIAL

## 2023-10-14 ENCOUNTER — APPOINTMENT (OUTPATIENT)
Dept: MRI IMAGING | Age: 73
DRG: 100 | End: 2023-10-14
Payer: COMMERCIAL

## 2023-10-14 LAB
ALBUMIN SERPL-MCNC: 3.2 G/DL (ref 3.5–5.2)
ALBUMIN/GLOB SERPL: 1.4 {RATIO} (ref 1–2.5)
ALP SERPL-CCNC: 89 U/L (ref 40–129)
ALT SERPL-CCNC: 11 U/L (ref 5–41)
ANION GAP SERPL CALCULATED.3IONS-SCNC: 11 MMOL/L (ref 9–17)
AST SERPL-CCNC: 20 U/L
BASOPHILS # BLD: <0.03 K/UL (ref 0–0.2)
BASOPHILS NFR BLD: 0 % (ref 0–2)
BILIRUB SERPL-MCNC: 0.7 MG/DL (ref 0.3–1.2)
BUN SERPL-MCNC: 18 MG/DL (ref 8–23)
CALCIUM SERPL-MCNC: 8.3 MG/DL (ref 8.6–10.4)
CHLORIDE SERPL-SCNC: 110 MMOL/L (ref 98–107)
CO2 SERPL-SCNC: 18 MMOL/L (ref 20–31)
CREAT SERPL-MCNC: 1.4 MG/DL (ref 0.7–1.2)
ECHO AO ROOT DIAM: 3.1 CM
ECHO AO ROOT INDEX: 1.67 CM/M2
ECHO AR MAX VEL PISA: 4.1 M/S
ECHO AV AREA PEAK VELOCITY: 1 CM2
ECHO AV AREA VTI: 1 CM2
ECHO AV AREA/BSA PEAK VELOCITY: 0.5 CM2/M2
ECHO AV AREA/BSA VTI: 0.5 CM2/M2
ECHO AV MEAN GRADIENT: 17 MMHG
ECHO AV MEAN VELOCITY: 1.9 M/S
ECHO AV PEAK GRADIENT: 31 MMHG
ECHO AV PEAK VELOCITY: 2.8 M/S
ECHO AV REGURGITANT PHT: 408 MS
ECHO AV VELOCITY RATIO: 0.32
ECHO AV VTI: 62.9 CM
ECHO BSA: 1.85 M2
ECHO EST RA PRESSURE: 8 MMHG
ECHO LA AREA 2C: 27.4 CM2
ECHO LA AREA 4C: 21.1 CM2
ECHO LA DIAMETER INDEX: 2.04 CM/M2
ECHO LA DIAMETER: 3.8 CM
ECHO LA MAJOR AXIS: 6.9 CM
ECHO LA MINOR AXIS: 6.9 CM
ECHO LA TO AORTIC ROOT RATIO: 1.23
ECHO LA VOL 2C: 88 ML (ref 18–58)
ECHO LA VOL 4C: 52 ML (ref 18–58)
ECHO LA VOL BP: 67 ML (ref 18–58)
ECHO LA VOL/BSA BIPLANE: 36 ML/M2 (ref 16–34)
ECHO LA VOLUME INDEX A2C: 47 ML/M2 (ref 16–34)
ECHO LA VOLUME INDEX A4C: 28 ML/M2 (ref 16–34)
ECHO LV E' LATERAL VELOCITY: 13 CM/S
ECHO LV E' SEPTAL VELOCITY: 9 CM/S
ECHO LV EDV A2C: 58 ML
ECHO LV EDV A4C: 73 ML
ECHO LV EDV INDEX A4C: 39 ML/M2
ECHO LV EDV NDEX A2C: 31 ML/M2
ECHO LV EJECTION FRACTION A2C: 58 %
ECHO LV EJECTION FRACTION A4C: 72 %
ECHO LV EJECTION FRACTION BIPLANE: 66 % (ref 55–100)
ECHO LV ESV A2C: 24 ML
ECHO LV ESV A4C: 20 ML
ECHO LV ESV INDEX A2C: 13 ML/M2
ECHO LV ESV INDEX A4C: 11 ML/M2
ECHO LV FRACTIONAL SHORTENING: 20 % (ref 28–44)
ECHO LV INTERNAL DIMENSION DIASTOLE INDEX: 2.15 CM/M2
ECHO LV INTERNAL DIMENSION DIASTOLIC: 4 CM (ref 4.2–5.9)
ECHO LV INTERNAL DIMENSION SYSTOLIC INDEX: 1.72 CM/M2
ECHO LV INTERNAL DIMENSION SYSTOLIC: 3.2 CM
ECHO LV IVSD: 1.1 CM (ref 0.6–1)
ECHO LV MASS 2D: 145.6 G (ref 88–224)
ECHO LV MASS INDEX 2D: 78.3 G/M2 (ref 49–115)
ECHO LV POSTERIOR WALL DIASTOLIC: 1.1 CM (ref 0.6–1)
ECHO LV RELATIVE WALL THICKNESS RATIO: 0.55
ECHO LVOT AREA: 3.1 CM2
ECHO LVOT AV VTI INDEX: 0.3
ECHO LVOT DIAM: 2 CM
ECHO LVOT MEAN GRADIENT: 2 MMHG
ECHO LVOT PEAK GRADIENT: 3 MMHG
ECHO LVOT PEAK VELOCITY: 0.9 M/S
ECHO LVOT STROKE VOLUME INDEX: 32.2 ML/M2
ECHO LVOT SV: 60 ML
ECHO LVOT VTI: 19.1 CM
ECHO MV A VELOCITY: 0.64 M/S
ECHO MV AREA VTI: 2.3 CM2
ECHO MV E DECELERATION TIME (DT): 201 MS
ECHO MV E VELOCITY: 0.83 M/S
ECHO MV E/A RATIO: 1.3
ECHO MV E/E' LATERAL: 6.38
ECHO MV E/E' RATIO (AVERAGED): 7.8
ECHO MV E/E' SEPTAL: 9.22
ECHO MV LVOT VTI INDEX: 1.36
ECHO MV MAX VELOCITY: 0.9 M/S
ECHO MV MEAN GRADIENT: 1 MMHG
ECHO MV MEAN VELOCITY: 0.6 M/S
ECHO MV PEAK GRADIENT: 3 MMHG
ECHO MV VTI: 25.9 CM
ECHO PV MAX VELOCITY: 1.5 M/S
ECHO PV PEAK GRADIENT: 9 MMHG
ECHO RA AREA 4C: 17.5 CM2
ECHO RIGHT VENTRICULAR SYSTOLIC PRESSURE (RVSP): 32 MMHG
ECHO RV BASAL DIMENSION: 4.2 CM
ECHO RV FREE WALL PEAK S': 13 CM/S
ECHO RV TAPSE: 2.3 CM (ref 1.7–?)
ECHO TV REGURGITANT MAX VELOCITY: 2.44 M/S
ECHO TV REGURGITANT PEAK GRADIENT: 24 MMHG
EOSINOPHIL # BLD: 0.11 K/UL (ref 0–0.44)
EOSINOPHILS RELATIVE PERCENT: 1 % (ref 1–4)
ERYTHROCYTE [DISTWIDTH] IN BLOOD BY AUTOMATED COUNT: 13.9 % (ref 11.8–14.4)
GFR SERPL CREATININE-BSD FRML MDRD: 53 ML/MIN/1.73M2
GLUCOSE SERPL-MCNC: 89 MG/DL (ref 70–99)
HCT VFR BLD AUTO: 37.4 % (ref 40.7–50.3)
HGB BLD-MCNC: 11.4 G/DL (ref 13–17)
IMM GRANULOCYTES # BLD AUTO: 0.04 K/UL (ref 0–0.3)
IMM GRANULOCYTES NFR BLD: 0 %
LACTIC ACID, WHOLE BLOOD: 1 MMOL/L (ref 0.7–2.1)
LACTIC ACID, WHOLE BLOOD: 1.1 MMOL/L (ref 0.7–2.1)
LYMPHOCYTES NFR BLD: 1.25 K/UL (ref 1.1–3.7)
LYMPHOCYTES RELATIVE PERCENT: 13 % (ref 24–43)
MCH RBC QN AUTO: 27.5 PG (ref 25.2–33.5)
MCHC RBC AUTO-ENTMCNC: 30.5 G/DL (ref 28.4–34.8)
MCV RBC AUTO: 90.3 FL (ref 82.6–102.9)
MICROORGANISM SPEC CULT: NORMAL
MONOCYTES NFR BLD: 0.82 K/UL (ref 0.1–1.2)
MONOCYTES NFR BLD: 8 % (ref 3–12)
NEUTROPHILS NFR BLD: 78 % (ref 36–65)
NEUTS SEG NFR BLD: 7.75 K/UL (ref 1.5–8.1)
NRBC BLD-RTO: 0 PER 100 WBC
PLATELET # BLD AUTO: 184 K/UL (ref 138–453)
PMV BLD AUTO: 11.3 FL (ref 8.1–13.5)
POTASSIUM SERPL-SCNC: 4.3 MMOL/L (ref 3.7–5.3)
PROT SERPL-MCNC: 5.5 G/DL (ref 6.4–8.3)
RBC # BLD AUTO: 4.14 M/UL (ref 4.21–5.77)
SODIUM SERPL-SCNC: 139 MMOL/L (ref 135–144)
SPECIMEN DESCRIPTION: NORMAL
WBC OTHER # BLD: 10 K/UL (ref 3.5–11.3)

## 2023-10-14 PROCEDURE — A9579 GAD-BASE MR CONTRAST NOS,1ML: HCPCS | Performed by: PSYCHIATRY & NEUROLOGY

## 2023-10-14 PROCEDURE — 95816 EEG AWAKE AND DROWSY: CPT

## 2023-10-14 PROCEDURE — 99223 1ST HOSP IP/OBS HIGH 75: CPT | Performed by: INTERNAL MEDICINE

## 2023-10-14 PROCEDURE — 36415 COLL VENOUS BLD VENIPUNCTURE: CPT

## 2023-10-14 PROCEDURE — 99222 1ST HOSP IP/OBS MODERATE 55: CPT | Performed by: PSYCHIATRY & NEUROLOGY

## 2023-10-14 PROCEDURE — 70553 MRI BRAIN STEM W/O & W/DYE: CPT

## 2023-10-14 PROCEDURE — 99232 SBSQ HOSP IP/OBS MODERATE 35: CPT | Performed by: STUDENT IN AN ORGANIZED HEALTH CARE EDUCATION/TRAINING PROGRAM

## 2023-10-14 PROCEDURE — 2580000003 HC RX 258: Performed by: STUDENT IN AN ORGANIZED HEALTH CARE EDUCATION/TRAINING PROGRAM

## 2023-10-14 PROCEDURE — 6370000000 HC RX 637 (ALT 250 FOR IP)

## 2023-10-14 PROCEDURE — 6370000000 HC RX 637 (ALT 250 FOR IP): Performed by: STUDENT IN AN ORGANIZED HEALTH CARE EDUCATION/TRAINING PROGRAM

## 2023-10-14 PROCEDURE — 6360000004 HC RX CONTRAST MEDICATION: Performed by: PSYCHIATRY & NEUROLOGY

## 2023-10-14 PROCEDURE — 85025 COMPLETE CBC W/AUTO DIFF WBC: CPT

## 2023-10-14 PROCEDURE — 2060000000 HC ICU INTERMEDIATE R&B

## 2023-10-14 PROCEDURE — 83605 ASSAY OF LACTIC ACID: CPT

## 2023-10-14 PROCEDURE — 93306 TTE W/DOPPLER COMPLETE: CPT | Performed by: INTERNAL MEDICINE

## 2023-10-14 PROCEDURE — 93306 TTE W/DOPPLER COMPLETE: CPT

## 2023-10-14 PROCEDURE — 80053 COMPREHEN METABOLIC PANEL: CPT

## 2023-10-14 PROCEDURE — 2580000003 HC RX 258: Performed by: PSYCHIATRY & NEUROLOGY

## 2023-10-14 RX ORDER — SODIUM CHLORIDE 0.9 % (FLUSH) 0.9 %
10 SYRINGE (ML) INJECTION PRN
Status: DISCONTINUED | OUTPATIENT
Start: 2023-10-14 | End: 2023-10-15 | Stop reason: HOSPADM

## 2023-10-14 RX ORDER — LEVETIRACETAM 500 MG/1
750 TABLET ORAL 2 TIMES DAILY
Status: DISCONTINUED | OUTPATIENT
Start: 2023-10-14 | End: 2023-10-15 | Stop reason: HOSPADM

## 2023-10-14 RX ORDER — ONDANSETRON 4 MG/1
4 TABLET, FILM COATED ORAL EVERY 8 HOURS PRN
Status: DISCONTINUED | OUTPATIENT
Start: 2023-10-14 | End: 2023-10-15 | Stop reason: HOSPADM

## 2023-10-14 RX ADMIN — PYRIDOSTIGMINE BROMIDE 60 MG: 60 TABLET ORAL at 21:35

## 2023-10-14 RX ADMIN — SERTRALINE HYDROCHLORIDE 50 MG: 50 TABLET ORAL at 18:14

## 2023-10-14 RX ADMIN — SODIUM CHLORIDE, PRESERVATIVE FREE 10 ML: 5 INJECTION INTRAVENOUS at 15:47

## 2023-10-14 RX ADMIN — PYRIDOSTIGMINE BROMIDE 60 MG: 60 TABLET ORAL at 11:56

## 2023-10-14 RX ADMIN — SODIUM CHLORIDE, POTASSIUM CHLORIDE, SODIUM LACTATE AND CALCIUM CHLORIDE: 600; 310; 30; 20 INJECTION, SOLUTION INTRAVENOUS at 11:59

## 2023-10-14 RX ADMIN — PYRIDOSTIGMINE BROMIDE 60 MG: 60 TABLET ORAL at 08:43

## 2023-10-14 RX ADMIN — METOPROLOL SUCCINATE 25 MG: 25 TABLET, FILM COATED, EXTENDED RELEASE ORAL at 08:43

## 2023-10-14 RX ADMIN — METOPROLOL SUCCINATE 25 MG: 25 TABLET, FILM COATED, EXTENDED RELEASE ORAL at 21:35

## 2023-10-14 RX ADMIN — PYRIDOSTIGMINE BROMIDE 60 MG: 60 TABLET ORAL at 14:02

## 2023-10-14 RX ADMIN — SODIUM CHLORIDE, PRESERVATIVE FREE 10 ML: 5 INJECTION INTRAVENOUS at 21:35

## 2023-10-14 RX ADMIN — GADOTERIDOL 12 ML: 279.3 INJECTION, SOLUTION INTRAVENOUS at 15:46

## 2023-10-14 RX ADMIN — AMLODIPINE BESYLATE 5 MG: 5 TABLET ORAL at 08:43

## 2023-10-14 RX ADMIN — PYRIDOSTIGMINE BROMIDE 60 MG: 60 TABLET ORAL at 18:14

## 2023-10-14 RX ADMIN — SODIUM CHLORIDE, PRESERVATIVE FREE 10 ML: 5 INJECTION INTRAVENOUS at 08:43

## 2023-10-14 RX ADMIN — LEVETIRACETAM 750 MG: 500 TABLET, FILM COATED ORAL at 08:43

## 2023-10-14 RX ADMIN — AMLODIPINE BESYLATE 5 MG: 5 TABLET ORAL at 18:14

## 2023-10-14 RX ADMIN — LEVETIRACETAM 750 MG: 500 TABLET, FILM COATED ORAL at 21:35

## 2023-10-14 ASSESSMENT — ENCOUNTER SYMPTOMS
SORE THROAT: 0
COUGH: 0
ABDOMINAL DISTENTION: 0
SHORTNESS OF BREATH: 0
BACK PAIN: 0
COLOR CHANGE: 0
CHEST TIGHTNESS: 0
DIARRHEA: 0
ABDOMINAL PAIN: 0
NAUSEA: 0

## 2023-10-14 NOTE — PLAN OF CARE
Problem: Safety - Adult  Goal: Free from fall injury  Outcome: Progressing  Flowsheets (Taken 10/13/2023 2300)  Free From Fall Injury: Instruct family/caregiver on patient safety     Problem: ABCDS Injury Assessment  Goal: Absence of physical injury  Outcome: Progressing

## 2023-10-14 NOTE — H&P
Temp 37.0     FIO2 INFORMATION NOT PROVIDED        Imaging/Diagnostics:  CT HEAD WO CONTRAST (Select for new onset seizures or head trauma)    Result Date: 10/13/2023  No evidence of pulmonary embolism or acute pulmonary abnormality. Moderate emphysema. Mild bilateral lower lobe atelectatic changes. No evidence of aortic aneurysm or dissection. No acute intracranial abnormality. Age-appropriate atrophy and small-vessel disease ischemic changes. Incidental, tiny hyperdense focus in the 4th ventricle measures 2.3 mm. Choroid plexus, small papilloma or less likely tiny hemorrhage is possible. CT CHEST PULMONARY EMBOLISM W CONTRAST    Result Date: 10/13/2023  No evidence of pulmonary embolism or acute pulmonary abnormality. Moderate emphysema. Mild bilateral lower lobe atelectatic changes. No evidence of aortic aneurysm or dissection. No acute intracranial abnormality. Age-appropriate atrophy and small-vessel disease ischemic changes. Incidental, tiny hyperdense focus in the 4th ventricle measures 2.3 mm. Choroid plexus, small papilloma or less likely tiny hemorrhage is possible. XR CHEST PORTABLE    Result Date: 10/13/2023  Chest x-ray is within normal limits.        Assessment :      Hospital Problems             Last Modified POA    * (Principal) Generalized tonic-clonic seizure ([address]) 10/13/2023 Yes    Chronic kidney disease, stage III (moderate) ([address]) 10/13/2023 Yes    Hypertension 10/13/2023 Yes    Myasthenia gravis ([address]) 10/13/2023 Yes    Seizure disorder ([address]) 10/13/2023 Yes    Acidosis 10/13/2023 Yes    Acute metabolic encephalopathy 08/72/0279 Yes    SVT (supraventricular tachycardia) 10/13/2023 Yes    Hypokalemia 10/13/2023 Yes    Lactic acidosis 10/13/2023 Yes    High anion gap metabolic acidosis 58/83/2654 Yes    Acute kidney injury superimposed on CKD ([address]) 10/13/2023 Yes    Leukocytosis 10/13/2023 Yes    Elevated troponin 10/13/2023 Yes       Plan:     Patient status inpatient in the

## 2023-10-14 NOTE — ED NOTES
2 grams of Keppra started by Nikki Bird RN per Dr. Carson Gerardo orders.       Namita Fang RN  10/13/23 3395
2 of ativan given by Silvia Estrada RN per Dr. Kapil Sanchez orders.      Nick Martinez RN  10/13/23 0805
Pt arrives to ED 26 via EMS. EMS states pt was at home and was found leaning over on his bed. EMS states pt was SOB upon arrival.   EMS states pt was placed on the cardiac monitor and was showing 180's HR. EMS states they gave pt adenosine and pt HR came down to 120s. Upon arrival pt is A&O x 4. Pt answering questions appropriately. Pt began having seizure like activity while answering questions, and became unable to respond to questions. Dr. Fred Coates at bedside stating pt is having a seizure. Pt placed on nonrebreather.         Shara Lambert RN  10/13/23 7826
Pt respirations are even and unlabored, pt is alert and oriented X 2, speaking in complete sentences, bed is in the lowest position, call light is within reach, NAD noted. Will continue to follow plan of care.         Namita Fang RN  10/13/23 9574
Pt respirations are even and unlabored, pt is alert and oriented X 4, speaking in complete sentences, bed is in the lowest position, call light is within reach, NAD noted. Will continue to follow plan of care.         Krysta Umana, RN  10/13/23 2504
Pt respirations are even and unlabored, pt is alert and oriented X 4, speaking in complete sentences, bed is in the lowest position, call light is within reach, NAD noted. Will continue to follow plan of care.         Maura Lui RN  10/13/23 2206       Maura Lui RN  10/13/23 2207
Pt respirations are even and unlabored, pt is alert and oriented X 4, speaking in complete sentences, bed is in the lowest position, call light is within reach, NAD noted. Will continue to follow plan of care.         Pierce Olvera RN  10/13/23 6117
Pt respirations are even and unlabored, pt is alert and oriented X 4, speaking in complete sentences, bed is in the lowest position, call light is within reach, NAD noted. Will continue to follow plan of care.         Rodolfo Woodruff RN  10/13/23 1312
(POC)   BETA-HYDROXYBUTYRATE   CK   UREA N (POC)       Electronically signed by Krysta Umana RN on 10/13/2023 at 9:45 PM      Krysta Umana RN  10/13/23 4245

## 2023-10-14 NOTE — CONSULTS
[facility]    Consult               Today's Date: 10/14/2023  Patient Name: Aura Gamble  Date of admission: 10/13/2023  4:13 PM  Patient's age: 68 y.o., 1950  Admission Dx: Paroxysmal supraventricular tachycardia [I47.10]  Generalized tonic-clonic seizure ([address]) [G40.409]  Myasthenia gravis ([address]) [G70.00]  Elevated troponin [R79.89]  CATHERINE (acute kidney injury) ([address]) [N17.9]  Breakthrough seizure ([address]) [facility]    Requesting Physician: No admitting provider for patient encounter. Cardiac Evaluation Reason: SVT    History Obtained From: patient and chart review     History of Present Illness: This patient 68y.o. years old with past medical history given below. Patient is presenting to the hospital with history of possible and EMS was called. EMS performed a twelve-lead EKG which showed tachycardia with narrow complex 180 bpm, patient was given adenosine 6 mg x 1 and converted to sinus rhythm of 110. On arrival to the ED patient was shivering. And later he developed generalized tonic-clonic seizure with eye deviation to the right and facial deviation to the right as well, IV Ativan and Keppra load was given. Cardiology consulted  for SVT. Past Medical History:   has a past medical history of Arthritis, Chronic kidney disease, Dysmetabolic syndrome, Hypertension, Iron deficiency anemia, unspecified, Movement disorder, Myasthenia gravis ([address]), Proteinuria, and Seizures ([address]). Past Surgical History:   has a past surgical history that includes Quadraceps tendon repair; knee surgery; Cholecystectomy (12/10/2022); and Cholecystectomy, laparoscopic (N/A, 12/10/2022). Home Medications:    Prior to Admission medications    Medication Sig Start Date End Date Taking?  Authorizing Provider   sertraline (ZOLOFT) 20 MG/ML concentrated solution Take 1.25 mLs by mouth daily   Yes Provider, MD Dante   vitamin B-12 (CYANOCOBALAMIN) 100 MCG tablet Take 0.5 tablets by mouth daily    Provider
[facility]    Consult / H&P               Today's Date: 10/14/2023  Patient Name: Tish Frias  Date of admission: 10/13/2023  4:13 PM  Patient's age: 68 y.o., 1950  Admission Dx: Paroxysmal supraventricular tachycardia [I47.10]  Generalized tonic-clonic seizure ([address]) [G40.409]  Myasthenia gravis ([address]) [G70.00]  Elevated troponin [R79.89]  CATHERINE (acute kidney injury) ([address]) [N17.9]  Breakthrough seizure ([address]) Juan Oropeza    Reason for Consult:  Cardiac evaluation    Requesting Physician: No admitting provider for patient encounter. CHIEF COMPLAINT:  ***    History Obtained From:  {HISTORY SOURCE:[id number]::\"patient\"}    HISTORY OF PRESENT ILLNESS:      The patient is a 68 y.o. {Race/ethnicity:55986} male who is admitted to the hospital for {Cardiovascular CC:78231}    Past Medical History:   has a past medical history of Arthritis, Chronic kidney disease, Dysmetabolic syndrome, Hypertension, Iron deficiency anemia, unspecified, Movement disorder, Myasthenia gravis ([address]), Proteinuria, and Seizures ([address]). Past Surgical History:   has a past surgical history that includes Quadraceps tendon repair; knee surgery; Cholecystectomy (12/10/2022); and Cholecystectomy, laparoscopic (N/A, 12/10/2022). Home Medications:    Prior to Admission medications    Medication Sig Start Date End Date Taking?  Authorizing Provider   sertraline (ZOLOFT) 20 MG/ML concentrated solution Take 1.25 mLs by mouth daily   Yes Dante Gregg MD   vitamin B-12 (CYANOCOBALAMIN) 100 MCG tablet Take 0.5 tablets by mouth daily    Dante Gregg MD   sertraline (ZOLOFT) 50 MG tablet Take 1 tablet by mouth daily    Dante Gregg MD   clopidogrel (PLAVIX) 75 MG tablet Take 1 tablet by mouth daily    Dante Gregg MD   pyridostigmine (MESTINON) 60 MG tablet Take 1 tablet by mouth 5 times daily 5/18/23   Bo Acosta DO   levETIRAcetam (KEPPRA) 500 MG tablet Take 1 tablet by mouth 2 times
distal muscle groups   RLE: Significant for good strength of grade 5/5 in proximal and distal muscle groups   LLE: Significant for good strength of grade 5/5 in proximal and distal muscle groups      Normal bulk, normal tone and no involuntary movements, no tremor   SENSORY FUNCTION:  Normal touch, normal pinprick, normal vibration, normal proprioception   CEREBELLAR FUNCTION:  Intact fine motor control over upper limbs and lower limbs   REFLEX FUNCTION:  Symmetric in upper and lower extremities, no Babinski sign   STATION and GAIT Deferred       Investigations:      Laboratory Testing:  Recent Results (from the past 24 hour(s))   Venous Blood Gas, POC    Collection Time: 10/13/23  4:23 PM   Result Value Ref Range    pH, Emiliano 7.025 (LL) 7.320 - 7.430    pCO2, Emiliano 40.9 (L) 41.0 - 51.0 mm Hg    pO2, Emiliano 104.5 (H) 30.0 - 50.0 mm Hg    HCO3, Venous 10.7 (L) 22.0 - 29.0 mmol/L    Negative Base Excess, Emiliano 19.8 (H) 0.0 - 2.0 mmol/L    O2 Sat, Emiliano 94.3 (H) 60.0 - 85.0 %   ELECTROLYTES PLUS    Collection Time: 10/13/23  4:23 PM   Result Value Ref Range    POC Sodium 148 (H) 138 - 146 mmol/L    POC Potassium 3.3 (L) 3.5 - 4.5 mmol/L    POC Chloride 115 (H) 98 - 107 mmol/L    POC TCO2 11 (L) 22 - 30 mmol/L    POC Anion Gap 23 (H) 7 - 16 mmol/L   Hemoglobin and hematocrit, blood    Collection Time: 10/13/23  4:23 PM   Result Value Ref Range    POC Hemoglobin (calc) 15.1 13.5 - 17.5 g/dL    POC Hematocrit 44 41 - 53 %   Creatinine W/GFR Point of Care    Collection Time: 10/13/23  4:23 PM   Result Value Ref Range    POC Creatinine 1.8 (H) 0.51 - 1.19 mg/dL    eGFR, POC 39 mL/min/1.73m2   CALCIUM, IONIC (POC)    Collection Time: 10/13/23  4:23 PM   Result Value Ref Range    POC Ionized Calcium 1.31 1.15 - 1.33 mmol/L   POCT urea (BUN)    Collection Time: 10/13/23  4:23 PM   Result Value Ref Range    POC BUN 20 8 - 26 mg/dL   Lactic Acid, POC    Collection Time: 10/13/23  4:23 PM   Result Value Ref Range    POC Lactic Acid 17.4 (H)

## 2023-10-15 VITALS
HEART RATE: 82 BPM | TEMPERATURE: 97.8 F | SYSTOLIC BLOOD PRESSURE: 125 MMHG | DIASTOLIC BLOOD PRESSURE: 76 MMHG | WEIGHT: 152 LBS | OXYGEN SATURATION: 98 % | BODY MASS INDEX: 21.76 KG/M2 | RESPIRATION RATE: 19 BRPM | HEIGHT: 70 IN

## 2023-10-15 PROCEDURE — 2580000003 HC RX 258: Performed by: STUDENT IN AN ORGANIZED HEALTH CARE EDUCATION/TRAINING PROGRAM

## 2023-10-15 PROCEDURE — 84155 ASSAY OF PROTEIN SERUM: CPT

## 2023-10-15 PROCEDURE — 95816 EEG AWAKE AND DROWSY: CPT | Performed by: PSYCHIATRY & NEUROLOGY

## 2023-10-15 PROCEDURE — 99233 SBSQ HOSP IP/OBS HIGH 50: CPT | Performed by: SURGERY

## 2023-10-15 PROCEDURE — 99232 SBSQ HOSP IP/OBS MODERATE 35: CPT | Performed by: PSYCHIATRY & NEUROLOGY

## 2023-10-15 PROCEDURE — 86334 IMMUNOFIX E-PHORESIS SERUM: CPT

## 2023-10-15 PROCEDURE — 82103 ALPHA-1-ANTITRYPSIN TOTAL: CPT

## 2023-10-15 PROCEDURE — 6370000000 HC RX 637 (ALT 250 FOR IP)

## 2023-10-15 PROCEDURE — 99239 HOSP IP/OBS DSCHRG MGMT >30: CPT | Performed by: INTERNAL MEDICINE

## 2023-10-15 PROCEDURE — 84166 PROTEIN E-PHORESIS/URINE/CSF: CPT

## 2023-10-15 PROCEDURE — 84156 ASSAY OF PROTEIN URINE: CPT

## 2023-10-15 PROCEDURE — 84165 PROTEIN E-PHORESIS SERUM: CPT

## 2023-10-15 PROCEDURE — 36415 COLL VENOUS BLD VENIPUNCTURE: CPT

## 2023-10-15 PROCEDURE — 6370000000 HC RX 637 (ALT 250 FOR IP): Performed by: STUDENT IN AN ORGANIZED HEALTH CARE EDUCATION/TRAINING PROGRAM

## 2023-10-15 RX ORDER — METOPROLOL SUCCINATE 25 MG/1
25 TABLET, EXTENDED RELEASE ORAL 2 TIMES DAILY
Qty: 60 TABLET | Refills: 1 | Status: SHIPPED | OUTPATIENT
Start: 2023-10-15

## 2023-10-15 RX ORDER — AMLODIPINE BESYLATE 5 MG/1
5 TABLET ORAL 2 TIMES DAILY
Qty: 60 TABLET | COMMUNITY
Start: 2023-10-15

## 2023-10-15 RX ORDER — LEVETIRACETAM 750 MG/1
750 TABLET ORAL 2 TIMES DAILY
Qty: 60 TABLET | Refills: 1 | Status: SHIPPED | OUTPATIENT
Start: 2023-10-15

## 2023-10-15 RX ADMIN — AMLODIPINE BESYLATE 5 MG: 5 TABLET ORAL at 08:01

## 2023-10-15 RX ADMIN — METOPROLOL SUCCINATE 25 MG: 25 TABLET, FILM COATED, EXTENDED RELEASE ORAL at 08:01

## 2023-10-15 RX ADMIN — PYRIDOSTIGMINE BROMIDE 60 MG: 60 TABLET ORAL at 14:10

## 2023-10-15 RX ADMIN — LEVETIRACETAM 750 MG: 500 TABLET, FILM COATED ORAL at 08:01

## 2023-10-15 RX ADMIN — SODIUM CHLORIDE, PRESERVATIVE FREE 10 ML: 5 INJECTION INTRAVENOUS at 08:01

## 2023-10-15 RX ADMIN — PYRIDOSTIGMINE BROMIDE 60 MG: 60 TABLET ORAL at 10:35

## 2023-10-15 RX ADMIN — AMLODIPINE BESYLATE 5 MG: 5 TABLET ORAL at 15:48

## 2023-10-15 RX ADMIN — PYRIDOSTIGMINE BROMIDE 60 MG: 60 TABLET ORAL at 08:01

## 2023-10-15 NOTE — PLAN OF CARE
Problem: Discharge Planning  Goal: Discharge to home or other facility with appropriate resources  Outcome: Adequate for Discharge     Problem: Safety - Adult  Goal: Free from fall injury  Outcome: Adequate for Discharge  Flowsheets (Taken 10/15/2023 0040 by Julissa Ramires)  Free From Fall Injury: Instruct family/caregiver on patient safety     Problem: Skin/Tissue Integrity  Goal: Absence of new skin breakdown  Description: 1. Monitor for areas of redness and/or skin breakdown  2. Assess vascular access sites hourly  3. Every 4-6 hours minimum:  Change oxygen saturation probe site  4. Every 4-6 hours:  If on nasal continuous positive airway pressure, respiratory therapy assess nares and determine need for appliance change or resting period.   Outcome: Adequate for Discharge     Problem: ABCDS Injury Assessment  Goal: Absence of physical injury  Outcome: Adequate for Discharge  Flowsheets (Taken 10/15/2023 0040 by Julissa Ramires)  Absence of Physical Injury: Implement safety measures based on patient assessment

## 2023-10-15 NOTE — CARE COORDINATION
Transitional Planning  Attempted to see patient, currently sleeping with covers over  head. Will attempt to see patient again as time allows. 1130 am Attempted to see patient, currently getting echo at bedside.
discuss the discharge plan with any other family members/significant others, and if so, who? (P) No  Plans to Return to Present Housing: (P) Yes  Other Identified Issues/Barriers to RETURNING to current housing: na  Potential Assistance needed at discharge: (P) N/A            Potential DME:    Patient expects to discharge to: (P) [address] [facility] for transportation at discharge: (P) Family    Financial    Payor: [employer] / Plan: [employer] / Product Type: *No Product type* /     Does insurance require precert for SNF: Yes    Potential assistance Purchasing Medications: (P) No  Meds-to-Beds request: [facility] [id number] Titi Argueta, [address] [facility] [phone] - F [phone]  [address] [place], [place] [address]  Phone: [phone] Fax: [phone]      Notes:    Factors facilitating achievement of predicted outcomes: Family support, Cooperative, Pleasant, and Has needed Durable Medical Equipment at home    Barriers to discharge: Medical complications    Additional Case Management Notes: Spoke with patient at bedside, role explained. Plan to return home with brother, has transportation, denies further needs. Address, PCP and insurance reviewed. The Plan for Transition of Care is related to the following treatment goals of Paroxysmal supraventricular tachycardia [I47.10]  Generalized tonic-clonic seizure ([address]) [G40.409]  Myasthenia gravis ([address]) [G70.00]  Elevated troponin [R79.89]  CATHERINE (acute kidney injury) ([address]) [N17.9]  Breakthrough seizure ([address]) [I09.238]    IF APPLICABLE: The Patient and/or patient representative Patriciaann Snellen and his family were provided with a choice of provider and agrees with the discharge plan.  Freedom of choice list with basic dialogue that supports the patient's individualized plan of care/goals and shares the quality data associated with the providers was provided to: (P) Patient   Patient Representative Name:       The Patient and/or Patient Representative Agree with

## 2023-10-15 NOTE — DISCHARGE SUMMARY
scan outpatient if abnormal.    Assessment/ Plan  Seizures- Keppra dose increased, Neurology signed off, MRI brain negative for stroke or bleed. He does have some bone lesions in the calvarium, check Spep/ Upep, may need a bone scan outpatient? CT chest negative for any bony lesions, pt informed on this and will f/u with his PCP outpatient  CATHERINE/ CKD 3- baseline 1.4, at baseline  Elevated trop likely from elevated Cr-   Echo reviewed, normal LV function, Cardiology following  SVT- resolved with Adenosine, con't BB, Echo: normal LV function, Mod AR/ MR  Myasthenia gravis- con't pyridostigmine  Lactic acidosis- resolved with IVF  COPD- stable  HTN- BP stable  DVT proph  PT/OT     Okay to discharge home today if okay with other services. Significant therapeutic interventions: Keppra    Significant Diagnostic Studies:      Radiology:  XR CHEST PORTABLE    Result Date: 10/15/2023  Chest x-ray is within normal limits. MRI BRAIN W WO CONTRAST    Result Date: 10/14/2023  No acute intracranial abnormality visualized. Nonspecific marrow replacing lesions within the right parietotemporal calvarium. CT HEAD WO CONTRAST (Select for new onset seizures or head trauma)    Result Date: 10/13/2023  No evidence of pulmonary embolism or acute pulmonary abnormality. Moderate emphysema. Mild bilateral lower lobe atelectatic changes. No evidence of aortic aneurysm or dissection. No acute intracranial abnormality. Age-appropriate atrophy and small-vessel disease ischemic changes. Incidental, tiny hyperdense focus in the 4th ventricle measures 2.3 mm. Choroid plexus, small papilloma or less likely tiny hemorrhage is possible. CT CHEST PULMONARY EMBOLISM W CONTRAST    Result Date: 10/13/2023  No evidence of pulmonary embolism or acute pulmonary abnormality. Moderate emphysema. Mild bilateral lower lobe atelectatic changes. No evidence of aortic aneurysm or dissection. No acute intracranial abnormality.   Age-appropriate

## 2023-10-16 LAB — A1AT SERPL-MCNC: 87 MG/DL (ref 90–200)

## 2023-10-17 LAB
ALBUMIN PERCENT: 64 % (ref 45–65)
ALBUMIN SERPL-MCNC: 3.4 G/DL (ref 3.2–5.2)
ALPHA 2 PERCENT: 15 % (ref 6–13)
ALPHA1 GLOB SERPL ELPH-MCNC: 0.1 G/DL (ref 0.1–0.4)
ALPHA1 GLOB SERPL ELPH-MCNC: 2 % (ref 3–6)
ALPHA2 GLOB SERPL ELPH-MCNC: 0.8 G/DL (ref 0.5–0.9)
B-GLOBULIN SERPL ELPH-MCNC: 0.6 G/DL (ref 0.5–1.1)
B-GLOBULIN SERPL ELPH-MCNC: 11 % (ref 11–19)
GAMMA GLOB SERPL ELPH-MCNC: 0.5 G/DL (ref 0.5–1.5)
GAMMA GLOBULIN %: 9 % (ref 9–20)
INTERPRETATION SERPL IFE-IMP: NORMAL
P E INTERPRETATION, U: NORMAL
PATH REV: NORMAL
PATHOLOGIST: ABNORMAL
PATHOLOGIST: NORMAL
PROT PATTERN SERPL ELPH-IMP: ABNORMAL
PROT SERPL-MCNC: 5.3 G/DL (ref 6.4–8.3)
SPECIMEN TYPE: NORMAL
TOTAL PROT. SUM,%: 101 % (ref 98–102)
TOTAL PROT. SUM: 5.4 G/DL (ref 6.3–8.2)
URINE TOTAL PROTEIN: 17 MG/DL

## 2023-10-18 LAB
EKG ATRIAL RATE: 111 BPM
EKG P-R INTERVAL: 144 MS
EKG Q-T INTERVAL: 360 MS
EKG QRS DURATION: 130 MS
EKG QTC CALCULATION (BAZETT): 489 MS
EKG R AXIS: -15 DEGREES
EKG T AXIS: 31 DEGREES
EKG VENTRICULAR RATE: 111 BPM
MICROORGANISM SPEC CULT: NORMAL
SERVICE CMNT-IMP: NORMAL
SPECIMEN DESCRIPTION: NORMAL

## 2023-10-19 LAB
EKG ATRIAL RATE: 119 BPM
EKG P AXIS: 67 DEGREES
EKG P-R INTERVAL: 154 MS
EKG Q-T INTERVAL: 352 MS
EKG QRS DURATION: 130 MS
EKG QTC CALCULATION (BAZETT): 495 MS
EKG R AXIS: -4 DEGREES
EKG T AXIS: 56 DEGREES
EKG VENTRICULAR RATE: 119 BPM

## 2023-11-12 PROBLEM — R79.89 ELEVATED TROPONIN: Status: RESOLVED | Noted: 2023-10-13 | Resolved: 2023-11-12

## 2024-02-28 ENCOUNTER — APPOINTMENT (OUTPATIENT)
Dept: CT IMAGING | Age: 74
End: 2024-02-28
Payer: COMMERCIAL

## 2024-02-28 ENCOUNTER — HOSPITAL ENCOUNTER (OUTPATIENT)
Age: 74
Setting detail: OBSERVATION
Discharge: HOME | End: 2024-02-29
Attending: EMERGENCY MEDICINE | Admitting: EMERGENCY MEDICINE
Payer: COMMERCIAL

## 2024-02-28 ENCOUNTER — APPOINTMENT (OUTPATIENT)
Dept: GENERAL RADIOLOGY | Age: 74
End: 2024-02-28
Payer: COMMERCIAL

## 2024-02-28 DIAGNOSIS — G40.919 BREAKTHROUGH SEIZURE (HCC): Primary | ICD-10-CM

## 2024-02-28 LAB
ANION GAP SERPL CALCULATED.3IONS-SCNC: 22 MMOL/L (ref 9–17)
BASOPHILS # BLD: 0.06 K/UL (ref 0–0.2)
BASOPHILS NFR BLD: 1 % (ref 0–2)
BUN SERPL-MCNC: 18 MG/DL (ref 8–23)
CALCIUM SERPL-MCNC: 8.8 MG/DL (ref 8.6–10.4)
CHLORIDE SERPL-SCNC: 105 MMOL/L (ref 98–107)
CK SERPL-CCNC: 46 U/L (ref 39–308)
CO2 SERPL-SCNC: 10 MMOL/L (ref 20–31)
CREAT SERPL-MCNC: 1.8 MG/DL (ref 0.7–1.2)
EOSINOPHIL # BLD: 0.06 K/UL (ref 0–0.44)
EOSINOPHILS RELATIVE PERCENT: 1 % (ref 1–4)
ERYTHROCYTE [DISTWIDTH] IN BLOOD BY AUTOMATED COUNT: 13.9 % (ref 11.8–14.4)
GFR SERPL CREATININE-BSD FRML MDRD: 39 ML/MIN/1.73M2
GLUCOSE BLD-MCNC: 133 MG/DL (ref 75–110)
GLUCOSE SERPL-MCNC: 152 MG/DL (ref 70–99)
HCT VFR BLD AUTO: 42.5 % (ref 40.7–50.3)
HGB BLD-MCNC: 13 G/DL (ref 13–17)
IMM GRANULOCYTES # BLD AUTO: 0.03 K/UL (ref 0–0.3)
IMM GRANULOCYTES NFR BLD: 0 %
INR PPP: 1.1
LEVETIRACETAM SERPL-MCNC: 34 UG/ML
LYMPHOCYTES NFR BLD: 0.72 K/UL (ref 1.1–3.7)
LYMPHOCYTES RELATIVE PERCENT: 7 % (ref 24–43)
MCH RBC QN AUTO: 27.4 PG (ref 25.2–33.5)
MCHC RBC AUTO-ENTMCNC: 30.6 G/DL (ref 28.4–34.8)
MCV RBC AUTO: 89.7 FL (ref 82.6–102.9)
MONOCYTES NFR BLD: 0.99 K/UL (ref 0.1–1.2)
MONOCYTES NFR BLD: 10 % (ref 3–12)
MYOGLOBIN SERPL-MCNC: 81 NG/ML (ref 28–72)
NEUTROPHILS NFR BLD: 81 % (ref 36–65)
NEUTS SEG NFR BLD: 8.15 K/UL (ref 1.5–8.1)
NRBC BLD-RTO: 0 PER 100 WBC
PARTIAL THROMBOPLASTIN TIME: 28.4 SEC (ref 23–36.5)
PLATELET # BLD AUTO: 210 K/UL (ref 138–453)
PMV BLD AUTO: 10.7 FL (ref 8.1–13.5)
POTASSIUM SERPL-SCNC: 4.1 MMOL/L (ref 3.7–5.3)
PROTHROMBIN TIME: 14.1 SEC (ref 11.7–14.9)
RBC # BLD AUTO: 4.74 M/UL (ref 4.21–5.77)
SODIUM SERPL-SCNC: 137 MMOL/L (ref 135–144)
TROPONIN I SERPL HS-MCNC: 34 NG/L (ref 0–22)
WBC OTHER # BLD: 10 K/UL (ref 3.5–11.3)

## 2024-02-28 PROCEDURE — 2580000003 HC RX 258

## 2024-02-28 PROCEDURE — 6360000004 HC RX CONTRAST MEDICATION

## 2024-02-28 PROCEDURE — G0378 HOSPITAL OBSERVATION PER HR: HCPCS

## 2024-02-28 PROCEDURE — 6370000000 HC RX 637 (ALT 250 FOR IP): Performed by: STUDENT IN AN ORGANIZED HEALTH CARE EDUCATION/TRAINING PROGRAM

## 2024-02-28 PROCEDURE — 99448 NTRPROF PH1/NTRNET/EHR 21-30: CPT | Performed by: PSYCHIATRY & NEUROLOGY

## 2024-02-28 PROCEDURE — 6370000000 HC RX 637 (ALT 250 FOR IP)

## 2024-02-28 PROCEDURE — 70450 CT HEAD/BRAIN W/O DYE: CPT

## 2024-02-28 PROCEDURE — 99285 EMERGENCY DEPT VISIT HI MDM: CPT

## 2024-02-28 PROCEDURE — 96365 THER/PROPH/DIAG IV INF INIT: CPT

## 2024-02-28 PROCEDURE — 85730 THROMBOPLASTIN TIME PARTIAL: CPT

## 2024-02-28 PROCEDURE — 82330 ASSAY OF CALCIUM: CPT

## 2024-02-28 PROCEDURE — 6370000000 HC RX 637 (ALT 250 FOR IP): Performed by: EMERGENCY MEDICINE

## 2024-02-28 PROCEDURE — 85610 PROTHROMBIN TIME: CPT

## 2024-02-28 PROCEDURE — 84484 ASSAY OF TROPONIN QUANT: CPT

## 2024-02-28 PROCEDURE — 80048 BASIC METABOLIC PNL TOTAL CA: CPT

## 2024-02-28 PROCEDURE — 93005 ELECTROCARDIOGRAM TRACING: CPT | Performed by: EMERGENCY MEDICINE

## 2024-02-28 PROCEDURE — 82550 ASSAY OF CK (CPK): CPT

## 2024-02-28 PROCEDURE — 82803 BLOOD GASES ANY COMBINATION: CPT

## 2024-02-28 PROCEDURE — 6360000002 HC RX W HCPCS: Performed by: STUDENT IN AN ORGANIZED HEALTH CARE EDUCATION/TRAINING PROGRAM

## 2024-02-28 PROCEDURE — 71045 X-RAY EXAM CHEST 1 VIEW: CPT

## 2024-02-28 PROCEDURE — 83605 ASSAY OF LACTIC ACID: CPT

## 2024-02-28 PROCEDURE — 70498 CT ANGIOGRAPHY NECK: CPT

## 2024-02-28 PROCEDURE — 85014 HEMATOCRIT: CPT

## 2024-02-28 PROCEDURE — 85025 COMPLETE CBC W/AUTO DIFF WBC: CPT

## 2024-02-28 PROCEDURE — 82947 ASSAY GLUCOSE BLOOD QUANT: CPT

## 2024-02-28 PROCEDURE — 84520 ASSAY OF UREA NITROGEN: CPT

## 2024-02-28 PROCEDURE — 99222 1ST HOSP IP/OBS MODERATE 55: CPT | Performed by: PSYCHIATRY & NEUROLOGY

## 2024-02-28 PROCEDURE — 83874 ASSAY OF MYOGLOBIN: CPT

## 2024-02-28 PROCEDURE — 82553 CREATINE MB FRACTION: CPT

## 2024-02-28 PROCEDURE — 80177 DRUG SCRN QUAN LEVETIRACETAM: CPT

## 2024-02-28 PROCEDURE — 80051 ELECTROLYTE PANEL: CPT

## 2024-02-28 RX ORDER — LEVETIRACETAM 500 MG/1
750 TABLET ORAL DAILY
Status: DISCONTINUED | OUTPATIENT
Start: 2024-02-29 | End: 2024-02-29 | Stop reason: HOSPADM

## 2024-02-28 RX ORDER — PYRIDOSTIGMINE BROMIDE 60 MG/1
60 TABLET ORAL 4 TIMES DAILY
Status: DISCONTINUED | OUTPATIENT
Start: 2024-02-28 | End: 2024-02-29 | Stop reason: HOSPADM

## 2024-02-28 RX ORDER — SODIUM CHLORIDE 9 MG/ML
INJECTION, SOLUTION INTRAVENOUS PRN
Status: DISCONTINUED | OUTPATIENT
Start: 2024-02-28 | End: 2024-02-29 | Stop reason: HOSPADM

## 2024-02-28 RX ORDER — ONDANSETRON 2 MG/ML
4 INJECTION INTRAMUSCULAR; INTRAVENOUS EVERY 6 HOURS PRN
Status: DISCONTINUED | OUTPATIENT
Start: 2024-02-28 | End: 2024-02-29 | Stop reason: HOSPADM

## 2024-02-28 RX ORDER — ACETAMINOPHEN 325 MG/1
650 TABLET ORAL EVERY 6 HOURS PRN
Status: DISCONTINUED | OUTPATIENT
Start: 2024-02-28 | End: 2024-02-29 | Stop reason: HOSPADM

## 2024-02-28 RX ORDER — SODIUM CHLORIDE 9 MG/ML
INJECTION, SOLUTION INTRAVENOUS CONTINUOUS
Status: DISCONTINUED | OUTPATIENT
Start: 2024-02-28 | End: 2024-02-29 | Stop reason: HOSPADM

## 2024-02-28 RX ORDER — ONDANSETRON 4 MG/1
4 TABLET, ORALLY DISINTEGRATING ORAL EVERY 8 HOURS PRN
Status: DISCONTINUED | OUTPATIENT
Start: 2024-02-28 | End: 2024-02-29 | Stop reason: HOSPADM

## 2024-02-28 RX ORDER — LEVETIRACETAM 500 MG/1
750 TABLET ORAL 2 TIMES DAILY
Status: DISCONTINUED | OUTPATIENT
Start: 2024-02-28 | End: 2024-02-29

## 2024-02-28 RX ORDER — ACETAMINOPHEN 650 MG/1
650 SUPPOSITORY RECTAL EVERY 6 HOURS PRN
Status: DISCONTINUED | OUTPATIENT
Start: 2024-02-28 | End: 2024-02-29 | Stop reason: HOSPADM

## 2024-02-28 RX ORDER — SODIUM CHLORIDE 0.9 % (FLUSH) 0.9 %
5-40 SYRINGE (ML) INJECTION PRN
Status: DISCONTINUED | OUTPATIENT
Start: 2024-02-28 | End: 2024-02-29 | Stop reason: HOSPADM

## 2024-02-28 RX ORDER — ENOXAPARIN SODIUM 100 MG/ML
40 INJECTION SUBCUTANEOUS DAILY
Status: DISCONTINUED | OUTPATIENT
Start: 2024-02-28 | End: 2024-02-29 | Stop reason: HOSPADM

## 2024-02-28 RX ORDER — MAGNESIUM SULFATE IN WATER 40 MG/ML
2000 INJECTION, SOLUTION INTRAVENOUS PRN
Status: DISCONTINUED | OUTPATIENT
Start: 2024-02-28 | End: 2024-02-29 | Stop reason: HOSPADM

## 2024-02-28 RX ORDER — POLYETHYLENE GLYCOL 3350 17 G/17G
17 POWDER, FOR SOLUTION ORAL DAILY PRN
Status: DISCONTINUED | OUTPATIENT
Start: 2024-02-28 | End: 2024-02-29 | Stop reason: HOSPADM

## 2024-02-28 RX ORDER — METOPROLOL SUCCINATE 25 MG/1
25 TABLET, EXTENDED RELEASE ORAL 2 TIMES DAILY
Status: DISCONTINUED | OUTPATIENT
Start: 2024-02-28 | End: 2024-02-29 | Stop reason: HOSPADM

## 2024-02-28 RX ORDER — LEVETIRACETAM 500 MG/1
1000 TABLET ORAL NIGHTLY
Status: DISCONTINUED | OUTPATIENT
Start: 2024-02-28 | End: 2024-02-29 | Stop reason: HOSPADM

## 2024-02-28 RX ORDER — AMLODIPINE BESYLATE 5 MG/1
5 TABLET ORAL 2 TIMES DAILY
Status: DISCONTINUED | OUTPATIENT
Start: 2024-02-29 | End: 2024-02-29 | Stop reason: HOSPADM

## 2024-02-28 RX ORDER — POTASSIUM CHLORIDE 20 MEQ/1
40 TABLET, EXTENDED RELEASE ORAL PRN
Status: DISCONTINUED | OUTPATIENT
Start: 2024-02-28 | End: 2024-02-29 | Stop reason: HOSPADM

## 2024-02-28 RX ORDER — CLOPIDOGREL BISULFATE 75 MG/1
75 TABLET ORAL DAILY
Status: DISCONTINUED | OUTPATIENT
Start: 2024-02-28 | End: 2024-02-29 | Stop reason: HOSPADM

## 2024-02-28 RX ORDER — PYRIDOSTIGMINE BROMIDE 60 MG/1
60 TABLET ORAL ONCE
Status: COMPLETED | OUTPATIENT
Start: 2024-02-28 | End: 2024-02-28

## 2024-02-28 RX ORDER — LEVETIRACETAM 250 MG/1
250 TABLET ORAL NIGHTLY
Qty: 30 TABLET | Refills: 2 | Status: SHIPPED | OUTPATIENT
Start: 2024-02-28 | End: 2024-02-29 | Stop reason: HOSPADM

## 2024-02-28 RX ORDER — UBIDECARENONE 75 MG
50 CAPSULE ORAL DAILY
Status: DISCONTINUED | OUTPATIENT
Start: 2024-02-28 | End: 2024-02-29 | Stop reason: HOSPADM

## 2024-02-28 RX ORDER — LEVETIRACETAM 10 MG/ML
1000 INJECTION INTRAVASCULAR ONCE
Status: COMPLETED | OUTPATIENT
Start: 2024-02-28 | End: 2024-02-28

## 2024-02-28 RX ORDER — SODIUM CHLORIDE 0.9 % (FLUSH) 0.9 %
5-40 SYRINGE (ML) INJECTION EVERY 12 HOURS SCHEDULED
Status: DISCONTINUED | OUTPATIENT
Start: 2024-02-28 | End: 2024-02-29 | Stop reason: HOSPADM

## 2024-02-28 RX ORDER — LEVETIRACETAM 500 MG/1
250 TABLET ORAL NIGHTLY
Status: DISCONTINUED | OUTPATIENT
Start: 2024-02-28 | End: 2024-02-29

## 2024-02-28 RX ORDER — POTASSIUM CHLORIDE 7.45 MG/ML
10 INJECTION INTRAVENOUS PRN
Status: DISCONTINUED | OUTPATIENT
Start: 2024-02-28 | End: 2024-02-29 | Stop reason: HOSPADM

## 2024-02-28 RX ADMIN — LEVETIRACETAM 1000 MG: 10 INJECTION, SOLUTION INTRAVENOUS at 12:53

## 2024-02-28 RX ADMIN — METOPROLOL SUCCINATE 25 MG: 25 TABLET, EXTENDED RELEASE ORAL at 21:26

## 2024-02-28 RX ADMIN — IOPAMIDOL 90 ML: 755 INJECTION, SOLUTION INTRAVENOUS at 10:35

## 2024-02-28 RX ADMIN — CLOPIDOGREL BISULFATE 75 MG: 75 TABLET ORAL at 21:26

## 2024-02-28 RX ADMIN — LEVETIRACETAM 750 MG: 500 TABLET, FILM COATED ORAL at 21:28

## 2024-02-28 RX ADMIN — PYRIDOSTIGMINE BROMIDE 60 MG: 60 TABLET ORAL at 21:26

## 2024-02-28 RX ADMIN — SERTRALINE HYDROCHLORIDE 50 MG: 50 TABLET ORAL at 21:26

## 2024-02-28 RX ADMIN — Medication 50 MCG: at 21:26

## 2024-02-28 RX ADMIN — SODIUM CHLORIDE: 9 INJECTION, SOLUTION INTRAVENOUS at 21:38

## 2024-02-28 RX ADMIN — LEVETIRACETAM 1000 MG: 500 TABLET, FILM COATED ORAL at 21:26

## 2024-02-28 RX ADMIN — PYRIDOSTIGMINE BROMIDE 60 MG: 60 TABLET ORAL at 13:44

## 2024-02-28 RX ADMIN — LEVETIRACETAM 250 MG: 500 TABLET, FILM COATED ORAL at 21:28

## 2024-02-28 NOTE — ED TRIAGE NOTES
Pt arrives to ED via LS11 form home for seizure like activity, concern for CVA. Pt has hx of CVA, per report had a RIGHT gaze at home and had seizure like activity just prior to the gaze, unsure if it is CVA or Seizure.  Pt arrives speaking incomprehensibly, confused, on 2 L O2 via NC.     Pt eyes opened spontaneously, some confusion, unable to answer questions appropriately, placed on full monitor, EKG done, IV established, changed into gown and given warm blankets. CT contacted. Labs drawn, labeled, and sent to lab. Pt vitals  show HTN but otherwise WNL. EPOC Ran, glucometer ran for BG check. White board updated, and patient is updated on plan of care. Will continue to monitor. Bed rails padded for seizure precautions.

## 2024-02-28 NOTE — CARE COORDINATION
DISCHARGE PLANNING EVALUATION: OP/OBSERVATION        2/28/24, 6:19 PM AMADO Bhatia         Location: 10/10   Reason for hospitalization: Breakthrough seizure (HCC) [G40.919]     CM Services requested for transitional needs.     PCP: Otoniel Zuñiga MD    Transportation/Food Security/Housekeeping Addressed:  No issues identified.     Equipment needs: none    Transition plan: plan is home, has DME cane, daughter to provide transportation

## 2024-02-28 NOTE — ED PROVIDER NOTES
[facility]  FACULTY HANDOFF       Handoff taken on the following patient from prior Attending Physician:  Pt Name: Blake GEOFF Sriram  PCP:  Otoniel Zuñiga MD    Attestation  I was available and discussed any additional care issues that arose and coordinated the management plans with the resident(s) caring for the patient during my duty period. Any areas of disagreement with resident's documentation of care or procedures are noted on the chart. I was personally present for the key portions of any/all procedures during my duty period. I have documented in the chart those procedures where I was not present during the key portions.           Nicolas Taylor MD  02/28/24 [id number]

## 2024-02-28 NOTE — ED NOTES
ED to inpatient nurses report      Chief Complaint:  Chief Complaint   Patient presents with    Seizures    Loss of Consciousness     Present to ED from: Home    MOA:     LOC: Alert  Mobility: Ambulatory with assist  Oxygen Baseline: Pt states NC, unable to tell writer how much    Current needs required: 3 L NC  Pending ED orders: N/A  Present condition: Resting in bed comfortably, NAD noted    Why did the patient come to the ED?   Seizure vs CVA at home  What is the plan?   Monitor, see neuro   Any procedures or intervention occur?   CVA work up, labs, CT  Any safety concerns??  Fall risk    Mental Status:  Level of Consciousness: Alert (0)    Psych Assessment:   Psychosocial  Psychosocial (WDL): Within Defined Limits  Vital signs   Vitals:    02/28/24 1548 02/28/24 1632 02/28/24 1642 02/28/24 1715   BP:  (!) 142/69  (!) 141/68   Pulse: 88  89 90   Resp:       Temp:       TempSrc:       SpO2: 98%  98% 97%   Weight:            Vitals:  Patient Vitals for the past 24 hrs:   BP Temp Temp src Pulse Resp SpO2 Weight   02/28/24 1715 (!) 141/68 -- -- 90 -- 97 % --   02/28/24 1642 -- -- -- 89 -- 98 % --   02/28/24 1632 (!) 142/69 -- -- -- -- -- --   02/28/24 1548 -- -- -- 88 -- 98 % --   02/28/24 1545 138/69 -- -- -- -- -- --   02/28/24 1530 (!) 147/64 -- -- 97 -- 98 % --   02/28/24 1528 -- -- -- 82 -- 97 % --   02/28/24 1500 (!) 161/71 -- -- -- -- 97 % --   02/28/24 1459 -- -- -- 84 -- -- --   02/28/24 1456 -- -- -- 98 -- 98 % --   02/28/24 1445 (!) 155/70 -- -- 96 -- 99 % --   02/28/24 1430 (!) 144/68 -- -- 94 -- 98 % --   02/28/24 1429 -- -- -- -- -- 99 % --   02/28/24 1428 -- -- -- -- -- 98 % --   02/28/24 1427 -- -- -- -- -- 100 % --   02/28/24 1426 -- -- -- -- -- 100 % --   02/28/24 1425 -- -- -- -- -- 100 % --   02/28/24 1424 -- -- -- -- -- 96 % --   02/28/24 1423 (!) 151/88 -- -- -- -- -- --   02/28/24 1415 (!) 152/66 -- -- 95 -- 98 % --   02/28/24 1400 (!) 155/79 -- -- 94 -- 100 % --   02/28/24 1358 -- -- -- 95 --

## 2024-02-28 NOTE — ED PROVIDER NOTES
[facility] ED     Emergency Department     Faculty Attestation    I performed a history and physical examination of the patient and discussed management with the resident. I reviewed the resident’s note and agree with the documented findings and plan of care. Any areas of disagreement are noted on the chart. I was personally present for the key portions of any procedures. I have documented in the chart those procedures where I was not present during the key portions. I have reviewed the emergency nurses triage note. I agree with the chief complaint, past medical history, past surgical history, allergies, medications, social and family history as documented unless otherwise noted below. For Physician Assistant/ Nurse Practitioner cases/documentation I have personally evaluated this patient and have completed at least one if not all key elements of the E/M (history, physical exam, and MDM). Additional findings are as noted.    Note Started: 10:12 AM EST    Called to bedside patient arrived by EMS who provides entire history independently for possible stroke possible seizures.  Patient does have a known seizure disorder.  Was unresponsive normal blood sugar no response to Narcan despite pinpoint pupils no opioids on scene or patient's med list per EMS.  On arrival patient is moaning moving spontaneously but will not follow commands.  Pupils are not pinpoint.  Dry mucous membranes but is protecting airway.  Stroke team at bedside will take to CT emergently, see stroke notes for full neurologic exam and NIH stroke scale, monitor need for antiepileptics full workup anticipate admission    EKG interpretation: Sinus rhythm 101.  Wide QRS at 132 prolonged QTc at 513 there is a complete right bundle branch block.  No acute ST or T changes given block similar to 10/13/2023    Critical Care     CRITICAL CARE: There was a high probability of clinically significant/life threatening deterioration in this

## 2024-02-28 NOTE — ED PROVIDER NOTES
[facility] ED  Emergency Department Encounter  Emergency Medicine Resident     Pt Name:Blake Bhatia  MRN: [id number]  Birthdate 1950  Date of evaluation: 2/28/24  PCP:  Otoniel Zuñiga MD  Note Started: 10:13 AM EST      CHIEF COMPLAINT       Chief Complaint   Patient presents with    Seizures    Loss of Consciousness       HISTORY OF PRESENT ILLNESS  (Location/Symptom, Timing/Onset, Context/Setting, Quality, Duration, Modifying Factors, Severity.)      Blake Bhatia is a 73 y.o. male with history of hypertension, CKD, seizure disorder on Keppra, myasthenia gravis who presents with altered mental status that began today.  Concern for possible seizure-like activity versus stroke..  Patient altered upon arrival of EMS.  Stroke alert was called due to patient's rightward gaze and global neurologic deficits.  Glucose was within normal.  Patient was given Narcan on scene due to pinpoint pupils.  No response per EMS.    Upon arrival, patient is moving spontaneously and moaning, intermittently following commands.  Stroke team at bedside      PAST MEDICAL / SURGICAL / SOCIAL / FAMILY HISTORY      has a past medical history of Arthritis, Chronic kidney disease, Dysmetabolic syndrome, Hypertension, Iron deficiency anemia, unspecified, Movement disorder, Myasthenia gravis (HCC), Proteinuria, and Seizures (HCC).       has a past surgical history that includes Quadraceps tendon repair; knee surgery; Cholecystectomy (12/10/2022); and Cholecystectomy, laparoscopic (N/A, 12/10/2022).      Social History     Socioeconomic History    Marital status:      Spouse name: Not on file    Number of children: Not on file    Years of education: Not on file    Highest education level: Not on file   Occupational History    Not on file   Tobacco Use    Smoking status: Former     Current packs/day: 1.00     Types: Cigarettes    Smokeless tobacco: Never   Substance and Sexual Activity    Alcohol use: No    Drug

## 2024-02-28 NOTE — CONSULTS
10:14 AM    GLUCOSE 107 02/28/2022 03:42 PM     No results found for: \"LABA1C\"  No results found for: \"LDLCALC\", \"LDLCHOLESTEROL\", \"LDLDIRECT\"    Radiology Review:    XR CHEST PORTABLE   Final Result   1. No radiographic evidence of acute cardiopulmonary pathology.   2. Chronic fracture and impaction of the right humeral head and neck.         CT HEAD WO CONTRAST   Final Result   1. Stable appearance of the brain without acute intracranial process   identified.   2. Mild narrowing of the P2 segment of the left posterior cerebral artery,   otherwise no large vessel occlusion, significant stenosis or cerebral   aneurysm identified within the brain.   3. Mild atherosclerosis without significant arterial stenosis identified   within the neck.         CTA HEAD NECK W CONTRAST   Final Result   1. Stable appearance of the brain without acute intracranial process   identified.   2. Mild narrowing of the P2 segment of the left posterior cerebral artery,   otherwise no large vessel occlusion, significant stenosis or cerebral   aneurysm identified within the brain.   3. Mild atherosclerosis without significant arterial stenosis identified   within the neck.             Assessment:       Blake Bhatia is a 73 y.o. male with a history of myasthenia, seizure disorder, who presents after an acute episode of altered mentation.  Acute onset of symptoms, noticed by his brother.  No clear seizure-like activity noted.  Denies noncompliance with seizure medication.  Persistently confused upon presentation.  NIH 5.  CT and CTA unremarkable.  Metabolic workup and Keppra level pending.    Altered mentation, possible breakthrough seizures      Last Known Well (date and time)     9 AM on 2/28/2024     Candidate for IV Tenecteplase therapy    Yes []  Risks including 6% of sich/death, benefits of potential improved thrombolysis, and alternatives to IV thrombolytics discussed with patient and/or family.    No   [x] due to the following  exclusion criteria:  Low concern for stroke   Candidate for Thrombectomy   Yes []    No  [x] due to the following exclusion criteria: No LVO on Imaging        Plan:       Imaging  - CTH WO: Completed, as above  - CTA H&N: Completed, as above  - Recent MRI brain from October 2023 reviewed  - EKG    Medications  - Resume home medications  - Keppra dose increased to 750/1000.  - Folic acid 1mg BID  - Lipitor 80mg nightly     Labs  - Fasting Lipid Panel - less than 70  - If no recent a1c, order a1c.  A1c DM Goal: <7.0%  - Keppra level pending    Orders  - PT, OT, Speech eval, PMR c/s  - Telemetry   - Neuro checks per protocol  - We recommend SBP normotensive  - Blood glucose goal less than 180  - Please avoid dextrose containing solutions       Felipa Ryder MD  Neurology Resident PGY-4  2/28/2024  4:50 PM

## 2024-02-28 NOTE — CONSULTS
Department of Neurology/Telestroke/Stroke  Resident Consult Note  Stroke Alert @9:58 AM  Arrival at bedside @10:03 AM    Reason for Consult:  ED Stroke Alert  Requesting Physician: Dr. Garcia  Endovascular Neurosurgeon:   Francesco Mann MD    Stroke Team:  Bo Rubalcava MD      History Obtained From:  family member -daughter, electronic medical record    CHIEF COMPLAINT:       Altered mentation    HISTORY OF PRESENT ILLNESS:       The patient is a 73 y.o. male with a PMH of arthritis, CKD, HTN, myasthenia gravis, seizure disorder, who presents with acute onset altered mentation.  Patient lives with his brother and was apparently doing well when he woke up this morning.  Patient's brother subsequently left for an errand around 5:30 AM and came back around 8:30 AM.  Upon his return he noticed the patient to be doing well and at his baseline, subsequently a little bit later patient sat on his bed and was noted to be staring off and confused and not responding to his brothers voice.  Subsequently EMS was called and patient was brought in for evaluation.  Upon arrival patient had an initial NIH of 5.  He continued to be confused.  He was taken for stat CT head and CTA head and neck which were unremarkable for any acute abnormality.    Further history was obtained from patient's daughter who stated that the patient had been diagnosed with myasthenia gravis over the last year and has been on Mestinon.  Mestinon dose was decreased back in December.  Patient follows with [facility] neurology, Dr. Mccartney.  Patient also has a history of seizure disorder and has been on Keppra for it.  Daughter states she is not aware of any noncompliance with medications.  States his last seizure was back in October of last year.      Per chart review patient was seen at [facility] after a breakthrough seizure in October.  Seizure semiology is generalized tonic-clonic.  Patient's seizures had been very well-controlled and had only

## 2024-02-28 NOTE — ED NOTES
Report received from Marylu DÍAZ, all questions answered. Pt remains on full cardiac monitor. Care ongoing

## 2024-02-28 NOTE — ED NOTES
The following labs were labeled with appropriate pt sticker and tubed to lab:     [x] Blue     [x] Lavender   [] on ice  [x] Green/yellow  [x] Green/black [] on ice  [] De La Cruz  [] on ice  [x] Yellow  [x] Red  [] Pink  [] Type/ Screen  [] ABG  [] VBG    [] COVID-19 swab    [] Rapid  [] PCR  [] Flu swab  [] Peds Viral Panel     [] Urine Sample  [] Fecal Sample  [] Pelvic Cultures  [] Blood Cultures  [] X 2  [] STREP Cultures  [] Wound Cultures

## 2024-02-28 NOTE — PROGRESS NOTES
[facility]     Emergency/Trauma Note    PATIENT NAME: Blake Bhatia    Shift date: 02/28/2024  Shift day: Wednesday   Shift # 1    Room # 10/10     Name: Blake Bhatia            Age: 73 y.o.  Gender: male          Anglican: Non-Sabianism   Place of Temple:     Trauma/Incident type: Stroke Alert  Admit Date & Time: 2/28/2024 10:07 AM  TRAUMA NAME: None    ADVANCE DIRECTIVES IN CHART?  No    NAME OF DECISION MAKER:     RELATIONSHIP OF DECISION MAKER TO PATIENT:     PATIENT/EVENT DESCRIPTION:  Blake Bhatia is a 73 y.o. male who arrived via ground ambulance as stroke alert. Patient was unresponsive. Patient to be admitted to 10/10.       SPIRITUAL ASSESSMENT-INTERVENTION-OUTCOME:  No spiritual assessment was carried out because patient was unresponsive. Patient's daughter, Misty, arrived some minutes later.  provided ministry of presence, offered support, prayed with Misty and reassured her that patient was in good hands. Misty expressed appreciation for the spiritual and emotional support she received.      PATIENT BELONGINGS:  No belongings noted    ANY BELONGINGS OF SIGNIFICANT VALUE NOTED:  Unknown    REGISTRATION STAFF NOTIFIED?  Yes    WHAT IS YOUR SPIRITUAL CARE PLAN FOR THIS PATIENT?:  Follow up visits recommended for ongoing assessment of patient's condition and for more prayers and support.     Electronically signed by Chaplain Kaila, on 2/28/2024 at 1:25 PM.  [facility]  [phone]

## 2024-02-28 NOTE — ED NOTES
Pt requested urinal  Pt incontinent of urine  Pt changed/depends placed; positioned for comfort  Pt voiced additional concerns   1.2

## 2024-02-29 VITALS
HEIGHT: 70 IN | RESPIRATION RATE: 18 BRPM | TEMPERATURE: 98.6 F | OXYGEN SATURATION: 98 % | SYSTOLIC BLOOD PRESSURE: 134 MMHG | DIASTOLIC BLOOD PRESSURE: 60 MMHG | HEART RATE: 80 BPM | WEIGHT: 152.12 LBS | BODY MASS INDEX: 21.78 KG/M2

## 2024-02-29 LAB
BUN BLD-MCNC: 15 MG/DL (ref 8–26)
CA-I BLD-SCNC: 1.12 MMOL/L (ref 1.15–1.33)
CHLORIDE BLD-SCNC: 113 MMOL/L (ref 98–107)
CO2 BLD CALC-SCNC: 12 MMOL/L (ref 22–30)
EGFR, POC: NORMAL ML/MIN/1.73M2
GLUCOSE BLD-MCNC: 121 MG/DL (ref 74–100)
HCO3 VENOUS: 12.2 MMOL/L (ref 22–29)
HCT VFR BLD AUTO: 38 % (ref 41–53)
NEGATIVE BASE EXCESS, VEN: 11.7 MMOL/L (ref 0–2)
O2 SAT, VEN: 67.1 % (ref 60–85)
PCO2, VEN: 22.7 MM HG (ref 41–51)
PH VENOUS: 7.34 (ref 7.32–7.43)
PO2, VEN: 36 MM HG (ref 30–50)
POC ANION GAP: 19 MMOL/L (ref 7–16)
POC CREATININE: NORMAL MG/DL (ref 0.51–1.19)
POC HEMOGLOBIN (CALC): 12.8 G/DL (ref 13.5–17.5)
POC LACTIC ACID: 7.5 MMOL/L (ref 0.56–1.39)
POTASSIUM BLD-SCNC: 3.6 MMOL/L (ref 3.5–4.5)
SODIUM BLD-SCNC: 143 MMOL/L (ref 138–146)

## 2024-02-29 PROCEDURE — 6370000000 HC RX 637 (ALT 250 FOR IP)

## 2024-02-29 PROCEDURE — APPSS30 APP SPLIT SHARED TIME 16-30 MINUTES: Performed by: NURSE PRACTITIONER

## 2024-02-29 PROCEDURE — G0378 HOSPITAL OBSERVATION PER HR: HCPCS

## 2024-02-29 PROCEDURE — 97166 OT EVAL MOD COMPLEX 45 MIN: CPT

## 2024-02-29 PROCEDURE — 97535 SELF CARE MNGMENT TRAINING: CPT

## 2024-02-29 PROCEDURE — 2580000003 HC RX 258

## 2024-02-29 RX ORDER — LEVETIRACETAM 750 MG/1
750 TABLET ORAL EVERY MORNING
Qty: 60 TABLET | Refills: 3 | Status: SHIPPED | OUTPATIENT
Start: 2024-02-29

## 2024-02-29 RX ORDER — LEVETIRACETAM 1000 MG/1
1000 TABLET ORAL NIGHTLY
Qty: 60 TABLET | Refills: 3 | Status: SHIPPED | OUTPATIENT
Start: 2024-02-29

## 2024-02-29 RX ADMIN — Medication 50 MCG: at 08:22

## 2024-02-29 RX ADMIN — PYRIDOSTIGMINE BROMIDE 60 MG: 60 TABLET ORAL at 14:38

## 2024-02-29 RX ADMIN — SODIUM CHLORIDE, PRESERVATIVE FREE 10 ML: 5 INJECTION INTRAVENOUS at 08:23

## 2024-02-29 RX ADMIN — LEVETIRACETAM 750 MG: 500 TABLET, FILM COATED ORAL at 08:22

## 2024-02-29 RX ADMIN — CLOPIDOGREL BISULFATE 75 MG: 75 TABLET ORAL at 08:23

## 2024-02-29 RX ADMIN — PYRIDOSTIGMINE BROMIDE 60 MG: 60 TABLET ORAL at 08:22

## 2024-02-29 RX ADMIN — SERTRALINE HYDROCHLORIDE 50 MG: 50 TABLET ORAL at 08:22

## 2024-02-29 RX ADMIN — METOPROLOL SUCCINATE 25 MG: 25 TABLET, EXTENDED RELEASE ORAL at 08:23

## 2024-02-29 RX ADMIN — AMLODIPINE BESYLATE 5 MG: 5 TABLET ORAL at 08:22

## 2024-02-29 NOTE — H&P
[facility]  CDU / OBSERVATION ENCOUNTER  ATTENDING NOTE     Pt Name: Blake Bhatia  MRN: [id number]  Birthdate 1950  Date of evaluation: 2/29/24  Patient's PCP is :  Otoniel Zuñiga MD    CHIEF COMPLAINT       Chief Complaint   Patient presents with    Seizures    Loss of Consciousness         HISTORY OF PRESENT ILLNESS    Blake Bhatia is a 73 y.o. male who presents with altered mental status that began yesterday.  Concern for possible seizure-like activity versus stroke.  Stroke alert was called in the ED.  Patient had been given Narcan on scene by EMS due to pinpoint pupils with no improvement.  Patient had improvement of his altered mental status in the ED.  Patient had difficult time ambulating.    This morning, patient is somewhat confused.  Does not know who he lives with, states that I should ask his daughter.  Did not know what happened yesterday.    History was obtained in part through review of the ED chart. When possible, a direct discussion was had with ED nurses, residents, and attendings  REVIEW OF SYSTEMS       General ROS - No fevers, No malaise   Ophthalmic ROS - No discharge, No changes in vision  ENT ROS -  No sore throat, No rhinorrhea,   Respiratory ROS - no shortness of breath, no cough, no  wheezing  Cardiovascular ROS - No chest pain, no dyspnea on exertion  Gastrointestinal ROS - No abdominal pain, no nausea or vomiting, no change in bowel habits, no black or bloody stools  Genito-Urinary ROS - No dysuria, trouble voiding, or hematuria  Musculoskeletal ROS - No myalgias, No arthalgias  Neurological ROS - No headache, no dizziness/lightheadedness, No focal weakness, no loss of sensation, positive confusion  Dermatological ROS - No lesions, No rash     (PQRS) Advance directives on face sheet per hospital policy. No change unless specifically mentioned in chart    PAST MEDICAL HISTORY    has a past medical history of Arthritis, Chronic kidney disease, Dysmetabolic

## 2024-02-29 NOTE — PROGRESS NOTES
Occupational Therapy  Facility/Department: [facility] OBSERVATION UNIT  Occupational Therapy Initial Assessment    Name: Blake Bhatia  : 1950  MRN: [id number]  Date of Service: 2024    Chief Complaint   Patient presents with    Seizures    Loss of Consciousness       Discharge Recommendations:  Patient would benefit from continued therapy after discharge  OT Equipment Recommendations  Equipment Needed: No       Patient Diagnosis(es): The encounter diagnosis was Breakthrough seizure (HCC).  Past Medical History:  has a past medical history of Arthritis, Chronic kidney disease, Dysmetabolic syndrome, Hypertension, Iron deficiency anemia, unspecified, Movement disorder, Myasthenia gravis (HCC), Proteinuria, and Seizures (HCC).  Past Surgical History:  has a past surgical history that includes Quadraceps tendon repair; knee surgery; Cholecystectomy (12/10/2022); and Cholecystectomy, laparoscopic (N/A, 12/10/2022).           Assessment   Performance deficits / Impairments: Decreased safe awareness;Decreased ADL status  Assessment: Pt would benefit from continued therpay to increase IND in ADL tasks and ensure safe discharge from acute setting.  Prognosis: Good  Decision Making: Medium Complexity  REQUIRES OT FOLLOW-UP: Yes  Activity Tolerance  Activity Tolerance: Patient Tolerated treatment well        Plan   Occupational Therapy Plan  Times Per Week: 2x/week  Current Treatment Recommendations: Safety education & training, Home management training, Self-Care / ADL, Patient/Caregiver education & training     Restrictions  Restrictions/Precautions  Required Braces or Orthoses?: No  Position Activity Restriction  Other position/activity restrictions: up with assist, ambulate    Subjective   General  Patient assessed for rehabilitation services?: Yes  Family / Caregiver Present: Yes (daughter)  General Comment  Comments: RN okayed for therapy. Pt agreeable and cooperative throughout, Standing IND at bedside upon arrival

## 2024-02-29 NOTE — PROGRESS NOTES
NEUROLOGY INPATIENT PROGRESS NOTE    2/29/2024         Current Exam:     Chart reviewed. Discussed with RN. Patient is doing well today, daughter at the bedside reports his mentation has improved. He denies any acute complaints. There have been no seizures.         Brief History:    Blake Bhatia is a  73 y.o. male with H/O arthritis, DM, CKD, myasthenia gravis, seizure disorder, who was admitted on 2/28/2024 with AMS.  Patient was found by family to be staring off and confused.  On arrival to [facility] he was evaluated by stroke team; CT head and CTA head and neck with no acute findings.  Not felt to be candidate for TNK.  At home patient takes Keppra 750 mg twice daily for seizure disorder and due to concerns of possible breakthrough seizure he was given a loading dose of IV Keppra 2 g and maintenance dose was increased to 750+1000 mg/day.  Levetiracetam level 34 on arrival.  Creatinine also elevated 1.8.    Patient was diagnosed with myasthenia gravis over 10 years ago and has been taking Mestinon.  Mestinon dose was just decreased in December.  He follows with [facility] neurology, Dr. Mccartney.        No current facility-administered medications on file prior to encounter.     Current Outpatient Medications on File Prior to Encounter   Medication Sig Dispense Refill    amLODIPine (NORVASC) 5 MG tablet Take 1 tablet by mouth in the morning and 1 tablet in the evening. 60 tablet     levETIRAcetam (KEPPRA) 750 MG tablet Take 1 tablet by mouth 2 times daily 60 tablet 1    metoprolol succinate (TOPROL XL) 25 MG extended release tablet Take 1 tablet by mouth in the morning and at bedtime 60 tablet 1    vitamin B-12 (CYANOCOBALAMIN) 100 MCG tablet Take 0.5 tablets by mouth daily      sertraline (ZOLOFT) 50 MG tablet Take 1 tablet by mouth daily      clopidogrel (PLAVIX) 75 MG tablet Take 1 tablet by mouth daily      pyridostigmine (MESTINON) 60 MG tablet Take 1 tablet by mouth 5 times daily (Patient taking differently: Take  +1000 mg/day  -Continue home dose Mestinon 60 mg 4 times daily; this was recently decreased by his outpatient neurologist in December 2023 and patient reports his symptoms have remained stable  -Continue therapies  -Will need outpatient follow-up with his [facility] neurologist. We will sign off. Please call with any questions.    Please note that this note was generated using a voice recognition dictation software. Although every effort was made to ensure the accuracy of this automated transcription, some errors in transcription may have occurred.

## 2024-02-29 NOTE — PROGRESS NOTES
[facility]  CDU / OBSERVATION eNCOUnter  Attending NOte       I performed a history and physical examination of the patient and discussed management with the resident.  This patient was placed in the observation unit for reevaluation for possible admission to the hospital. I reviewed the resident’s note and agree with the documented findings and plan of care. Any areas of disagreement are noted on the chart. I was personally present for the key portions of any procedures. I have documented in the chart those procedures where I was not present during the key portions. I have reviewed the nurses notes. I agree with the chief complaint, past medical history, past surgical history, allergies, medications, social and family history as documented unless otherwise noted below.    The patient was placed in the observation unit for reevaluation for possible admission to the hospital.      The Family history, social history, and ROS are effectively unchanged since admission unless noted elsewhere in the chart.    Feeling well this AM. No seizures overnight. Patient confused and staring into space which is was brought him to the ED. CT/CTA stroke eval done. Patient loaded with Keppra. This AM, he is AO x 3. Daughter at bedside. Plan for PT and OT eval. Discharge planning.     Elke Javier MD  Attending Emergency  Physician

## 2024-03-01 LAB
EKG ATRIAL RATE: 101 BPM
EKG P AXIS: 66 DEGREES
EKG P-R INTERVAL: 164 MS
EKG Q-T INTERVAL: 396 MS
EKG QRS DURATION: 132 MS
EKG QTC CALCULATION (BAZETT): 513 MS
EKG R AXIS: -43 DEGREES
EKG T AXIS: 49 DEGREES
EKG VENTRICULAR RATE: 101 BPM

## 2024-03-01 NOTE — DISCHARGE SUMMARY
reasonably achievable.; CTA of the head and neck was performed with the administration of intravenous contrast. Multiplanar reformatted images are provided for review.  MIP images are provided for review. Stenosis of the internal carotid arteries measured using NASCET criteria. Automated exposure control, iterative reconstruction, and/or weight based adjustment of the mA/kV was utilized to reduce the radiation dose to as low as reasonably achievable. Noncontrast CT of the head with reconstructed 2-D images are also provided for review. This scan was analyzed using [employer] contact LVO. Identification of suspected findings is not for diagnostic use beyond notification. Viz LVO is limited to analysis of imaging data and should not be used in-lieu of full patient evaluation or relied upon to make or confirm diagnosis. COMPARISON: CT brain 10/13/2023 and MRI brain 10/14/2023 HISTORY: ORDERING SYSTEM PROVIDED HISTORY: AMS, seizure like activity TECHNOLOGIST PROVIDED HISTORY: AMS, seizure like activity Decision Support Exception - unselect if not a suspected or confirmed emergency medical condition->Emergency Medical Condition (MA) Reason for Exam: seizure like activity, loss of consciousness FINDINGS: CT HEAD: BRAIN/VENTRICLES:  There is no acute infarct or acute intracranial hemorrhage present.  There is no mass effect or midline shift present.  There is no ventriculomegaly or abnormal extra-axial fluid collection present. Hypoattenuation within the periventricular and deep white matter is unchanged. ORBITS: Limited evaluation of the orbits is unremarkable. SINUSES:  The paranasal sinuses and mastoid air cells are clear. SOFT TISSUES/SKULL: No lytic or blastic osseous lesions are identified. CTA NECK: AORTIC ARCH/ARCH VESSELS: Atherosclerotic calcifications are present within the aortic arch.  No significant stenosis of the innominate or subclavian arteries identified. CAROTID ARTERIES: The common carotid arteries are

## 2024-03-04 PROCEDURE — 93010 ELECTROCARDIOGRAM REPORT: CPT | Performed by: INTERNAL MEDICINE

## 2024-06-03 RX ORDER — LEVETIRACETAM 750 MG/1
750 TABLET ORAL EVERY MORNING
Qty: 60 TABLET | Refills: 3 | OUTPATIENT
Start: 2024-06-03

## (undated) DEVICE — PROTECTOR ULN NRV PUR FOAM HK LOOP STRP ANATOMICALLY

## (undated) DEVICE — DRAIN SURG 19FR 100% SIL RADPQ RND CHN FULL FLUT

## (undated) DEVICE — SUTURE SZ 0 27IN 5/8 CIR UR-6  TAPER PT VIOLET ABSRB VICRYL J603H

## (undated) DEVICE — ADHESIVE SKIN CLOSURE TOP 36 CC HI VISC DERMBND MINI

## (undated) DEVICE — ARM DRAPE

## (undated) DEVICE — BLADELESS OBTURATOR: Brand: WECK VISTA

## (undated) DEVICE — GLOVE SURG SZ 8 L11.77IN FNGR THK9.8MIL STRW LTX POLYMER

## (undated) DEVICE — Device

## (undated) DEVICE — DRESSING TRNSPAR W2XL2.75IN FLM SHT SEMIPERMEABLE WIND

## (undated) DEVICE — INSUFFLATION NEEDLE TO ESTABLISH PNEUMOPERITONEUM.: Brand: INSUFFLATION NEEDLE

## (undated) DEVICE — CANNULA SEAL

## (undated) DEVICE — INSUFFLATION TUBING SET WITH FILTER, FUNNEL CONNECTOR AND LUER LOCK: Brand: JOSNOE MEDICAL INC

## (undated) DEVICE — DEVICE TRCR 12X9X3IN WHT CLSR DISP OMNICLOSE

## (undated) DEVICE — PAD,NON-ADHERENT,3X8,STERILE,LF,1/PK: Brand: MEDLINE

## (undated) DEVICE — TROCAR: Brand: KII FIOS FIRST ENTRY

## (undated) DEVICE — TISSUE RETRIEVAL SYSTEM: Brand: INZII RETRIEVAL SYSTEM

## (undated) DEVICE — APPLICATOR MEDICATED 26 CC SOLUTION HI LT ORNG CHLORAPREP

## (undated) DEVICE — TOWEL,OR,DSP,ST,NATURAL,DLX,4/PK,20PK/CS: Brand: MEDLINE

## (undated) DEVICE — RESERVOIR,SUCTION,100CC,SILICONE: Brand: MEDLINE

## (undated) DEVICE — BLADE CLIPPER GEN PURP NS

## (undated) DEVICE — SOLUTION ANTIFOG VIS SYS CLEARIFY LAPSCP

## (undated) DEVICE — STAPLER 60 RELOAD GREEN: Brand: SUREFORM

## (undated) DEVICE — GLOVE ORANGE PI 7 1/2   MSG9075